# Patient Record
Sex: MALE | Race: WHITE | NOT HISPANIC OR LATINO | URBAN - METROPOLITAN AREA
[De-identification: names, ages, dates, MRNs, and addresses within clinical notes are randomized per-mention and may not be internally consistent; named-entity substitution may affect disease eponyms.]

---

## 2017-01-09 ENCOUNTER — OUTPATIENT (OUTPATIENT)
Dept: OUTPATIENT SERVICES | Facility: HOSPITAL | Age: 82
LOS: 1 days | End: 2017-01-09
Payer: MEDICARE

## 2017-01-09 VITALS
SYSTOLIC BLOOD PRESSURE: 130 MMHG | DIASTOLIC BLOOD PRESSURE: 82 MMHG | WEIGHT: 253.53 LBS | HEIGHT: 72 IN | RESPIRATION RATE: 16 BRPM | TEMPERATURE: 97 F | HEART RATE: 58 BPM

## 2017-01-09 DIAGNOSIS — Z01.818 ENCOUNTER FOR OTHER PREPROCEDURAL EXAMINATION: ICD-10-CM

## 2017-01-09 DIAGNOSIS — Z98.89 OTHER SPECIFIED POSTPROCEDURAL STATES: Chronic | ICD-10-CM

## 2017-01-09 DIAGNOSIS — Z95.1 PRESENCE OF AORTOCORONARY BYPASS GRAFT: Chronic | ICD-10-CM

## 2017-01-09 DIAGNOSIS — Z98.890 OTHER SPECIFIED POSTPROCEDURAL STATES: Chronic | ICD-10-CM

## 2017-01-09 DIAGNOSIS — K40.90 UNILATERAL INGUINAL HERNIA, WITHOUT OBSTRUCTION OR GANGRENE, NOT SPECIFIED AS RECURRENT: ICD-10-CM

## 2017-01-09 DIAGNOSIS — Z95.810 PRESENCE OF AUTOMATIC (IMPLANTABLE) CARDIAC DEFIBRILLATOR: Chronic | ICD-10-CM

## 2017-01-09 LAB
ALBUMIN SERPL ELPH-MCNC: 4.2 G/DL — SIGNIFICANT CHANGE UP (ref 3.3–5.2)
ALP SERPL-CCNC: 58 U/L — SIGNIFICANT CHANGE UP (ref 40–120)
ALT FLD-CCNC: 25 U/L — SIGNIFICANT CHANGE UP
ANION GAP SERPL CALC-SCNC: 11 MMOL/L — SIGNIFICANT CHANGE UP (ref 5–17)
APTT BLD: 38.7 SEC — HIGH (ref 27.5–37.4)
AST SERPL-CCNC: 21 U/L — SIGNIFICANT CHANGE UP
BILIRUB SERPL-MCNC: 0.8 MG/DL — SIGNIFICANT CHANGE UP (ref 0.4–2)
BLD GP AB SCN SERPL QL: SIGNIFICANT CHANGE UP
BUN SERPL-MCNC: 20 MG/DL — SIGNIFICANT CHANGE UP (ref 8–20)
CALCIUM SERPL-MCNC: 9.8 MG/DL — SIGNIFICANT CHANGE UP (ref 8.6–10.2)
CHLORIDE SERPL-SCNC: 102 MMOL/L — SIGNIFICANT CHANGE UP (ref 98–107)
CO2 SERPL-SCNC: 26 MMOL/L — SIGNIFICANT CHANGE UP (ref 22–29)
CREAT SERPL-MCNC: 0.8 MG/DL — SIGNIFICANT CHANGE UP (ref 0.5–1.3)
GLUCOSE SERPL-MCNC: 98 MG/DL — SIGNIFICANT CHANGE UP (ref 70–115)
HCT VFR BLD CALC: 49.4 % — SIGNIFICANT CHANGE UP (ref 42–52)
HGB BLD-MCNC: 16.6 G/DL — SIGNIFICANT CHANGE UP (ref 14–18)
INR BLD: 1.85 RATIO — HIGH (ref 0.88–1.16)
MCHC RBC-ENTMCNC: 33.5 PG — HIGH (ref 27–31)
MCHC RBC-ENTMCNC: 33.6 G/DL — SIGNIFICANT CHANGE UP (ref 32–36)
MCV RBC AUTO: 99.8 FL — HIGH (ref 80–94)
PLATELET # BLD AUTO: 178 K/UL — SIGNIFICANT CHANGE UP (ref 150–400)
POTASSIUM SERPL-MCNC: 4.9 MMOL/L — SIGNIFICANT CHANGE UP (ref 3.5–5.3)
POTASSIUM SERPL-SCNC: 4.9 MMOL/L — SIGNIFICANT CHANGE UP (ref 3.5–5.3)
PROT SERPL-MCNC: 7.7 G/DL — SIGNIFICANT CHANGE UP (ref 6.6–8.7)
PROTHROM AB SERPL-ACNC: 20.5 SEC — HIGH (ref 10–13.1)
RBC # BLD: 4.95 M/UL — SIGNIFICANT CHANGE UP (ref 4.6–6.2)
RBC # FLD: 14.2 % — SIGNIFICANT CHANGE UP (ref 11–15.6)
SODIUM SERPL-SCNC: 139 MMOL/L — SIGNIFICANT CHANGE UP (ref 135–145)
TYPE + AB SCN PNL BLD: SIGNIFICANT CHANGE UP
WBC # BLD: 7.58 K/UL — SIGNIFICANT CHANGE UP (ref 4.8–10.8)
WBC # FLD AUTO: 7.58 K/UL — SIGNIFICANT CHANGE UP (ref 4.8–10.8)

## 2017-01-09 PROCEDURE — 86900 BLOOD TYPING SEROLOGIC ABO: CPT

## 2017-01-09 PROCEDURE — G0463: CPT

## 2017-01-09 PROCEDURE — 85730 THROMBOPLASTIN TIME PARTIAL: CPT

## 2017-01-09 PROCEDURE — 85610 PROTHROMBIN TIME: CPT

## 2017-01-09 PROCEDURE — 86901 BLOOD TYPING SEROLOGIC RH(D): CPT

## 2017-01-09 PROCEDURE — 80053 COMPREHEN METABOLIC PANEL: CPT

## 2017-01-09 PROCEDURE — 85027 COMPLETE CBC AUTOMATED: CPT

## 2017-01-09 PROCEDURE — 86850 RBC ANTIBODY SCREEN: CPT

## 2017-01-09 NOTE — ASU PATIENT PROFILE, ADULT - PMH
Arthritis    BPH (benign prostatic hypertrophy)    CAD (coronary artery disease)    Hyperlipidemia    Hypertension    Hypothyroid    MI (myocardial infarction)    PAF (paroxysmal atrial fibrillation)

## 2017-01-09 NOTE — H&P PST ADULT - PSH
AICD (automatic cardioverter/defibrillator) present  with pacemaker 2015  H/O coronary angiogram  stented x4 2014  S/P CABG (coronary artery bypass graft)    S/P hernia repair    S/P tonsillectomy

## 2017-01-09 NOTE — H&P PST ADULT - NEGATIVE BREAST SYMPTOMS
no nipple discharge L/no breast tenderness R/no breast tenderness L/no breast lump L/no nipple discharge R/no breast lump R

## 2017-01-09 NOTE — H&P PST ADULT - NEGATIVE PSYCHIATRIC SYMPTOMS
no suicidal ideation/no agitation/no anxiety/no paranoia/no visual hallucinations/no hyperactivity/no memory loss/no auditory hallucinations/no depression/no mood swings/no insomnia

## 2017-01-09 NOTE — H&P PST ADULT - GASTROINTESTINAL DETAILS
no distention/bowel sounds normal/soft/no rigidity/no bruit/no rebound tenderness/no organomegaly/no guarding/no masses palpable

## 2017-01-09 NOTE — H&P PST ADULT - NEGATIVE GENERAL GENITOURINARY SYMPTOMS
no hematuria/no gas in urine/no flank pain L/no renal colic/no nocturia/no incontinence/no flank pain R/no dysuria/normal urinary frequency/normal libido/no bladder infections/no urinary hesitancy/no urine discoloration

## 2017-01-09 NOTE — H&P PST ADULT - NEGATIVE MALE-SPECIFIC SYMPTOMS
no scrotal mass L/no undescended testicle L/no ejaculatory dysfunction/no scrotal mass R/no undescended testicle R/no genital sores/no urethral discharge/no erectile dysfunction/no impotence

## 2017-01-09 NOTE — H&P PST ADULT - NSANTHOSAYNRD_GEN_A_CORE
No. NICK screening performed.  STOP BANG Legend: 0-2 = LOW Risk; 3-4 = INTERMEDIATE Risk; 5-8 = HIGH Risk

## 2017-01-09 NOTE — H&P PST ADULT - NEGATIVE OPHTHALMOLOGIC SYMPTOMS
no loss of vision R/no scleral injection R/no blurred vision L/no lacrimation R/no irritation R/no loss of vision L/no diplopia/no pain R/no photophobia/no pain L/no lacrimation L/no discharge L/no blurred vision R/no scleral injection L/no discharge R/no irritation L

## 2017-01-09 NOTE — H&P PST ADULT - NEGATIVE GASTROINTESTINAL SYMPTOMS
no nausea/no melena/no constipation/no flatulence/no diarrhea/no steatorrhea/no vomiting/no hematochezia/no jaundice/no abdominal pain/no hiccoughs/no change in bowel habits

## 2017-01-09 NOTE — H&P PST ADULT - NEGATIVE ENMT SYMPTOMS
no vertigo/no nose bleeds/no gum bleeding/[place] bilateral/no dry mouth/no sinus symptoms/no throat pain/no nasal congestion/no tinnitus/no nasal discharge/no post-nasal discharge/no abnormal taste sensation/no nasal obstruction/no recurrent cold sores/no ear pain/no dysphagia

## 2017-01-09 NOTE — H&P PST ADULT - NEGATIVE GENERAL SYMPTOMS
no weight gain/no polydipsia/no malaise/no polyphagia/no weight loss/no chills/no polyuria/no anorexia/no sweating

## 2017-01-09 NOTE — H&P PST ADULT - NEGATIVE MUSCULOSKELETAL SYMPTOMS
no myalgia/no neck pain/no arm pain L/no muscle weakness/no muscle cramps/no joint swelling/no arthralgia

## 2017-01-09 NOTE — H&P PST ADULT - NEGATIVE CARDIOVASCULAR SYMPTOMS
no dyspnea on exertion/no chest pain/no claudication/no palpitations/no peripheral edema/no orthopnea/no paroxysmal nocturnal dyspnea

## 2017-01-09 NOTE — H&P PST ADULT - MUSCULOSKELETAL
details… detailed exam ROM intact/no calf tenderness/normal strength/no joint erythema/no joint swelling/no joint warmth

## 2017-01-09 NOTE — H&P PST ADULT - NEUROLOGICAL DETAILS
cranial nerves intact/deep reflexes intact/responds to pain/no spontaneous movement/normal strength/superficial reflexes intact/alert and oriented x 3/sensation intact/responds to verbal commands

## 2017-01-09 NOTE — H&P PST ADULT - NEGATIVE NEUROLOGICAL SYMPTOMS
no difficulty walking/no weakness/no transient paralysis/no syncope/no confusion/no hemiparesis/no vertigo/no focal seizures/no headache/no paresthesias/no loss of consciousness/no facial palsy/no loss of sensation/no generalized seizures/no tremors

## 2017-01-09 NOTE — H&P PST ADULT - FAMILY HISTORY
Father  Still living? No  Family history of Hodgkin's lymphoma, Age at diagnosis: Age Unknown     Mother  Still living? No  Family history of colon cancer in mother, Age at diagnosis: Age Unknown  Family history of breast cancer in mother, Age at diagnosis: Age Unknown

## 2017-01-09 NOTE — H&P PST ADULT - HISTORY OF PRESENT ILLNESS
80 y/o male with c/o left inguinal discomfort which showed on examination left inguinal hernia patient now presents for laparoscopic possible open left inguinal hernia repair with MESH

## 2017-01-10 DIAGNOSIS — Z01.818 ENCOUNTER FOR OTHER PREPROCEDURAL EXAMINATION: ICD-10-CM

## 2017-01-10 DIAGNOSIS — I42.9 CARDIOMYOPATHY, UNSPECIFIED: ICD-10-CM

## 2017-01-12 ENCOUNTER — OUTPATIENT (OUTPATIENT)
Dept: OUTPATIENT SERVICES | Facility: HOSPITAL | Age: 82
LOS: 1 days | End: 2017-01-12
Payer: MEDICARE

## 2017-01-12 VITALS
RESPIRATION RATE: 16 BRPM | DIASTOLIC BLOOD PRESSURE: 64 MMHG | WEIGHT: 253.53 LBS | HEART RATE: 68 BPM | TEMPERATURE: 97 F | OXYGEN SATURATION: 97 % | SYSTOLIC BLOOD PRESSURE: 126 MMHG | HEIGHT: 72 IN

## 2017-01-12 VITALS
OXYGEN SATURATION: 95 % | RESPIRATION RATE: 15 BRPM | HEART RATE: 50 BPM | DIASTOLIC BLOOD PRESSURE: 49 MMHG | SYSTOLIC BLOOD PRESSURE: 101 MMHG

## 2017-01-12 DIAGNOSIS — Z95.1 PRESENCE OF AORTOCORONARY BYPASS GRAFT: Chronic | ICD-10-CM

## 2017-01-12 DIAGNOSIS — Z98.89 OTHER SPECIFIED POSTPROCEDURAL STATES: Chronic | ICD-10-CM

## 2017-01-12 DIAGNOSIS — K40.90 UNILATERAL INGUINAL HERNIA, WITHOUT OBSTRUCTION OR GANGRENE, NOT SPECIFIED AS RECURRENT: ICD-10-CM

## 2017-01-12 DIAGNOSIS — Z95.810 PRESENCE OF AUTOMATIC (IMPLANTABLE) CARDIAC DEFIBRILLATOR: Chronic | ICD-10-CM

## 2017-01-12 DIAGNOSIS — Z98.890 OTHER SPECIFIED POSTPROCEDURAL STATES: Chronic | ICD-10-CM

## 2017-01-12 LAB
APTT BLD: 33.5 SEC — SIGNIFICANT CHANGE UP (ref 27.5–37.4)
INR BLD: 1.2 RATIO — HIGH (ref 0.88–1.16)
PROTHROM AB SERPL-ACNC: 13.2 SEC — HIGH (ref 10–13.1)

## 2017-01-12 PROCEDURE — 36415 COLL VENOUS BLD VENIPUNCTURE: CPT

## 2017-01-12 PROCEDURE — 85730 THROMBOPLASTIN TIME PARTIAL: CPT

## 2017-01-12 PROCEDURE — 49651 LAP ING HERNIA REPAIR RECUR: CPT

## 2017-01-12 PROCEDURE — 49651 LAP ING HERNIA REPAIR RECUR: CPT | Mod: LT

## 2017-01-12 PROCEDURE — 49651 LAP ING HERNIA REPAIR RECUR: CPT | Mod: 80

## 2017-01-12 PROCEDURE — 88302 TISSUE EXAM BY PATHOLOGIST: CPT | Mod: 26

## 2017-01-12 PROCEDURE — 85610 PROTHROMBIN TIME: CPT

## 2017-01-12 PROCEDURE — C1781: CPT

## 2017-01-12 PROCEDURE — 88302 TISSUE EXAM BY PATHOLOGIST: CPT

## 2017-01-12 RX ORDER — OXYCODONE HYDROCHLORIDE 5 MG/1
1 TABLET ORAL
Qty: 20 | Refills: 0 | OUTPATIENT
Start: 2017-01-12

## 2017-01-12 RX ORDER — CEFAZOLIN SODIUM 1 G
2000 VIAL (EA) INJECTION ONCE
Qty: 0 | Refills: 0 | Status: COMPLETED | OUTPATIENT
Start: 2017-01-12 | End: 2017-01-12

## 2017-01-12 RX ORDER — FENTANYL CITRATE 50 UG/ML
50 INJECTION INTRAVENOUS
Qty: 0 | Refills: 0 | Status: DISCONTINUED | OUTPATIENT
Start: 2017-01-12 | End: 2017-01-12

## 2017-01-12 RX ORDER — ONDANSETRON 8 MG/1
4 TABLET, FILM COATED ORAL ONCE
Qty: 0 | Refills: 0 | Status: DISCONTINUED | OUTPATIENT
Start: 2017-01-12 | End: 2017-01-12

## 2017-01-12 RX ORDER — SODIUM CHLORIDE 9 MG/ML
1000 INJECTION, SOLUTION INTRAVENOUS
Qty: 0 | Refills: 0 | Status: DISCONTINUED | OUTPATIENT
Start: 2017-01-12 | End: 2017-01-12

## 2017-01-12 RX ADMIN — FENTANYL CITRATE 50 MICROGRAM(S): 50 INJECTION INTRAVENOUS at 19:51

## 2017-01-12 RX ADMIN — FENTANYL CITRATE 50 MICROGRAM(S): 50 INJECTION INTRAVENOUS at 19:50

## 2017-01-12 RX ADMIN — Medication 100 MILLIGRAM(S): at 16:15

## 2017-01-12 NOTE — ASU DISCHARGE PLAN (ADULT/PEDIATRIC). - MEDICATION SUMMARY - MEDICATIONS TO TAKE
I will START or STAY ON the medications listed below when I get home from the hospital:    finasteride 5 mg oral tablet  -- 1 tab(s) by mouth once a day  -- Indication: For as per PMD    acetaminophen-oxyCODONE 325 mg-5 mg oral tablet  -- 1 tab(s) by mouth every 6 hours, As Needed -for severe pain MDD:6 tabs  -- Caution federal law prohibits the transfer of this drug to any person other  than the person for whom it was prescribed.  May cause drowsiness.  Alcohol may intensify this effect.  Use care when operating dangerous machinery.  This prescription cannot be refilled.  This product contains acetaminophen.  Do not use  with any other product containing acetaminophen to prevent possible liver damage.  Using more of this medication than prescribed may cause serious breathing problems.    -- Indication: For Unilateral inguinal hernia without obstruction or gangrene    losartan 50 mg oral tablet  -- 1 tab(s) by mouth once a day  -- Indication: For as per PMD    digoxin 250 mcg (0.25 mg) oral tablet  -- 1 tab(s) by mouth once a day  -- Indication: For as per PMD    Coumadin 6 mg oral tablet  -- 1 tab(s) by mouth once a day  -- Indication: For as per PMD    lovastatin 20 mg oral tablet  -- 1 tab(s) by mouth once a day  -- Indication: For as per PMD    clopidogrel 75 mg oral tablet  -- 1 tab(s) by mouth once a day  -- Indication: For as per PMD    carvedilol 6.25 mg oral tablet  -- 1 tab(s) by mouth 2 times a day  -- Indication: For as per PMD    ferrous sulfate 325 mg oral delayed release tablet  -- 1 tab(s) by mouth once a day  -- Indication: For as per PMD    selenium 200 mcg oral tablet  --  by mouth   -- Indication: For as per PMD    midodrine 5 mg oral tablet  -- 1 tab(s) by mouth 2 times a day  -- Indication: For as per PMD    LEVOTHYROX 112 mcg (0.112 mg) oral tablet  -- 1 tab(s) by mouth once a day  -- Indication: For as per PMD    Multiple Vitamins oral tablet  -- 1 tab(s) by mouth once a day  -- Indication: For as per PMD    Vitamin D3 1000 intl units oral tablet  -- 1 tab(s) by mouth once a day  -- Indication: For as per PMD    vitamin E 400 intl units oral capsule  -- 1 cap(s) by mouth once a day  -- Indication: For as per PMD I will START or STAY ON the medications listed below when I get home from the hospital:    finasteride 5 mg oral tablet  -- 1 tab(s) by mouth once a day  -- Indication: For as per PMD    acetaminophen-oxyCODONE 325 mg-5 mg oral tablet  -- 1 tab(s) by mouth every 6 hours, As Neede -for severe pain MDD:6 tabs  -- Caution federal law prohibits the transfer of this drug to any person other  than the person for whom it was prescribed.  May cause drowsiness.  Alcohol may intensify this effect.  Use care when operating dangerous machinery.  This prescription cannot be refilled.  This product contains acetaminophen.  Do not use  with any other product containing acetaminophen to prevent possible liver damage.  Using more of this medication than prescribed may cause serious breathing problems.    -- Indication: For Unilateral inguinal hernia without obstruction or gangrene    losartan 50 mg oral tablet  -- 1 tab(s) by mouth once a day  -- Indication: For as per PMD    digoxin 250 mcg (0.25 mg) oral tablet  -- 1 tab(s) by mouth once a day  -- Indication: For as per PMD    Coumadin 6 mg oral tablet  -- 1 tab(s) by mouth once a day  -- Indication: For as per PMD    lovastatin 20 mg oral tablet  -- 1 tab(s) by mouth once a day  -- Indication: For as per PMD    clopidogrel 75 mg oral tablet  -- 1 tab(s) by mouth once a day  -- Indication: For as per PMD    carvedilol 6.25 mg oral tablet  -- 1 tab(s) by mouth 2 times a day  -- Indication: For as per PMD    ferrous sulfate 325 mg oral delayed release tablet  -- 1 tab(s) by mouth once a day  -- Indication: For as per PMD    selenium 200 mcg oral tablet  --  by mouth   -- Indication: For as per PMD    midodrine 5 mg oral tablet  -- 1 tab(s) by mouth 2 times a day  -- Indication: For as per PMD    LEVOTHYROX 112 mcg (0.112 mg) oral tablet  -- 1 tab(s) by mouth once a day  -- Indication: For as per PMD    Multiple Vitamins oral tablet  -- 1 tab(s) by mouth once a day  -- Indication: For as per PMD    Vitamin D3 1000 intl units oral tablet  -- 1 tab(s) by mouth once a day  -- Indication: For as per PMD    vitamin E 400 intl units oral capsule  -- 1 cap(s) by mouth once a day  -- Indication: For as per PMD

## 2017-01-12 NOTE — BRIEF OPERATIVE NOTE - PROCEDURE
Mesh removal  01/12/2017    Active  LLICATA  Laparoscopic inguinal herniorrhaphy  01/12/2017    Active  LLICATA

## 2017-01-12 NOTE — ASU DISCHARGE PLAN (ADULT/PEDIATRIC). - SPECIAL INSTRUCTIONS
Restart your coumadin and plavix tonight.  You WILL be bruised.  Apply ice pack to area to help with pain and swelling every hour for 15-20 min as needed.

## 2017-01-12 NOTE — ASU DISCHARGE PLAN (ADULT/PEDIATRIC). - INSTRUCTIONS
Do not get constipated: use over the counter stool softeners like dulcolax or sennakot to help with bowel movements until regular again

## 2017-01-17 LAB — SURGICAL PATHOLOGY FINAL REPORT - CH: SIGNIFICANT CHANGE UP

## 2017-01-18 ENCOUNTER — APPOINTMENT (OUTPATIENT)
Dept: SURGERY | Facility: CLINIC | Age: 82
End: 2017-01-18

## 2017-01-18 VITALS
HEIGHT: 76 IN | RESPIRATION RATE: 16 BRPM | OXYGEN SATURATION: 95 % | DIASTOLIC BLOOD PRESSURE: 57 MMHG | BODY MASS INDEX: 31.29 KG/M2 | TEMPERATURE: 97.7 F | WEIGHT: 257 LBS | HEART RATE: 73 BPM | SYSTOLIC BLOOD PRESSURE: 100 MMHG

## 2017-02-07 ENCOUNTER — OUTPATIENT (OUTPATIENT)
Dept: OUTPATIENT SERVICES | Facility: HOSPITAL | Age: 82
LOS: 1 days | End: 2017-02-07
Payer: MEDICARE

## 2017-02-07 ENCOUNTER — APPOINTMENT (OUTPATIENT)
Dept: UROLOGY | Facility: CLINIC | Age: 82
End: 2017-02-07

## 2017-02-07 VITALS
HEART RATE: 55 BPM | TEMPERATURE: 98 F | HEIGHT: 76 IN | WEIGHT: 250 LBS | RESPIRATION RATE: 18 BRPM | DIASTOLIC BLOOD PRESSURE: 66 MMHG | SYSTOLIC BLOOD PRESSURE: 120 MMHG

## 2017-02-07 VITALS
OXYGEN SATURATION: 96 % | SYSTOLIC BLOOD PRESSURE: 120 MMHG | TEMPERATURE: 98 F | HEART RATE: 55 BPM | RESPIRATION RATE: 18 BRPM | DIASTOLIC BLOOD PRESSURE: 66 MMHG

## 2017-02-07 DIAGNOSIS — Z95.1 PRESENCE OF AORTOCORONARY BYPASS GRAFT: Chronic | ICD-10-CM

## 2017-02-07 DIAGNOSIS — N28.0 ISCHEMIA AND INFARCTION OF KIDNEY: Chronic | ICD-10-CM

## 2017-02-07 DIAGNOSIS — I42.9 CARDIOMYOPATHY, UNSPECIFIED: ICD-10-CM

## 2017-02-07 DIAGNOSIS — Z98.89 OTHER SPECIFIED POSTPROCEDURAL STATES: Chronic | ICD-10-CM

## 2017-02-07 DIAGNOSIS — Z01.810 ENCOUNTER FOR PREPROCEDURAL CARDIOVASCULAR EXAMINATION: ICD-10-CM

## 2017-02-07 DIAGNOSIS — Z98.49 CATARACT EXTRACTION STATUS, UNSPECIFIED EYE: Chronic | ICD-10-CM

## 2017-02-07 DIAGNOSIS — Z98.890 OTHER SPECIFIED POSTPROCEDURAL STATES: Chronic | ICD-10-CM

## 2017-02-07 DIAGNOSIS — Z95.5 PRESENCE OF CORONARY ANGIOPLASTY IMPLANT AND GRAFT: Chronic | ICD-10-CM

## 2017-02-07 DIAGNOSIS — Z95.810 PRESENCE OF AUTOMATIC (IMPLANTABLE) CARDIAC DEFIBRILLATOR: Chronic | ICD-10-CM

## 2017-02-07 LAB
ANION GAP SERPL CALC-SCNC: 9 MMOL/L — SIGNIFICANT CHANGE UP (ref 5–17)
APTT BLD: 40.5 SEC — HIGH (ref 27.5–37.4)
BUN SERPL-MCNC: 18 MG/DL — SIGNIFICANT CHANGE UP (ref 8–20)
CALCIUM SERPL-MCNC: 9.4 MG/DL — SIGNIFICANT CHANGE UP (ref 8.6–10.2)
CHLORIDE SERPL-SCNC: 99 MMOL/L — SIGNIFICANT CHANGE UP (ref 98–107)
CO2 SERPL-SCNC: 29 MMOL/L — SIGNIFICANT CHANGE UP (ref 22–29)
CREAT SERPL-MCNC: 0.87 MG/DL — SIGNIFICANT CHANGE UP (ref 0.5–1.3)
GLUCOSE SERPL-MCNC: 115 MG/DL — SIGNIFICANT CHANGE UP (ref 70–115)
HCT VFR BLD CALC: 48 % — SIGNIFICANT CHANGE UP (ref 42–52)
HGB BLD-MCNC: 16 G/DL — SIGNIFICANT CHANGE UP (ref 14–18)
INR BLD: 1.88 RATIO — HIGH (ref 0.88–1.16)
MCHC RBC-ENTMCNC: 32.9 PG — HIGH (ref 27–31)
MCHC RBC-ENTMCNC: 33.3 G/DL — SIGNIFICANT CHANGE UP (ref 32–36)
MCV RBC AUTO: 98.6 FL — HIGH (ref 80–94)
PLATELET # BLD AUTO: 179 K/UL — SIGNIFICANT CHANGE UP (ref 150–400)
POTASSIUM SERPL-MCNC: 4.5 MMOL/L — SIGNIFICANT CHANGE UP (ref 3.5–5.3)
POTASSIUM SERPL-SCNC: 4.5 MMOL/L — SIGNIFICANT CHANGE UP (ref 3.5–5.3)
PROTHROM AB SERPL-ACNC: 20.8 SEC — HIGH (ref 10–13.1)
RBC # BLD: 4.87 M/UL — SIGNIFICANT CHANGE UP (ref 4.6–6.2)
RBC # FLD: 13.9 % — SIGNIFICANT CHANGE UP (ref 11–15.6)
SODIUM SERPL-SCNC: 137 MMOL/L — SIGNIFICANT CHANGE UP (ref 135–145)
WBC # BLD: 6.61 K/UL — SIGNIFICANT CHANGE UP (ref 4.8–10.8)
WBC # FLD AUTO: 6.61 K/UL — SIGNIFICANT CHANGE UP (ref 4.8–10.8)

## 2017-02-07 PROCEDURE — 93010 ELECTROCARDIOGRAM REPORT: CPT

## 2017-02-07 PROCEDURE — 76000 FLUOROSCOPY <1 HR PHYS/QHP: CPT | Mod: 26

## 2017-02-07 NOTE — H&P CARDIOLOGY - NEGATIVE CARDIOVASCULAR SYMPTOMS
no orthopnea/no chest pain/no claudication/no dyspnea on exertion/no paroxysmal nocturnal dyspnea/no palpitations

## 2017-02-07 NOTE — H&P CARDIOLOGY - PMH
Benign prostatic hyperplasia with urinary obstruction    Chronic atrial fibrillation    Coronary artery disease involving native coronary artery of native heart, angina presence unspecifie    Elevated prostate specific antigen (PSA)    Essential hypertension    HFrEF (heart failure with reduced ejection fraction)    History of ischemic cardiomyopathy    Hypothyroid    MI (myocardial infarction)    Mixed hyperlipidemia    Nonischemic cardiomyopathy    Osteoarthritis

## 2017-02-07 NOTE — H&P CARDIOLOGY - FAMILY HISTORY
Father  Still living? No  Family history of Hodgkin's lymphoma, Age at diagnosis: Age Unknown     Mother  Still living? No  Family history of colon cancer in mother, Age at diagnosis: Age Unknown  Family history of breast cancer in mother, Age at diagnosis: Age Unknown     Child  Still living? Unknown  Family history of malignant neoplasm of ovary, Age at diagnosis: Age Unknown

## 2017-02-07 NOTE — H&P CARDIOLOGY - RS GEN PE MLT RESP DETAILS PC
good air movement/breath sounds equal/respirations non-labored/airway patent/clear to auscultation bilaterally

## 2017-02-07 NOTE — H&P CARDIOLOGY - REVIEW OF SYSTEMS
General: + fatigue, no fevers/chills, no weight change  HEENT: No epistaxis, no tinnitus  GI: No nausea/vomiting, no black/bloody stools, no constipation/diarrhea  : No hematuria, no frequency  M/S: No myalgias, + arthralgias secondary to arthritis, no swollen joints, no back pain  Heme: Bruises easily secondary to warfarin and Plavix, no anemia, no coagulation disorders  Endo: No hot/cold intolerance, no polyuria, no polydypsia

## 2017-02-07 NOTE — H&P CARDIOLOGY - HISTORY OF PRESENT ILLNESS
80y/o male cigar smoker with history of CAD, S/P CABG (LIMA to the LAD, SVG to the Ramus, SVG to the RPDA), S/P PCI (Resolute ANGELINA's in the LM, D1, and pLCX), combined ischemic and nonischemic cardiomyopathy, single chamber [employer] ICD, HFrEF, MI, HTN, HLD, chronic AF (CHADS2-Vasc of 5 on warfarin), and BPH who c/o significant fatigue and BLE edema.  He is to have an upgrade to a biventricular ICD.    Upper Extremity Venogram (2/7/2017): Patent left subclavian and innominate system    Echo: 8/4/2016       LVSF: Severely decreased, severely hypokinetic inferior wall, basal and mid inferolateral wall, and apical lateral segment, mildly hypokinetic septum, basal and mid anterolateral wall, basal anterior segment, and apical anterior segment, moderately hypokinetic mid anterior segment.       EF: 25%       RVSF: Mildly enlarged       LA: Severely dilated       RA: severely dilated       Mitral Valve: Moderate MR       Aortic Valve: Mild AR       Tricuspid Valve: Moderate TR       Pulmonic Valve: Mild to moderate NC       Pericardium: No evidence of pericardial effusion    LHC: 6/22/2015       LM: 10% ISR       LAD: 10% proximal ISR, 95% mid stenosis, 10% D1 ISR       LCX: 10% proximal ISR       RCA: 100% proximal occlusion       LIMA to the LAD: Mild luminal irregularities with no flow limiting disturbances.       SVG to the Ramus: Mild luminal irregularities with no flow limiting disturbances.       SVG to the RPDA: Mild luminal irregularities with no flow limiting disturbances.    Nuclear Stress Test: 3/17/2016       Protocol: Paulino       Exercised for: 4 minutes, 35 seconds       Symptoms: SOB       Stress EKG: ND       DTS: -3.5       Nuclear Imaging: Small to moderate are of mild to moderate basal inferior, mid inferior, and basal inferolateral wall infarct, small apical infarct, no ischemia.    Cardiology: [facility]  PMD: Elian

## 2017-02-07 NOTE — H&P CARDIOLOGY - NEGATIVE NEUROLOGICAL SYMPTOMS
no loss of sensation/no loss of consciousness/no hemiparesis/no tremors/no syncope/no headache/no transient paralysis/no weakness/no difficulty walking/no generalized seizures/no vertigo/no focal seizures/no paresthesias

## 2017-02-07 NOTE — H&P CARDIOLOGY - PSH
AICD (automatic cardioverter/defibrillator) present  [employer] single chamber, 2015  History of cataract removal with insertion of prosthetic lens    History of coronary artery stent placement  Resolute ANGELINA's in the LM, D1, and pLCX  History of inguinal hernia repair    History of needle biopsy    S/P tonsillectomy    Status post three vessel coronary artery bypass  LIMA to the LAD, SVG to the Ramus, SVG to the RPDA  Torsion of renal pedicle

## 2017-02-07 NOTE — H&P CARDIOLOGY - COMMENTS
80y/o male cigar smoker with history of CAD, S/P CABG (LIMA to the LAD, SVG to the Ramus, SVG to the RPDA), S/P PCI (Resolute ANGELINA's in the LM, D1, and pLCX), combined ischemic and nonischemic cardiomyopathy, single chamber [employer] ICD, HFrEF, MI, HTN, HLD, chronic AF (CHADS2-Vasc of 5 on warfarin), and BPH who c/o significant fatigue and BLE edema.  He is to have an upgrade to a biventricular ICD.  1. BiV ICD upgrade  2. Will continue all medications.  3. Will repeat INR day of procedure.

## 2017-02-08 DIAGNOSIS — Z01.810 ENCOUNTER FOR PREPROCEDURAL CARDIOVASCULAR EXAMINATION: ICD-10-CM

## 2017-02-08 DIAGNOSIS — I25.10 ATHEROSCLEROTIC HEART DISEASE OF NATIVE CORONARY ARTERY WITHOUT ANGINA PECTORIS: ICD-10-CM

## 2017-02-10 ENCOUNTER — INPATIENT (INPATIENT)
Facility: HOSPITAL | Age: 82
LOS: 0 days | Discharge: HOME | DRG: 227 | End: 2017-02-11
Attending: STUDENT IN AN ORGANIZED HEALTH CARE EDUCATION/TRAINING PROGRAM | Admitting: STUDENT IN AN ORGANIZED HEALTH CARE EDUCATION/TRAINING PROGRAM
Payer: MEDICARE

## 2017-02-10 ENCOUNTER — TRANSCRIPTION ENCOUNTER (OUTPATIENT)
Age: 82
End: 2017-02-10

## 2017-02-10 VITALS
OXYGEN SATURATION: 98 % | TEMPERATURE: 98 F | HEART RATE: 56 BPM | SYSTOLIC BLOOD PRESSURE: 136 MMHG | RESPIRATION RATE: 18 BRPM | DIASTOLIC BLOOD PRESSURE: 71 MMHG

## 2017-02-10 DIAGNOSIS — Z98.890 OTHER SPECIFIED POSTPROCEDURAL STATES: Chronic | ICD-10-CM

## 2017-02-10 DIAGNOSIS — N28.0 ISCHEMIA AND INFARCTION OF KIDNEY: Chronic | ICD-10-CM

## 2017-02-10 DIAGNOSIS — Z01.810 ENCOUNTER FOR PREPROCEDURAL CARDIOVASCULAR EXAMINATION: ICD-10-CM

## 2017-02-10 DIAGNOSIS — I42.9 CARDIOMYOPATHY, UNSPECIFIED: ICD-10-CM

## 2017-02-10 DIAGNOSIS — Z95.5 PRESENCE OF CORONARY ANGIOPLASTY IMPLANT AND GRAFT: Chronic | ICD-10-CM

## 2017-02-10 DIAGNOSIS — Z95.810 PRESENCE OF AUTOMATIC (IMPLANTABLE) CARDIAC DEFIBRILLATOR: Chronic | ICD-10-CM

## 2017-02-10 DIAGNOSIS — Z95.1 PRESENCE OF AORTOCORONARY BYPASS GRAFT: Chronic | ICD-10-CM

## 2017-02-10 DIAGNOSIS — Z98.89 OTHER SPECIFIED POSTPROCEDURAL STATES: Chronic | ICD-10-CM

## 2017-02-10 DIAGNOSIS — Z98.49 CATARACT EXTRACTION STATUS, UNSPECIFIED EYE: Chronic | ICD-10-CM

## 2017-02-10 LAB
INR BLD: 2.16 RATIO — HIGH (ref 0.88–1.16)
PROTHROM AB SERPL-ACNC: 24 SEC — HIGH (ref 10–13.1)

## 2017-02-10 PROCEDURE — 33249 INSJ/RPLCMT DEFIB W/LEAD(S): CPT

## 2017-02-10 PROCEDURE — 93010 ELECTROCARDIOGRAM REPORT: CPT

## 2017-02-10 PROCEDURE — 33225 L VENTRIC PACING LEAD ADD-ON: CPT

## 2017-02-10 RX ORDER — LOSARTAN POTASSIUM 100 MG/1
50 TABLET, FILM COATED ORAL DAILY
Qty: 0 | Refills: 0 | Status: DISCONTINUED | OUTPATIENT
Start: 2017-02-10 | End: 2017-02-11

## 2017-02-10 RX ORDER — FINASTERIDE 5 MG/1
5 TABLET, FILM COATED ORAL DAILY
Qty: 0 | Refills: 0 | Status: DISCONTINUED | OUTPATIENT
Start: 2017-02-10 | End: 2017-02-11

## 2017-02-10 RX ORDER — DIGOXIN 250 MCG
0.25 TABLET ORAL DAILY
Qty: 0 | Refills: 0 | Status: DISCONTINUED | OUTPATIENT
Start: 2017-02-10 | End: 2017-02-11

## 2017-02-10 RX ORDER — MIDODRINE HYDROCHLORIDE 2.5 MG/1
5 TABLET ORAL
Qty: 0 | Refills: 0 | Status: DISCONTINUED | OUTPATIENT
Start: 2017-02-10 | End: 2017-02-11

## 2017-02-10 RX ORDER — CEPHALEXIN 500 MG
500 CAPSULE ORAL EVERY 12 HOURS
Qty: 0 | Refills: 0 | Status: DISCONTINUED | OUTPATIENT
Start: 2017-02-10 | End: 2017-02-11

## 2017-02-10 RX ORDER — MIDODRINE HYDROCHLORIDE 2.5 MG/1
1 TABLET ORAL
Qty: 0 | Refills: 0 | COMMUNITY

## 2017-02-10 RX ORDER — WARFARIN SODIUM 2.5 MG/1
6 TABLET ORAL ONCE
Qty: 0 | Refills: 0 | Status: COMPLETED | OUTPATIENT
Start: 2017-02-10 | End: 2017-02-10

## 2017-02-10 RX ORDER — FERROUS SULFATE 325(65) MG
325 TABLET ORAL DAILY
Qty: 0 | Refills: 0 | Status: DISCONTINUED | OUTPATIENT
Start: 2017-02-10 | End: 2017-02-11

## 2017-02-10 RX ORDER — CEFAZOLIN SODIUM 1 G
2000 VIAL (EA) INJECTION EVERY 8 HOURS
Qty: 0 | Refills: 0 | Status: DISCONTINUED | OUTPATIENT
Start: 2017-02-10 | End: 2017-02-11

## 2017-02-10 RX ORDER — ATORVASTATIN CALCIUM 80 MG/1
10 TABLET, FILM COATED ORAL AT BEDTIME
Qty: 0 | Refills: 0 | Status: DISCONTINUED | OUTPATIENT
Start: 2017-02-10 | End: 2017-02-11

## 2017-02-10 RX ORDER — WARFARIN SODIUM 2.5 MG/1
1 TABLET ORAL
Qty: 0 | Refills: 0 | COMMUNITY

## 2017-02-10 RX ORDER — ACETAMINOPHEN 500 MG
650 TABLET ORAL EVERY 6 HOURS
Qty: 0 | Refills: 0 | Status: DISCONTINUED | OUTPATIENT
Start: 2017-02-10 | End: 2017-02-11

## 2017-02-10 RX ORDER — CARVEDILOL PHOSPHATE 80 MG/1
6.25 CAPSULE, EXTENDED RELEASE ORAL EVERY 12 HOURS
Qty: 0 | Refills: 0 | Status: DISCONTINUED | OUTPATIENT
Start: 2017-02-10 | End: 2017-02-11

## 2017-02-10 RX ORDER — CLOPIDOGREL BISULFATE 75 MG/1
75 TABLET, FILM COATED ORAL DAILY
Qty: 0 | Refills: 0 | Status: DISCONTINUED | OUTPATIENT
Start: 2017-02-10 | End: 2017-02-11

## 2017-02-10 RX ORDER — LEVOTHYROXINE SODIUM 125 MCG
112 TABLET ORAL DAILY
Qty: 0 | Refills: 0 | Status: DISCONTINUED | OUTPATIENT
Start: 2017-02-10 | End: 2017-02-11

## 2017-02-10 RX ORDER — VITAMIN E 100 UNIT
400 CAPSULE ORAL DAILY
Qty: 0 | Refills: 0 | Status: DISCONTINUED | OUTPATIENT
Start: 2017-02-10 | End: 2017-02-11

## 2017-02-10 RX ORDER — ACETAMINOPHEN 500 MG
1000 TABLET ORAL ONCE
Qty: 0 | Refills: 0 | Status: DISCONTINUED | OUTPATIENT
Start: 2017-02-10 | End: 2017-02-11

## 2017-02-10 RX ORDER — CARVEDILOL PHOSPHATE 80 MG/1
1 CAPSULE, EXTENDED RELEASE ORAL
Qty: 0 | Refills: 0 | COMMUNITY

## 2017-02-10 RX ORDER — LOSARTAN POTASSIUM 100 MG/1
1 TABLET, FILM COATED ORAL
Qty: 0 | Refills: 0 | COMMUNITY

## 2017-02-10 RX ADMIN — ATORVASTATIN CALCIUM 10 MILLIGRAM(S): 80 TABLET, FILM COATED ORAL at 21:57

## 2017-02-10 RX ADMIN — MIDODRINE HYDROCHLORIDE 5 MILLIGRAM(S): 2.5 TABLET ORAL at 19:04

## 2017-02-10 RX ADMIN — CARVEDILOL PHOSPHATE 6.25 MILLIGRAM(S): 80 CAPSULE, EXTENDED RELEASE ORAL at 19:04

## 2017-02-10 RX ADMIN — Medication 100 MILLIGRAM(S): at 21:57

## 2017-02-10 RX ADMIN — WARFARIN SODIUM 6 MILLIGRAM(S): 2.5 TABLET ORAL at 21:57

## 2017-02-10 RX ADMIN — Medication 500 MILLIGRAM(S): at 19:04

## 2017-02-10 NOTE — DISCHARGE NOTE ADULT - MEDICATION SUMMARY - MEDICATIONS TO TAKE
I will START or STAY ON the medications listed below when I get home from the hospital:    losartan 50 mg oral tablet  -- 1 tab(s) by mouth once a day  -- Indication: For Hypertension    digoxin 250 mcg (0.25 mg) oral tablet  -- 1 tab(s) by mouth once a day  -- Indication: For AF    Coumadin 6 mg oral tablet  -- 1 tab(s) by mouth once a day  -- Indication: For AF    lovastatin 20 mg oral tablet  -- 1 tab(s) by mouth once a day  -- Indication: For Hyperlipidemia    clopidogrel 75 mg oral tablet  -- 1 tab(s) by mouth once a day  -- Indication: For Coronary artery disease    carvedilol 6.25 mg oral tablet  -- 1 tab(s) by mouth 2 times a day  -- Indication: For Hypertension    cephalexin 500 mg oral capsule  -- 1 cap(s) by mouth every 12 hours  -- Indication: For Prophylaxis    ferrous sulfate 325 mg oral delayed release tablet  -- 1 tab(s) by mouth once a day  -- Indication: For Supplement    selenium 200 mcg oral tablet  --  by mouth   -- Indication: For Supplement    midodrine 5 mg oral tablet  -- 1 tab(s) by mouth 2 times a day  -- Indication: For Hypotension    LEVOTHYROX 112 mcg (0.112 mg) oral tablet  -- 1 tab(s) by mouth once a day  -- Indication: For Hypothyroid    Multiple Vitamins oral tablet  -- 1 tab(s) by mouth once a day  -- Indication: For Supplement    Vitamin D3 1000 intl units oral tablet  -- 1 tab(s) by mouth once a day  -- Indication: For Supplement    vitamin E 400 intl units oral capsule  -- 1 cap(s) by mouth once a day  -- Indication: For Supplement

## 2017-02-10 NOTE — DISCHARGE NOTE ADULT - INSTRUCTIONS
Choose lean meats and poultry without skin and prepare them without added saturated and trans fat.  Eat fish at least twice a week. Recent research shows that eating oily fish containing omega-3 fatty acids (for example, salmon, trout and herring) may help lower your risk of death from coronary artery disease.  Select fat-free, 1 percent fat and low-fat dairy products.  Cut back on foods containing partially hydrogenated vegetable oils to reduce trans fat in your diet.   To lower cholesterol, reduce saturated fat to no more than 5 to 6 percent of total calories. For someone eating 2,000 calories a day, that’s about 13 grams of saturated fat.  Cut back on beverages and foods with added sugars.  Choose and prepare foods with little or no salt. To lower blood pressure, aim to eat no more than 2,400 milligrams of sodium per day. Reducing daily intake to 1,500 mg is desirable because it can lower blood pressure even further.  If you drink alcohol, drink in moderation. That means one drink per day if you’re a woman and two drinks  per day if you’re a man.  Follow the American Heart Association recommendations when you eat out, and keep an eye on your portion sizes. monitor L Chest wall site for signs of infection, or drainage

## 2017-02-10 NOTE — DISCHARGE NOTE ADULT - PROVIDER TOKENS
FREE:[LAST:[Mely],FIRST:[Parker],PHONE:[phone]],FAX:[phone]],ADDRESS:[[facility]  [address] [place] [address]]]

## 2017-02-10 NOTE — DISCHARGE NOTE ADULT - NS AS ACTIVITY OBS
Stairs allowed/No Heavy lifting/straining/Showering allowed/Walking-Indoors allowed/Walking-Outdoors allowed

## 2017-02-10 NOTE — DISCHARGE NOTE ADULT - HOSPITAL COURSE
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 On routine follow up, he was noted to have a progressively increasing RV pacing burden, most  recently reaching 92% RV pacing. He presented electively 2/10/17 for upgrade to a [employer] BiV ICD, which was performed without acute complication. 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 On routine follow up, he was noted to have a progressively increasing RV pacing burden, most  recently reaching 92% RV pacing. He presented electively 2/10/17 for upgrade to a [employer] BiV ICD, which was performed without acute complication.  His overnight stay was uneventful.  CXR shows good RV and LV lead placement with no pneumothorax.

## 2017-02-10 NOTE — DISCHARGE NOTE ADULT - CARE PROVIDER_API CALL
Parker Boone  [facility]  [address] [place] [address]  Phone: [phone]  Fax: [phone]

## 2017-02-10 NOTE — DISCHARGE NOTE ADULT - PLAN OF CARE
optimize cardiac health; minimize heart failure symptoms Pacemaker Implant Post Operative Instructions  - Bruising around the implant site or over the chest, side or arm near the incision is normal, and will take a few weeks to resolve.  -Do not lift the affected arm higher than 90 degrees (shoulder height) in any direction for 4 weeks.   - Do not push, pull or lift anything heavier than 10 lbs (about a gallon of milk) with the affected arm for 4 weeks.     - Do not touch the incision until it is completely healed.   - There are Steristrips (white strips of tape) on your incision, which will start to peal off on their own over the next 2-3 weeks. Do not pick at or peal off the Steristrips.   - Do not apply soaps, creams, lotions, ointments or powders to the incision until it is completely healed.  You should call the doctor if:   - you notice redness, drainage, swelling, increased tenderness, hot sensation around the  incision, bleeding or incision edges pulling apart.  - your temperature is greater than 100 degress F for more than 24 hours.  - you notice swelling or bulging at the incision or around the device that was not there when you left the hospital or is increasing in size.  -you experience increased difficulty breathing.  - you notice new/worsening swelling in your legs and ankles.  - you faint or have dizzy spells.  -you have any questions or concerns regarding your device or the procedure.

## 2017-02-10 NOTE — DISCHARGE NOTE ADULT - PATIENT PORTAL LINK FT
“You can access the [facility] Patient Portal, offered by [facility], by registering with the following website: [email or web address]”

## 2017-02-10 NOTE — DISCHARGE NOTE ADULT - NS MD DC FALL RISK RISK
For information on Fall & Injury Prevention, visit [email or web address]

## 2017-02-10 NOTE — DISCHARGE NOTE ADULT - CARE PLAN
Principal Discharge DX:	Chronic systolic heart failure  Goal:	optimize cardiac health; minimize heart failure symptoms  Instructions for follow-up, activity and diet:	Pacemaker Implant Post Operative Instructions  - Bruising around the implant site or over the chest, side or arm near the incision is normal, and will take a few weeks to resolve.  -Do not lift the affected arm higher than 90 degrees (shoulder height) in any direction for 4 weeks.   - Do not push, pull or lift anything heavier than 10 lbs (about a gallon of milk) with the affected arm for 4 weeks.     - Do not touch the incision until it is completely healed.   - There are Steristrips (white strips of tape) on your incision, which will start to peal off on their own over the next 2-3 weeks. Do not pick at or peal off the Steristrips.   - Do not apply soaps, creams, lotions, ointments or powders to the incision until it is completely healed.  You should call the doctor if:   - you notice redness, drainage, swelling, increased tenderness, hot sensation around the  incision, bleeding or incision edges pulling apart.  - your temperature is greater than 100 degress F for more than 24 hours.  - you notice swelling or bulging at the incision or around the device that was not there when you left the hospital or is increasing in size.  -you experience increased difficulty breathing.  - you notice new/worsening swelling in your legs and ankles.  - you faint or have dizzy spells.  -you have any questions or concerns regarding your device or the procedure.

## 2017-02-10 NOTE — PROGRESS NOTE ADULT - ASSESSMENT
81y Male   Procedure: S/P biventricular ICD upgrade  Pre-op diagnosis: HFrEF  Post-op diagnosis: HFrEF    1. Admit to [facility] over night with probable discharge in AM.    2. Keep left arm below shoulder with no heavy lifting for 6 weeks.  Keep site dry for 1 week.    3. Post op antibiotics with Ancef 2 grams every 8 hours for 2 more doses then Keflex 500mg TID for 4 days.    4. Continue other medications    5. Remove dressing after 48 hours    6. Follow up at [facility] in 2 weeks for wound check and ICD/pacemaker test.    7. EKG and 2 view CXR in AM

## 2017-02-10 NOTE — PROGRESS NOTE ADULT - SUBJECTIVE AND OBJECTIVE BOX
Subjective:  81y Male S/P Upgrade to a [employer] biventricular ICD.    HPI:82y/o male cigar smoker with history of CAD, S/P CABG (LIMA to the LAD, SVG to the Ramus, SVG to the RPDA), S/P PCI (Resolute ANGELINA's in the LM, D1, and pLCX), combined ischemic and nonischemic cardiomyopathy, single chamber [employer] ICD, HFrEF, MI, HTN, HLD, chronic AF (CHADS2-Vasc of 5 on warfarin), and BPH who c/o significant fatigue and BLE edema.  He is to have an upgrade to a biventricular ICD.    PAST MEDICAL & SURGICAL HISTORY:  Osteoarthritis  HFrEF (heart failure with reduced ejection fraction)  Nonischemic cardiomyopathy  History of ischemic cardiomyopathy  Elevated prostate specific antigen (PSA)  Benign prostatic hyperplasia with urinary obstruction  Chronic atrial fibrillation  Essential hypertension  Mixed hyperlipidemia  Coronary artery disease involving native coronary artery of native heart, angina presence unspecifie  Arthritis  BPH (benign prostatic hypertrophy)  Hypertension  Hyperlipidemia  MI (myocardial infarction)  Hypothyroid  CAD (coronary artery disease)  PAF (paroxysmal atrial fibrillation)  Torsion of renal pedicle  History of cataract removal with insertion of prosthetic lens  History of needle biopsy  History of inguinal hernia repair  History of coronary artery stent placement: Resolute ANGELINA&#x27;s in the LM, D1, and pLCX  Status post three vessel coronary artery bypass: LIMA to the LAD, SVG to the Ramus, SVG to the RPDA  AICD (automatic cardioverter/defibrillator) present: [employer]iele single chamber, 2015  H/O coronary angiogram: stented x4 2014  S/P tonsillectomy  S/P hernia repair  S/P CABG (coronary artery bypass graft)    ceFAZolin   IVPB 2000milliGRAM(s) IV Intermittent every 8 hours    No Known Allergies    General: No fatigue, no fevers/chills  Respiratory: No dyspnea, no cough, no wheeze  CV: No chest pain, no palpitations  Abd: No nausea  Neuro: No headache, no dizziness    Objective:  T(C): 36.9, Max: 36.9 (02-10 @ 11:27)  HR: 56 (56 - 56)  BP: 136/71 (136/71 - 136/71)  RR: 18 (18 - 18)  SpO2: 98% (98% - 98%)    CM: Ventricular paced  Gen: Awake, alert, note acute distress  Neuro: A&OX3  Chest: CTA, S1, S2, no murmur, RRR, left chest dressing clean, dry, intact, no edema  Abd: Soft  Extremity: No edema  EKG: Ventricular paced with underlying AF  CXR: Good RV and LV lead placement, no pneumothorax  Labs:          PT/INR - ( 10 Feb 2017 12:19 )   PT: 24.0 sec;   INR: 2.16 ratio

## 2017-02-11 VITALS — TEMPERATURE: 98 F | SYSTOLIC BLOOD PRESSURE: 124 MMHG | HEART RATE: 60 BPM | DIASTOLIC BLOOD PRESSURE: 58 MMHG

## 2017-02-11 LAB
INR BLD: 2.42 RATIO — HIGH (ref 0.88–1.16)
PROTHROM AB SERPL-ACNC: 26.9 SEC — HIGH (ref 10–13.1)

## 2017-02-11 PROCEDURE — 71010: CPT | Mod: 26,59

## 2017-02-11 PROCEDURE — 71020: CPT | Mod: 26

## 2017-02-11 PROCEDURE — 93010 ELECTROCARDIOGRAM REPORT: CPT

## 2017-02-11 RX ORDER — CEPHALEXIN 500 MG
1 CAPSULE ORAL
Qty: 8 | Refills: 0 | OUTPATIENT
Start: 2017-02-11 | End: 2017-02-15

## 2017-02-11 RX ADMIN — Medication 0.25 MILLIGRAM(S): at 06:08

## 2017-02-11 RX ADMIN — CARVEDILOL PHOSPHATE 6.25 MILLIGRAM(S): 80 CAPSULE, EXTENDED RELEASE ORAL at 06:07

## 2017-02-11 RX ADMIN — LOSARTAN POTASSIUM 50 MILLIGRAM(S): 100 TABLET, FILM COATED ORAL at 06:07

## 2017-02-11 RX ADMIN — Medication 100 MILLIGRAM(S): at 06:07

## 2017-02-11 RX ADMIN — MIDODRINE HYDROCHLORIDE 5 MILLIGRAM(S): 2.5 TABLET ORAL at 06:07

## 2017-02-11 RX ADMIN — Medication 112 MICROGRAM(S): at 06:08

## 2017-02-11 RX ADMIN — Medication 500 MILLIGRAM(S): at 06:07

## 2017-02-11 NOTE — PROGRESS NOTE ADULT - ASSESSMENT
81y Male   Procedure: Upgrade to a biventricular ICD  Discharge Diagnosis: HFrEF    1. Discharge today    2. Keep left arm below shoulder with no heavy lifting for 6 weeks.  Keep site dry for 1 week.    3. Post op antibiotics with Keflex 500mg every 8 hours for 4 more days.    4. Continue other medications    5. Remove dressing after 48 hours    6. Follow up at [facility] in 2 weeks for wound check and ICD/pacemaker test.

## 2017-02-11 NOTE — PROGRESS NOTE ADULT - SUBJECTIVE AND OBJECTIVE BOX
Subjective:  81y Male S/P  upgrade to BiV ICD     Patient is a 81y old  Male who presents with a chief complaint of elective upgrade to BiV ICD (10 Feb 2017 16:59)      PAST MEDICAL & SURGICAL HISTORY:  Osteoarthritis  HFrEF (heart failure with reduced ejection fraction)  Nonischemic cardiomyopathy  History of ischemic cardiomyopathy  Elevated prostate specific antigen (PSA)  Benign prostatic hyperplasia with urinary obstruction  Chronic atrial fibrillation  Essential hypertension  Mixed hyperlipidemia  Coronary artery disease involving native coronary artery of native heart, angina presence unspecifie  Arthritis  BPH (benign prostatic hypertrophy)  Hypertension  Hyperlipidemia  MI (myocardial infarction)  Hypothyroid  CAD (coronary artery disease)  PAF (paroxysmal atrial fibrillation)  Torsion of renal pedicle  History of cataract removal with insertion of prosthetic lens  History of needle biopsy  History of inguinal hernia repair  History of coronary artery stent placement: Resolute ANGELINA&#x27;s in the LM, D1, and pLCX  Status post three vessel coronary artery bypass: LIMA to the LAD, SVG to the Ramus, SVG to the RPDA  AICD (automatic cardioverter/defibrillator) present: [employer] single chamber, 2015  H/O coronary angiogram: stented x4 2014  S/P tonsillectomy  S/P hernia repair  S/P CABG (coronary artery bypass graft)    ceFAZolin   IVPB 2000milliGRAM(s) IV Intermittent every 8 hours  finasteride 5milliGRAM(s) Oral daily  losartan 50milliGRAM(s) Oral daily  digoxin     Tablet 0.25milliGRAM(s) Oral daily  atorvastatin 10milliGRAM(s) Oral at bedtime  clopidogrel Tablet 75milliGRAM(s) Oral daily  carvedilol 6.25milliGRAM(s) Oral every 12 hours  vitamin E 400International Unit(s) Oral daily  multivitamin 1Tablet(s) Oral daily  levothyroxine 112MICROGram(s) Oral daily  midodrine 5milliGRAM(s) Oral two times a day  ferrous    sulfate 325milliGRAM(s) Oral daily  cephalexin 500milliGRAM(s) Oral every 12 hours  acetaminophen   Tablet. 650milliGRAM(s) Oral every 6 hours PRN  acetaminophen  IVPB. 1000milliGRAM(s) IV Intermittent once PRN  oxyCODONE  5 mG/acetaminophen 325 mG 1Tablet(s) Oral every 6 hours PRN  oxyCODONE  5 mG/acetaminophen 325 mG 2Tablet(s) Oral every 6 hours PRN    No Known Allergies    General: No fatigue, no fevers/chills  Respiratory: No dyspnea, no cough, no wheeze  CV: No chest pain, no palpitations  Abd: No nausea  Neuro: No headache, no dizziness    Objective:  T(C): 36.5, Max: 37.1 (02-11 @ 06:05)  HR: 60 (60 - 72)  BP: 124/58 (112/55 - 140/61)  RR: 12 (12 - 18)  SpO2: 93% (93% - 96%)    CM: Ventricular paced  Gen: Awake, alert, note acute distress  Neuro: A&OX3  Chest: CTA, S1, S2, no murmur, RRR, left chest dressing clean, dry, intact, no edema  Abd: Soft  Extremity: No edema  EKG: Ventricular paced with PVCs  CXR: Good RV and LV lead palcement with no pneumothorax            PT/INR - ( 11 Feb 2017 06:03 )   PT: 26.9 sec;   INR: 2.42 ratio

## 2017-02-12 PROCEDURE — 33241 REMOVE PULSE GENERATOR: CPT

## 2017-02-12 PROCEDURE — C1887: CPT

## 2017-02-12 PROCEDURE — C1882: CPT

## 2017-02-12 PROCEDURE — C1769: CPT

## 2017-02-12 PROCEDURE — 85027 COMPLETE CBC AUTOMATED: CPT

## 2017-02-12 PROCEDURE — 33225 L VENTRIC PACING LEAD ADD-ON: CPT

## 2017-02-12 PROCEDURE — C1894: CPT

## 2017-02-12 PROCEDURE — 93005 ELECTROCARDIOGRAM TRACING: CPT

## 2017-02-12 PROCEDURE — 80048 BASIC METABOLIC PNL TOTAL CA: CPT

## 2017-02-12 PROCEDURE — C1900: CPT

## 2017-02-12 PROCEDURE — 85730 THROMBOPLASTIN TIME PARTIAL: CPT

## 2017-02-12 PROCEDURE — 85610 PROTHROMBIN TIME: CPT

## 2017-02-12 PROCEDURE — 33249 INSJ/RPLCMT DEFIB W/LEAD(S): CPT

## 2017-02-12 PROCEDURE — 71045 X-RAY EXAM CHEST 1 VIEW: CPT

## 2017-02-12 PROCEDURE — G0463: CPT

## 2017-02-12 PROCEDURE — C1730: CPT

## 2017-02-12 PROCEDURE — 36415 COLL VENOUS BLD VENIPUNCTURE: CPT

## 2017-02-12 PROCEDURE — 71046 X-RAY EXAM CHEST 2 VIEWS: CPT

## 2017-02-12 PROCEDURE — C1883: CPT

## 2017-02-23 ENCOUNTER — APPOINTMENT (OUTPATIENT)
Dept: ELECTROPHYSIOLOGY | Facility: CLINIC | Age: 82
End: 2017-02-23

## 2017-02-23 VITALS
HEIGHT: 76 IN | SYSTOLIC BLOOD PRESSURE: 85 MMHG | DIASTOLIC BLOOD PRESSURE: 55 MMHG | OXYGEN SATURATION: 95 % | BODY MASS INDEX: 30.44 KG/M2 | WEIGHT: 250 LBS | HEART RATE: 60 BPM

## 2017-02-27 ENCOUNTER — APPOINTMENT (OUTPATIENT)
Dept: RADIOLOGY | Facility: CLINIC | Age: 82
End: 2017-02-27

## 2017-02-27 ENCOUNTER — OUTPATIENT (OUTPATIENT)
Dept: OUTPATIENT SERVICES | Facility: HOSPITAL | Age: 82
LOS: 1 days | End: 2017-02-27
Payer: MEDICARE

## 2017-02-27 DIAGNOSIS — Z00.8 ENCOUNTER FOR OTHER GENERAL EXAMINATION: ICD-10-CM

## 2017-02-27 DIAGNOSIS — Z98.890 OTHER SPECIFIED POSTPROCEDURAL STATES: Chronic | ICD-10-CM

## 2017-02-27 DIAGNOSIS — Z95.1 PRESENCE OF AORTOCORONARY BYPASS GRAFT: Chronic | ICD-10-CM

## 2017-02-27 DIAGNOSIS — Z98.49 CATARACT EXTRACTION STATUS, UNSPECIFIED EYE: Chronic | ICD-10-CM

## 2017-02-27 DIAGNOSIS — Z98.89 OTHER SPECIFIED POSTPROCEDURAL STATES: Chronic | ICD-10-CM

## 2017-02-27 DIAGNOSIS — Z95.5 PRESENCE OF CORONARY ANGIOPLASTY IMPLANT AND GRAFT: Chronic | ICD-10-CM

## 2017-02-27 DIAGNOSIS — N28.0 ISCHEMIA AND INFARCTION OF KIDNEY: Chronic | ICD-10-CM

## 2017-02-27 DIAGNOSIS — Z95.810 PRESENCE OF AUTOMATIC (IMPLANTABLE) CARDIAC DEFIBRILLATOR: Chronic | ICD-10-CM

## 2017-02-27 PROCEDURE — 71046 X-RAY EXAM CHEST 2 VIEWS: CPT

## 2017-03-01 ENCOUNTER — APPOINTMENT (OUTPATIENT)
Dept: ELECTROPHYSIOLOGY | Facility: CLINIC | Age: 82
End: 2017-03-01

## 2017-06-21 ENCOUNTER — APPOINTMENT (OUTPATIENT)
Dept: ELECTROPHYSIOLOGY | Facility: CLINIC | Age: 82
End: 2017-06-21

## 2017-06-21 ENCOUNTER — OUTPATIENT (OUTPATIENT)
Dept: OUTPATIENT SERVICES | Facility: HOSPITAL | Age: 82
LOS: 1 days | End: 2017-06-21
Payer: MEDICARE

## 2017-06-21 DIAGNOSIS — Z98.890 OTHER SPECIFIED POSTPROCEDURAL STATES: Chronic | ICD-10-CM

## 2017-06-21 DIAGNOSIS — Z95.5 PRESENCE OF CORONARY ANGIOPLASTY IMPLANT AND GRAFT: Chronic | ICD-10-CM

## 2017-06-21 DIAGNOSIS — N28.0 ISCHEMIA AND INFARCTION OF KIDNEY: Chronic | ICD-10-CM

## 2017-06-21 DIAGNOSIS — Z95.810 PRESENCE OF AUTOMATIC (IMPLANTABLE) CARDIAC DEFIBRILLATOR: ICD-10-CM

## 2017-06-21 DIAGNOSIS — Z98.49 CATARACT EXTRACTION STATUS, UNSPECIFIED EYE: Chronic | ICD-10-CM

## 2017-06-21 DIAGNOSIS — Z95.1 PRESENCE OF AORTOCORONARY BYPASS GRAFT: Chronic | ICD-10-CM

## 2017-06-21 DIAGNOSIS — Z95.810 PRESENCE OF AUTOMATIC (IMPLANTABLE) CARDIAC DEFIBRILLATOR: Chronic | ICD-10-CM

## 2017-06-21 DIAGNOSIS — Z98.89 OTHER SPECIFIED POSTPROCEDURAL STATES: Chronic | ICD-10-CM

## 2017-06-21 PROCEDURE — 71046 X-RAY EXAM CHEST 2 VIEWS: CPT

## 2017-06-21 PROCEDURE — 71020: CPT | Mod: 26

## 2017-07-06 ENCOUNTER — APPOINTMENT (OUTPATIENT)
Dept: ELECTROPHYSIOLOGY | Facility: CLINIC | Age: 82
End: 2017-07-06

## 2017-07-06 VITALS
SYSTOLIC BLOOD PRESSURE: 104 MMHG | HEIGHT: 76 IN | BODY MASS INDEX: 29.96 KG/M2 | WEIGHT: 246 LBS | DIASTOLIC BLOOD PRESSURE: 70 MMHG | HEART RATE: 57 BPM | OXYGEN SATURATION: 94 %

## 2017-07-13 ENCOUNTER — APPOINTMENT (OUTPATIENT)
Dept: ELECTROPHYSIOLOGY | Facility: CLINIC | Age: 82
End: 2017-07-13

## 2017-10-12 ENCOUNTER — APPOINTMENT (OUTPATIENT)
Dept: ELECTROPHYSIOLOGY | Facility: CLINIC | Age: 82
End: 2017-10-12
Payer: MEDICARE

## 2017-10-12 ENCOUNTER — NON-APPOINTMENT (OUTPATIENT)
Age: 82
End: 2017-10-12

## 2017-10-12 VITALS
DIASTOLIC BLOOD PRESSURE: 72 MMHG | BODY MASS INDEX: 31.29 KG/M2 | WEIGHT: 257 LBS | OXYGEN SATURATION: 95 % | HEIGHT: 76 IN | HEART RATE: 75 BPM | SYSTOLIC BLOOD PRESSURE: 111 MMHG

## 2017-10-12 PROCEDURE — 99215 OFFICE O/P EST HI 40 MIN: CPT

## 2017-10-12 PROCEDURE — 93000 ELECTROCARDIOGRAM COMPLETE: CPT | Mod: 59

## 2017-10-12 PROCEDURE — 93284 PRGRMG EVAL IMPLANTABLE DFB: CPT

## 2018-01-09 ENCOUNTER — APPOINTMENT (OUTPATIENT)
Dept: CARDIOLOGY | Facility: CLINIC | Age: 83
End: 2018-01-09
Payer: MEDICARE

## 2018-01-09 PROCEDURE — 93284 PRGRMG EVAL IMPLANTABLE DFB: CPT

## 2018-01-12 ENCOUNTER — APPOINTMENT (OUTPATIENT)
Dept: CARDIOLOGY | Facility: CLINIC | Age: 83
End: 2018-01-12
Payer: MEDICARE

## 2018-01-12 PROCEDURE — 85610 PROTHROMBIN TIME: CPT | Mod: QW

## 2018-01-12 PROCEDURE — 93000 ELECTROCARDIOGRAM COMPLETE: CPT

## 2018-01-12 PROCEDURE — 99214 OFFICE O/P EST MOD 30 MIN: CPT

## 2018-01-17 ENCOUNTER — APPOINTMENT (OUTPATIENT)
Dept: CT IMAGING | Facility: CLINIC | Age: 83
End: 2018-01-17
Payer: MEDICARE

## 2018-01-17 ENCOUNTER — OUTPATIENT (OUTPATIENT)
Dept: OUTPATIENT SERVICES | Facility: HOSPITAL | Age: 83
LOS: 1 days | End: 2018-01-17
Payer: MEDICARE

## 2018-01-17 DIAGNOSIS — Z98.890 OTHER SPECIFIED POSTPROCEDURAL STATES: Chronic | ICD-10-CM

## 2018-01-17 DIAGNOSIS — Z98.49 CATARACT EXTRACTION STATUS, UNSPECIFIED EYE: Chronic | ICD-10-CM

## 2018-01-17 DIAGNOSIS — Z00.8 ENCOUNTER FOR OTHER GENERAL EXAMINATION: ICD-10-CM

## 2018-01-17 DIAGNOSIS — Z95.5 PRESENCE OF CORONARY ANGIOPLASTY IMPLANT AND GRAFT: Chronic | ICD-10-CM

## 2018-01-17 DIAGNOSIS — N28.0 ISCHEMIA AND INFARCTION OF KIDNEY: Chronic | ICD-10-CM

## 2018-01-17 DIAGNOSIS — Z95.1 PRESENCE OF AORTOCORONARY BYPASS GRAFT: Chronic | ICD-10-CM

## 2018-01-17 DIAGNOSIS — Z98.89 OTHER SPECIFIED POSTPROCEDURAL STATES: Chronic | ICD-10-CM

## 2018-01-17 DIAGNOSIS — Z95.810 PRESENCE OF AUTOMATIC (IMPLANTABLE) CARDIAC DEFIBRILLATOR: Chronic | ICD-10-CM

## 2018-01-17 PROCEDURE — 71275 CT ANGIOGRAPHY CHEST: CPT

## 2018-01-17 PROCEDURE — 82565 ASSAY OF CREATININE: CPT

## 2018-01-17 PROCEDURE — 71275 CT ANGIOGRAPHY CHEST: CPT | Mod: 26

## 2018-01-31 ENCOUNTER — RESULT REVIEW (OUTPATIENT)
Age: 83
End: 2018-01-31

## 2018-02-01 ENCOUNTER — RESULT REVIEW (OUTPATIENT)
Age: 83
End: 2018-02-01

## 2018-02-02 LAB — INR PPP: 2.4

## 2018-02-07 ENCOUNTER — RECORD ABSTRACTING (OUTPATIENT)
Age: 83
End: 2018-02-07

## 2018-02-07 DIAGNOSIS — Z86.79 PERSONAL HISTORY OF OTHER DISEASES OF THE CIRCULATORY SYSTEM: ICD-10-CM

## 2018-02-12 ENCOUNTER — RX RENEWAL (OUTPATIENT)
Age: 83
End: 2018-02-12

## 2018-02-16 ENCOUNTER — RESULT REVIEW (OUTPATIENT)
Age: 83
End: 2018-02-16

## 2018-02-16 LAB — INR PPP: 2.5

## 2018-03-05 LAB — INR PPP: 2.9

## 2018-03-06 ENCOUNTER — MEDICATION RENEWAL (OUTPATIENT)
Age: 83
End: 2018-03-06

## 2018-03-06 ENCOUNTER — RX RENEWAL (OUTPATIENT)
Age: 83
End: 2018-03-06

## 2018-03-14 RX ORDER — LOVASTATIN 20 MG/1
20 TABLET ORAL
Qty: 90 | Refills: 0 | Status: DISCONTINUED | COMMUNITY
Start: 2016-05-24 | End: 2018-03-14

## 2018-03-14 RX ORDER — MULTIVIT-MIN/FOLIC/VIT K/LYCOP 400-300MCG
50 MCG TABLET ORAL
Refills: 0 | Status: ACTIVE | COMMUNITY

## 2018-03-14 RX ORDER — FERROUS SULFATE 325(65) MG
325 TABLET ORAL
Refills: 0 | Status: ACTIVE | COMMUNITY

## 2018-03-14 RX ORDER — MAGNESIUM OXIDE 241.3 MG/1000MG
400 TABLET ORAL DAILY
Refills: 0 | Status: ACTIVE | COMMUNITY

## 2018-03-14 RX ORDER — WARFARIN 1 MG/1
1 TABLET ORAL
Qty: 90 | Refills: 0 | Status: DISCONTINUED | COMMUNITY
Start: 2016-10-13 | End: 2018-03-14

## 2018-03-15 ENCOUNTER — APPOINTMENT (OUTPATIENT)
Dept: CARDIOLOGY | Facility: CLINIC | Age: 83
End: 2018-03-15
Payer: MEDICARE

## 2018-03-15 ENCOUNTER — RESULT REVIEW (OUTPATIENT)
Age: 83
End: 2018-03-15

## 2018-03-15 VITALS
HEIGHT: 76 IN | BODY MASS INDEX: 30.78 KG/M2 | DIASTOLIC BLOOD PRESSURE: 64 MMHG | HEART RATE: 68 BPM | RESPIRATION RATE: 14 BRPM | SYSTOLIC BLOOD PRESSURE: 110 MMHG | WEIGHT: 252.8 LBS

## 2018-03-15 PROCEDURE — 99214 OFFICE O/P EST MOD 30 MIN: CPT

## 2018-03-15 PROCEDURE — 93000 ELECTROCARDIOGRAM COMPLETE: CPT

## 2018-03-15 RX ORDER — CYANOCOBALAMIN (VITAMIN B-12) 500 MCG
400 LOZENGE ORAL DAILY
Qty: 90 | Refills: 0 | Status: DISCONTINUED | COMMUNITY
Start: 2018-03-15 | End: 2018-03-15

## 2018-03-15 RX ORDER — OXYCODONE AND ACETAMINOPHEN 5; 325 MG/1; MG/1
5-325 TABLET ORAL
Qty: 20 | Refills: 0 | Status: DISCONTINUED | COMMUNITY
Start: 2017-01-12 | End: 2018-03-15

## 2018-03-30 LAB — INR PPP: 2.6

## 2018-04-03 ENCOUNTER — RESULT REVIEW (OUTPATIENT)
Age: 83
End: 2018-04-03

## 2018-04-10 ENCOUNTER — APPOINTMENT (OUTPATIENT)
Dept: CARDIOLOGY | Facility: CLINIC | Age: 83
End: 2018-04-10
Payer: MEDICARE

## 2018-04-10 PROCEDURE — 93284 PRGRMG EVAL IMPLANTABLE DFB: CPT

## 2018-04-12 ENCOUNTER — APPOINTMENT (OUTPATIENT)
Dept: ELECTROPHYSIOLOGY | Facility: CLINIC | Age: 83
End: 2018-04-12

## 2018-04-16 LAB — INR PPP: 2.5

## 2018-04-26 ENCOUNTER — APPOINTMENT (OUTPATIENT)
Dept: CARDIOLOGY | Facility: CLINIC | Age: 83
End: 2018-04-26
Payer: MEDICARE

## 2018-04-26 PROCEDURE — A9500: CPT

## 2018-04-26 PROCEDURE — 78452 HT MUSCLE IMAGE SPECT MULT: CPT

## 2018-04-26 PROCEDURE — 93015 CV STRESS TEST SUPVJ I&R: CPT

## 2018-04-26 RX ORDER — KIT FOR THE PREPARATION OF TECHNETIUM TC99M SESTAMIBI 1 MG/5ML
INJECTION, POWDER, LYOPHILIZED, FOR SOLUTION PARENTERAL
Refills: 0 | Status: COMPLETED | OUTPATIENT
Start: 2018-04-26

## 2018-04-26 RX ADMIN — KIT FOR THE PREPARATION OF TECHNETIUM TC99M SESTAMIBI 0: 1 INJECTION, POWDER, LYOPHILIZED, FOR SOLUTION PARENTERAL at 00:00

## 2018-04-26 RX ADMIN — REGADENOSON 5 MG/5ML: 0.08 INJECTION, SOLUTION INTRAVENOUS at 00:00

## 2018-04-27 LAB — INR PPP: 2.9

## 2018-04-27 RX ORDER — REGADENOSON 0.08 MG/ML
0.4 INJECTION, SOLUTION INTRAVENOUS
Qty: 4 | Refills: 0 | Status: COMPLETED | OUTPATIENT
Start: 2018-04-26

## 2018-05-03 ENCOUNTER — APPOINTMENT (OUTPATIENT)
Dept: CARDIOLOGY | Facility: CLINIC | Age: 83
End: 2018-05-03
Payer: MEDICARE

## 2018-05-03 PROCEDURE — ZZZZZ: CPT

## 2018-05-11 LAB — INR PPP: 2.8

## 2018-05-25 LAB — INR PPP: 2.4

## 2018-06-11 ENCOUNTER — MEDICATION RENEWAL (OUTPATIENT)
Age: 83
End: 2018-06-11

## 2018-06-13 LAB — INR PPP: 3.6

## 2018-06-15 ENCOUNTER — RESULT REVIEW (OUTPATIENT)
Age: 83
End: 2018-06-15

## 2018-06-15 LAB — INR PPP: 1.8

## 2018-06-25 ENCOUNTER — RX RENEWAL (OUTPATIENT)
Age: 83
End: 2018-06-25

## 2018-06-25 LAB — INR PPP: 2.8

## 2018-07-09 ENCOUNTER — APPOINTMENT (OUTPATIENT)
Dept: CARDIOLOGY | Facility: CLINIC | Age: 83
End: 2018-07-09
Payer: MEDICARE

## 2018-07-09 VITALS
WEIGHT: 250 LBS | RESPIRATION RATE: 16 BRPM | SYSTOLIC BLOOD PRESSURE: 110 MMHG | HEIGHT: 76 IN | DIASTOLIC BLOOD PRESSURE: 65 MMHG | BODY MASS INDEX: 30.44 KG/M2 | HEART RATE: 72 BPM

## 2018-07-09 PROCEDURE — 93000 ELECTROCARDIOGRAM COMPLETE: CPT

## 2018-07-09 PROCEDURE — 99214 OFFICE O/P EST MOD 30 MIN: CPT

## 2018-07-10 ENCOUNTER — APPOINTMENT (OUTPATIENT)
Dept: CARDIOLOGY | Facility: CLINIC | Age: 83
End: 2018-07-10
Payer: MEDICARE

## 2018-07-10 PROCEDURE — 93284 PRGRMG EVAL IMPLANTABLE DFB: CPT

## 2018-07-20 LAB
INR PPP: 3
INR PPP: 3.1
INR PPP: 3.1

## 2018-07-25 ENCOUNTER — MEDICATION RENEWAL (OUTPATIENT)
Age: 83
End: 2018-07-25

## 2018-08-01 LAB — INR PPP: 2.9

## 2018-08-07 ENCOUNTER — MEDICATION RENEWAL (OUTPATIENT)
Age: 83
End: 2018-08-07

## 2018-08-14 LAB — INR PPP: 3.9

## 2018-08-20 ENCOUNTER — MEDICATION RENEWAL (OUTPATIENT)
Age: 83
End: 2018-08-20

## 2018-08-20 LAB — INR PPP: 2.7

## 2018-08-22 ENCOUNTER — MEDICATION RENEWAL (OUTPATIENT)
Age: 83
End: 2018-08-22

## 2018-08-23 ENCOUNTER — RX RENEWAL (OUTPATIENT)
Age: 83
End: 2018-08-23

## 2018-08-30 LAB — INR PPP: 2.6

## 2018-09-10 ENCOUNTER — RX RENEWAL (OUTPATIENT)
Age: 83
End: 2018-09-10

## 2018-09-13 LAB — INR PPP: 2.9

## 2018-09-28 ENCOUNTER — MEDICATION RENEWAL (OUTPATIENT)
Age: 83
End: 2018-09-28

## 2018-09-28 LAB — INR PPP: 3.4

## 2018-10-10 ENCOUNTER — APPOINTMENT (OUTPATIENT)
Dept: CARDIOLOGY | Facility: CLINIC | Age: 83
End: 2018-10-10
Payer: MEDICARE

## 2018-10-10 PROCEDURE — 93284 PRGRMG EVAL IMPLANTABLE DFB: CPT

## 2018-10-11 PROBLEM — I42.8 OTHER CARDIOMYOPATHIES: Chronic | Status: ACTIVE | Noted: 2017-02-07

## 2018-10-11 PROBLEM — I50.20 UNSPECIFIED SYSTOLIC (CONGESTIVE) HEART FAILURE: Chronic | Status: ACTIVE | Noted: 2017-02-07

## 2018-10-11 PROBLEM — M19.90 UNSPECIFIED OSTEOARTHRITIS, UNSPECIFIED SITE: Chronic | Status: ACTIVE | Noted: 2017-02-07

## 2018-10-11 PROBLEM — I25.10 ATHEROSCLEROTIC HEART DISEASE OF NATIVE CORONARY ARTERY WITHOUT ANGINA PECTORIS: Chronic | Status: ACTIVE | Noted: 2017-02-07

## 2018-10-11 PROBLEM — I48.2 CHRONIC ATRIAL FIBRILLATION: Chronic | Status: ACTIVE | Noted: 2017-02-07

## 2018-10-11 PROBLEM — Z86.79 PERSONAL HISTORY OF OTHER DISEASES OF THE CIRCULATORY SYSTEM: Chronic | Status: ACTIVE | Noted: 2017-02-07

## 2018-10-11 PROBLEM — E78.2 MIXED HYPERLIPIDEMIA: Chronic | Status: ACTIVE | Noted: 2017-02-07

## 2018-10-11 PROBLEM — I10 ESSENTIAL (PRIMARY) HYPERTENSION: Chronic | Status: ACTIVE | Noted: 2017-02-07

## 2018-10-11 PROBLEM — N40.1 BENIGN PROSTATIC HYPERPLASIA WITH LOWER URINARY TRACT SYMPTOMS: Chronic | Status: ACTIVE | Noted: 2017-02-07

## 2018-10-11 PROBLEM — R97.20 ELEVATED PROSTATE SPECIFIC ANTIGEN [PSA]: Chronic | Status: ACTIVE | Noted: 2017-02-07

## 2018-10-11 LAB — INR PPP: 2.4

## 2018-10-25 LAB — INR PPP: 3.7

## 2018-10-30 ENCOUNTER — RX RENEWAL (OUTPATIENT)
Age: 83
End: 2018-10-30

## 2018-11-01 LAB — INR PPP: 2.5

## 2018-11-12 ENCOUNTER — APPOINTMENT (OUTPATIENT)
Dept: CARDIOLOGY | Facility: CLINIC | Age: 83
End: 2018-11-12
Payer: MEDICARE

## 2018-11-12 VITALS
DIASTOLIC BLOOD PRESSURE: 60 MMHG | HEIGHT: 76 IN | WEIGHT: 245 LBS | HEART RATE: 73 BPM | BODY MASS INDEX: 29.83 KG/M2 | RESPIRATION RATE: 18 BRPM | SYSTOLIC BLOOD PRESSURE: 120 MMHG

## 2018-11-12 PROCEDURE — 93000 ELECTROCARDIOGRAM COMPLETE: CPT

## 2018-11-12 PROCEDURE — 99214 OFFICE O/P EST MOD 30 MIN: CPT

## 2018-11-12 RX ORDER — AMOXICILLIN 500 MG/1
500 TABLET, FILM COATED ORAL
Qty: 4 | Refills: 0 | Status: DISCONTINUED | COMMUNITY
Start: 2017-02-17 | End: 2018-11-12

## 2018-11-12 NOTE — REASON FOR VISIT
[FreeTextEntry1] : Patient returns here for followup with regard to management of problems which include:\par \par 1. Mixed ischemic and nonischemic dilated cardiomyopathy\par \par 2. Chronic atrial fibrillation\par \par 3. 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 Valvular heart disease\par \par 5. Dila\par \par Had a pharmacologic stress test in april  that we are here to review.\par

## 2018-11-12 NOTE — HISTORY OF PRESENT ILLNESS
[FreeTextEntry1] : States that he has been feeling relatively well. Has some baseline fatigability and shortness of breath. Exercise less consistent. \par \par No symptoms related to his history of hypotension. \par No signs or symptoms of angina or heart failure at this time. \par No longer having diaphragmatic pacing from the LV lead.\par

## 2018-11-12 NOTE — REVIEW OF SYSTEMS
[Feeling Fatigued] : not feeling fatigued [Shortness Of Breath] : shortness of breath [Negative] : Heme/Lymph

## 2018-11-12 NOTE — ASSESSMENT
[FreeTextEntry1] : ECG: Likely underlying atrial fibrillation with frequent ventricular pacing at times in a bigeminal pattern.\par Laboratory data 10/29/18:\par Cholesterol 139\par HDL 38\par LDL 72 (down from 94 with increased atorvastatin from 20-40 mg a day).\par \par \par Laboratory data March 23, 2018\par Cholesterol 160\par HDL 38\par LDL 94\par Liver function tests normal\par \par Defibrillator report  10/10/18\par 6.7  years remaining\par 82% ventricularly paced.\par Sensitivity and impedances all good.\par LV lead has been turned off because of refractory diaphragmatic pacing\par \par Nuclear stress test April 26, 2018\par Moderate-sized moderate to severe fixed defect of the inferior wall inferoseptum and for apical areas consistent with infarction.\par Left ventricle dilated with severe global left ventricular dysfunction.\par No significant ischemia noted.\par Relatively unchanged in comparison to her prior study March 17, 2016.\par \par CT of the chest January 17, 2018.\par Sinus Valsalva 4 cm\par Ascending thoracic aorta 4 cm\par Descending thoracic aorta 3 cm\par \par Impression \par \par 1. In view of the above it seems that the descending thoracic aorta is not as large as the echo had suggested.\par \par 2. The patient has what appears to be a mixed ischemic and nonischemic cardiomyopathy with a prior inferior infarct and no evidence of ischemia.\par \par 3. He is not having any active heart failure or anginal symptoms at this time.\par \par 4. He is getting by with just right ventricular pacing because of the dysfunctional LV lead.\par \par 5. The LDL is better high and with increase his atorvastatin from 20 mg to 40 mg p.o. daily and requests future blood work again in 3 months. \par \par Patient knows to contact me with regard to any increasing shortness of breath, palpitations, chest pain or edema.

## 2018-11-12 NOTE — PHYSICAL EXAM
[General Appearance - Well Developed] : well developed [Normal Appearance] : normal appearance [Well Groomed] : well groomed [General Appearance - Well Nourished] : well nourished [No Deformities] : no deformities [General Appearance - In No Acute Distress] : no acute distress [Normal Conjunctiva] : the conjunctiva exhibited no abnormalities [Eyelids - No Xanthelasma] : the eyelids demonstrated no xanthelasmas [Normal Oral Mucosa] : normal oral mucosa [No Oral Pallor] : no oral pallor [No Oral Cyanosis] : no oral cyanosis [Normal Jugular Venous A Waves Present] : normal jugular venous A waves present [Normal Jugular Venous V Waves Present] : normal jugular venous V waves present [No Jugular Venous Fish A Waves] : no jugular venous fish A waves [Respiration, Rhythm And Depth] : normal respiratory rhythm and effort [Exaggerated Use Of Accessory Muscles For Inspiration] : no accessory muscle use [Auscultation Breath Sounds / Voice Sounds] : lungs were clear to auscultation bilaterally [FreeTextEntry1] : Irregular S1-S2 with a grade 1/6 systolic ejection murmur [Abdomen Soft] : soft [Abdomen Tenderness] : non-tender [Abdomen Mass (___ Cm)] : no abdominal mass palpated [Abnormal Walk] : normal gait [Gait - Sufficient For Exercise Testing] : the gait was sufficient for exercise testing [Nail Clubbing] : no clubbing of the fingernails [Cyanosis, Localized] : no localized cyanosis [Petechial Hemorrhages (___cm)] : no petechial hemorrhages [Skin Color & Pigmentation] : normal skin color and pigmentation [] : no rash [No Venous Stasis] : no venous stasis [Skin Lesions] : no skin lesions [No Skin Ulcers] : no skin ulcer [No Xanthoma] : no  xanthoma was observed [Oriented To Time, Place, And Person] : oriented to person, place, and time [Affect] : the affect was normal [Mood] : the mood was normal [No Anxiety] : not feeling anxious

## 2018-11-16 LAB — INR PPP: 3.1

## 2018-11-29 LAB — INR PPP: 4.1

## 2018-12-07 LAB — INR PPP: 2

## 2018-12-20 LAB — INR PPP: 3.5

## 2018-12-27 LAB — INR PPP: 2.9

## 2019-01-10 ENCOUNTER — APPOINTMENT (OUTPATIENT)
Dept: CARDIOLOGY | Facility: CLINIC | Age: 84
End: 2019-01-10
Payer: MEDICARE

## 2019-01-10 LAB — INR PPP: 2.5

## 2019-01-10 PROCEDURE — 93284 PRGRMG EVAL IMPLANTABLE DFB: CPT

## 2019-01-22 ENCOUNTER — RX RENEWAL (OUTPATIENT)
Age: 84
End: 2019-01-22

## 2019-01-30 LAB — INR PPP: 3.4

## 2019-01-31 ENCOUNTER — RESULT REVIEW (OUTPATIENT)
Age: 84
End: 2019-01-31

## 2019-02-06 ENCOUNTER — RX RENEWAL (OUTPATIENT)
Age: 84
End: 2019-02-06

## 2019-02-06 LAB — INR PPP: 2.6

## 2019-02-07 ENCOUNTER — RX RENEWAL (OUTPATIENT)
Age: 84
End: 2019-02-07

## 2019-02-07 NOTE — ASU DISCHARGE PLAN (ADULT/PEDIATRIC). - NOTIFY
Attempted Initial Spiritual Assessment in [id number]. Unable to assess patients needs. Pt was asleep. No family present at this time. Consulted with nurse Chaplains will follow as able and/or as needed. Clarita Kirk,  Intern, MDiv 
[phone] ([id number]) Unable to Urinate/Numbness, color, or temperature change to extremity/Excessive Diarrhea/Bleeding that does not stop/Persistent Nausea and Vomiting/Pain not relieved by Medications/Fever greater than 101

## 2019-02-14 LAB — INR PPP: 2.9

## 2019-02-19 ENCOUNTER — RX RENEWAL (OUTPATIENT)
Age: 84
End: 2019-02-19

## 2019-02-28 ENCOUNTER — RESULT REVIEW (OUTPATIENT)
Age: 84
End: 2019-02-28

## 2019-02-28 LAB — INR PPP: 3

## 2019-03-07 ENCOUNTER — APPOINTMENT (OUTPATIENT)
Dept: CARDIOLOGY | Facility: CLINIC | Age: 84
End: 2019-03-07
Payer: MEDICARE

## 2019-03-07 PROCEDURE — 93880 EXTRACRANIAL BILAT STUDY: CPT

## 2019-03-07 PROCEDURE — 93306 TTE W/DOPPLER COMPLETE: CPT

## 2019-03-14 ENCOUNTER — RESULT REVIEW (OUTPATIENT)
Age: 84
End: 2019-03-14

## 2019-03-18 ENCOUNTER — RX RENEWAL (OUTPATIENT)
Age: 84
End: 2019-03-18

## 2019-03-18 LAB
INR PPP: 1.2
PROTHROMBIN TIME COMMENT: NORMAL

## 2019-03-22 LAB — INR PPP: 2

## 2019-03-27 ENCOUNTER — RX RENEWAL (OUTPATIENT)
Age: 84
End: 2019-03-27

## 2019-03-29 ENCOUNTER — APPOINTMENT (OUTPATIENT)
Dept: CARDIOLOGY | Facility: CLINIC | Age: 84
End: 2019-03-29

## 2019-04-05 LAB — INR PPP: 2.4

## 2019-04-10 ENCOUNTER — NON-APPOINTMENT (OUTPATIENT)
Age: 84
End: 2019-04-10

## 2019-04-10 ENCOUNTER — APPOINTMENT (OUTPATIENT)
Dept: CARDIOLOGY | Facility: CLINIC | Age: 84
End: 2019-04-10
Payer: MEDICARE

## 2019-04-10 ENCOUNTER — RECORD ABSTRACTING (OUTPATIENT)
Age: 84
End: 2019-04-10

## 2019-04-10 VITALS
HEART RATE: 63 BPM | RESPIRATION RATE: 16 BRPM | HEIGHT: 76 IN | BODY MASS INDEX: 29.1 KG/M2 | DIASTOLIC BLOOD PRESSURE: 62 MMHG | SYSTOLIC BLOOD PRESSURE: 105 MMHG | WEIGHT: 239 LBS

## 2019-04-10 PROCEDURE — 99214 OFFICE O/P EST MOD 30 MIN: CPT

## 2019-04-10 PROCEDURE — 93000 ELECTROCARDIOGRAM COMPLETE: CPT

## 2019-04-11 ENCOUNTER — APPOINTMENT (OUTPATIENT)
Dept: CARDIOLOGY | Facility: CLINIC | Age: 84
End: 2019-04-11
Payer: MEDICARE

## 2019-04-11 PROCEDURE — 93289 INTERROG DEVICE EVAL HEART: CPT

## 2019-04-19 LAB — PT BLD: 2.2

## 2019-05-03 LAB — INR PPP: 2.7

## 2019-05-14 ENCOUNTER — RX RENEWAL (OUTPATIENT)
Age: 84
End: 2019-05-14

## 2019-05-16 ENCOUNTER — RESULT REVIEW (OUTPATIENT)
Age: 84
End: 2019-05-16

## 2019-05-16 LAB — INR PPP: 2.8

## 2019-05-30 ENCOUNTER — APPOINTMENT (OUTPATIENT)
Dept: CARDIOLOGY | Facility: CLINIC | Age: 84
End: 2019-05-30
Payer: MEDICARE

## 2019-05-30 ENCOUNTER — NON-APPOINTMENT (OUTPATIENT)
Age: 84
End: 2019-05-30

## 2019-05-30 VITALS
WEIGHT: 238 LBS | HEART RATE: 64 BPM | OXYGEN SATURATION: 97 % | HEIGHT: 76 IN | DIASTOLIC BLOOD PRESSURE: 75 MMHG | SYSTOLIC BLOOD PRESSURE: 105 MMHG | RESPIRATION RATE: 16 BRPM | BODY MASS INDEX: 28.98 KG/M2

## 2019-05-30 DIAGNOSIS — Z00.00 ENCOUNTER FOR GENERAL ADULT MEDICAL EXAMINATION W/OUT ABNORMAL FINDINGS: ICD-10-CM

## 2019-05-30 PROCEDURE — 93000 ELECTROCARDIOGRAM COMPLETE: CPT

## 2019-05-30 PROCEDURE — 99214 OFFICE O/P EST MOD 30 MIN: CPT

## 2019-05-30 NOTE — ASSESSMENT
[FreeTextEntry1] : ECG: Likely underlying atrial fibrillation with frequent ventricular pacing at 64 BPM.\par \par Laboratory data 2/19/19:\par Cholesterol 148\par HDL 38\par LDL 80\par A1c 6.3\par \par Echocardiogram 3/7/19:\par LV size and large with abnormal septal motion and severe hypokinesis involving the inferoposterior wall consistent with infarction. Global moderate reduction in left ventricular systolic function. Left ventricular ejection fraction 30-35%.\par Right ventricle marginal with mild RV dysfunction\par Severe biatrial enlargement.\par Moderate mitral insufficiency.\par Mild dilatation of the ascending aorta.\par Comparison with a prior echocardiogram shows a slight reduction in left ventricular size and a slight improvement in left ventricular systolic function.\par  \par Defibrillator report   4/11/9\par 6.2  years remaining\par 86% ventricularly paced.\par Sensitivity and impedances all good.\par LV lead has been turned off because of refractory diaphragmatic pacing\par 3 NSVT longest 24 beats No Rx required\par \par Nuclear stress test April 26, 2018\par Moderate-sized moderate to severe fixed defect of the inferior wall inferoseptum and for apical areas consistent with infarction.\par Left ventricle dilated with severe global left ventricular dysfunction.\par No significant ischemia noted.\par Relatively unchanged in comparison to her prior study March 17, 2016.\par \par CT of the chest January 17, 2018.\par Sinus Valsalva 4 cm\par Ascending thoracic aorta 4 cm\par Descending thoracic aorta 3 cm\par \par Impression \par \par 1. In view of the above it seems that the descending thoracic aorta is not as large as the echo had suggested.\par \par 2. 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 Mild improvement in the size and systolic function noted on the echo\par \par 3. He is not having any active heart failure or anginal symptoms at this time.\par \par 4. He is getting by with just right ventricular pacing because of the dysfunctional LV lead.\par \par 5. The LDL is better with increase his atorvastatin from 20 mg to 40 mg p.o. daily \par \par Patient knows to contact me with regard to any increasing shortness of breath, palpitations, chest pain or edema.

## 2019-05-30 NOTE — REASON FOR VISIT
[FreeTextEntry1] : Patient returns here for followup with regard to management of problems which include:\par \par 1. Mixed ischemic and nonischemic dilated cardiomyopathy\par \par 2. Chronic atrial fibrillation\par \par 3. 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 Valvular heart disease\par \par 5. Unspecified hypotension\par \par

## 2019-06-03 ENCOUNTER — RESULT REVIEW (OUTPATIENT)
Age: 84
End: 2019-06-03

## 2019-06-03 LAB — INR PPP: 2.7

## 2019-06-13 ENCOUNTER — RESULT REVIEW (OUTPATIENT)
Age: 84
End: 2019-06-13

## 2019-06-13 LAB — INR PPP: 2.5

## 2019-06-27 ENCOUNTER — RESULT REVIEW (OUTPATIENT)
Age: 84
End: 2019-06-27

## 2019-06-27 LAB — INR PPP: 2.2

## 2019-07-10 ENCOUNTER — APPOINTMENT (OUTPATIENT)
Dept: CARDIOLOGY | Facility: CLINIC | Age: 84
End: 2019-07-10
Payer: MEDICARE

## 2019-07-10 PROCEDURE — 93289 INTERROG DEVICE EVAL HEART: CPT

## 2019-07-15 LAB — INR PPP: 2.5

## 2019-07-25 ENCOUNTER — RX RENEWAL (OUTPATIENT)
Age: 84
End: 2019-07-25

## 2019-07-25 LAB — INR PPP: 2.6

## 2019-08-09 ENCOUNTER — RESULT REVIEW (OUTPATIENT)
Age: 84
End: 2019-08-09

## 2019-08-09 LAB — INR PPP: 2.6

## 2019-08-22 ENCOUNTER — RESULT REVIEW (OUTPATIENT)
Age: 84
End: 2019-08-22

## 2019-08-26 LAB — INR PPP: 3

## 2019-09-05 LAB — INR PPP: 2.7

## 2019-09-19 ENCOUNTER — RESULT REVIEW (OUTPATIENT)
Age: 84
End: 2019-09-19

## 2019-09-20 LAB — INR PPP: 2.5

## 2019-10-03 ENCOUNTER — RESULT REVIEW (OUTPATIENT)
Age: 84
End: 2019-10-03

## 2019-10-03 LAB — INR PPP: 2.8

## 2019-10-04 ENCOUNTER — RX CHANGE (OUTPATIENT)
Age: 84
End: 2019-10-04

## 2019-10-04 ENCOUNTER — MEDICATION RENEWAL (OUTPATIENT)
Age: 84
End: 2019-10-04

## 2019-10-15 ENCOUNTER — APPOINTMENT (OUTPATIENT)
Dept: CARDIOLOGY | Facility: CLINIC | Age: 84
End: 2019-10-15
Payer: MEDICARE

## 2019-10-15 PROCEDURE — 93289 INTERROG DEVICE EVAL HEART: CPT

## 2019-10-17 ENCOUNTER — RECORD ABSTRACTING (OUTPATIENT)
Age: 84
End: 2019-10-17

## 2019-10-17 LAB — INR PPP: 1.9

## 2019-10-18 ENCOUNTER — RESULT REVIEW (OUTPATIENT)
Age: 84
End: 2019-10-18

## 2019-10-23 ENCOUNTER — RX RENEWAL (OUTPATIENT)
Age: 84
End: 2019-10-23

## 2019-10-31 ENCOUNTER — RESULT REVIEW (OUTPATIENT)
Age: 84
End: 2019-10-31

## 2019-11-01 ENCOUNTER — RECORD ABSTRACTING (OUTPATIENT)
Age: 84
End: 2019-11-01

## 2019-11-01 LAB — INR PPP: 2.9

## 2019-11-11 ENCOUNTER — NON-APPOINTMENT (OUTPATIENT)
Age: 84
End: 2019-11-11

## 2019-11-11 ENCOUNTER — APPOINTMENT (OUTPATIENT)
Dept: CARDIOLOGY | Facility: CLINIC | Age: 84
End: 2019-11-11
Payer: MEDICARE

## 2019-11-11 VITALS
BODY MASS INDEX: 28.25 KG/M2 | HEIGHT: 76 IN | HEART RATE: 72 BPM | DIASTOLIC BLOOD PRESSURE: 60 MMHG | OXYGEN SATURATION: 96 % | SYSTOLIC BLOOD PRESSURE: 98 MMHG | WEIGHT: 232 LBS | RESPIRATION RATE: 16 BRPM

## 2019-11-11 PROCEDURE — 93000 ELECTROCARDIOGRAM COMPLETE: CPT

## 2019-11-11 PROCEDURE — 99214 OFFICE O/P EST MOD 30 MIN: CPT

## 2019-11-11 NOTE — ASSESSMENT
[FreeTextEntry1] : ECG: Likely underlying atrial fibrillation with frequent ventricular pacing at 63 BPM.\par \par Laboratory data 11/01/19\par Chol. 134\par LDL    73\par HDL    37\par Tri.   165\par A1 6.3\par LFTS WNL\par \par Echocardiogram 3/7/19:\par LV size enlarged with abnormal septal motion and severe hypokinesis involving the inferoposterior wall consistent with infarction. Global moderate reduction in left ventricular systolic function. Left ventricular ejection fraction 30-35%.\par Right ventricle marginal with mild RV dysfunction\par Severe biatrial enlargement.\par Moderate mitral insufficiency.\par Mild dilatation of the ascending aorta.\par Comparison with a prior echocardiogram shows a slight reduction in left ventricular size and a slight improvement in left ventricular systolic function.\par  \par \par Nuclear stress test April 26, 2018\par Moderate-sized moderate to severe fixed defect of the inferior wall inferoseptum and for apical areas consistent with infarction.\par Left ventricle dilated with severe global left ventricular dysfunction.\par No significant ischemia noted.\par Relatively unchanged in comparison to her prior study March 17, 2016.\par \par CT of the chest January 17, 2018.\par Sinus Valsalva 4 cm\par Ascending thoracic aorta 4 cm\par Descending thoracic aorta 3 cm\par \par Impression \par \par 1. The descending thoracic aorta on CT is not as large as the echo had suggested.\par \par 2. 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 Mild improvement in the size and systolic function noted on the echo\par \par 3. He is not having any active heart failure or anginal symptoms at this time.\par \par 4. PPM interrogation shows continued V pacing and 1 episodes of NSVT of which he was asymptomatic.\par \par 5. Improvement noted in lipid panel with increasing Atorvastatin to 40 mg from 20 mg. \par \par 6. Blood pressure today 98/60 and is asymptomatic. ECG is at his baseline and unchanged.\par \par 7. No anginal symptoms.\par \par \par Plan\par 1. Follow up with Daniel/device tech as scheduled.\par 2. Continue all medications as prescribed\par 3. repeat blood work in 6 months\par 4. Call with  blood pressure average after changing Losartan to Valsartan\par \par Patient knows to contact me with regard to any increasing shortness of breath, palpitations, chest pain or edema.\par \par Clinical follow up in 6 months\par Likely Nuclear stress at that time

## 2019-11-11 NOTE — REVIEW OF SYSTEMS
[Shortness Of Breath] : shortness of breath [Negative] : Heme/Lymph [Feeling Fatigued] : not feeling fatigued [Recent Weight Loss (___ Lbs)] : recent [unfilled] ~Ulb weight loss

## 2019-11-11 NOTE — PHYSICAL EXAM
[General Appearance - Well Developed] : well developed [Normal Appearance] : normal appearance [Well Groomed] : well groomed [General Appearance - Well Nourished] : well nourished [No Deformities] : no deformities [General Appearance - In No Acute Distress] : no acute distress [Normal Conjunctiva] : the conjunctiva exhibited no abnormalities [Eyelids - No Xanthelasma] : the eyelids demonstrated no xanthelasmas [Normal Oral Mucosa] : normal oral mucosa [No Oral Pallor] : no oral pallor [No Oral Cyanosis] : no oral cyanosis [Normal Jugular Venous V Waves Present] : normal jugular venous V waves present [Normal Jugular Venous A Waves Present] : normal jugular venous A waves present [No Jugular Venous Fish A Waves] : no jugular venous fish A waves [Respiration, Rhythm And Depth] : normal respiratory rhythm and effort [Exaggerated Use Of Accessory Muscles For Inspiration] : no accessory muscle use [Auscultation Breath Sounds / Voice Sounds] : lungs were clear to auscultation bilaterally [Abdomen Soft] : soft [Abdomen Tenderness] : non-tender [Abdomen Mass (___ Cm)] : no abdominal mass palpated [Gait - Sufficient For Exercise Testing] : the gait was sufficient for exercise testing [Abnormal Walk] : normal gait [Petechial Hemorrhages (___cm)] : no petechial hemorrhages [Nail Clubbing] : no clubbing of the fingernails [Cyanosis, Localized] : no localized cyanosis [Skin Color & Pigmentation] : normal skin color and pigmentation [] : no rash [Skin Lesions] : no skin lesions [No Venous Stasis] : no venous stasis [No Skin Ulcers] : no skin ulcer [No Xanthoma] : no  xanthoma was observed [Oriented To Time, Place, And Person] : oriented to person, place, and time [Mood] : the mood was normal [Affect] : the affect was normal [No Anxiety] : not feeling anxious [FreeTextEntry1] : Irregular S1-S2 with a grade 1/6 systolic ejection murmur

## 2019-11-11 NOTE — HISTORY OF PRESENT ILLNESS
[FreeTextEntry1] : States that he has been feeling relatively well. Has some baseline fatigability which has not worsened.\par \par No symptoms related to his history of hypotension.\par \par Exercise consist of walking 2-3 days a week, admits to not fully exerting himself.\par \par Losartan 50 mg changed to Valsartan 80 mg in October due to recall. His blood pressure log is not available for review, otherwise tolerating medication.\par \par No signs or symptoms of angina or heart failure at this time. \par \par ICD  interrogation 10/15/19\par   85%\par Approx. 5.8 mg of battery life.\par 1 NSVT recorded on 7/24/19 for 10 beats, he was asymptomatic. \par \par \par \par

## 2019-11-14 ENCOUNTER — RESULT REVIEW (OUTPATIENT)
Age: 84
End: 2019-11-14

## 2019-11-14 LAB — INR PPP: 2.7

## 2019-12-02 ENCOUNTER — RESULT REVIEW (OUTPATIENT)
Age: 84
End: 2019-12-02

## 2019-12-02 LAB — INR PPP: 3.1

## 2019-12-11 ENCOUNTER — MEDICATION RENEWAL (OUTPATIENT)
Age: 84
End: 2019-12-11

## 2019-12-11 ENCOUNTER — RX RENEWAL (OUTPATIENT)
Age: 84
End: 2019-12-11

## 2019-12-17 LAB — INR PPP: 3.1

## 2019-12-26 LAB — INR PPP: 2.8

## 2020-01-02 ENCOUNTER — RX RENEWAL (OUTPATIENT)
Age: 85
End: 2020-01-02

## 2020-01-02 ENCOUNTER — MEDICATION RENEWAL (OUTPATIENT)
Age: 85
End: 2020-01-02

## 2020-01-09 ENCOUNTER — RESULT REVIEW (OUTPATIENT)
Age: 85
End: 2020-01-09

## 2020-01-09 LAB — INR PPP: 1.9

## 2020-01-15 ENCOUNTER — APPOINTMENT (OUTPATIENT)
Dept: CARDIOLOGY | Facility: CLINIC | Age: 85
End: 2020-01-15
Payer: MEDICARE

## 2020-01-15 PROCEDURE — 93289 INTERROG DEVICE EVAL HEART: CPT

## 2020-01-24 LAB — INR PPP: 2.5

## 2020-02-07 LAB — INR PPP: 2.9

## 2020-02-20 LAB — INR PPP: 2.2

## 2020-03-09 LAB — INR PPP: 2.9

## 2020-03-23 LAB — INR PPP: 2

## 2020-04-02 LAB — INR PPP: 2.7

## 2020-04-17 LAB — INR PPP: 3.2

## 2020-04-30 LAB — INR PPP: 2.9

## 2020-05-14 LAB — INR PPP: 3.2

## 2020-05-18 ENCOUNTER — RX RENEWAL (OUTPATIENT)
Age: 85
End: 2020-05-18

## 2020-05-29 LAB — INR PPP: 3.1

## 2020-06-09 ENCOUNTER — APPOINTMENT (OUTPATIENT)
Dept: CARDIOLOGY | Facility: CLINIC | Age: 85
End: 2020-06-09
Payer: MEDICARE

## 2020-06-09 VITALS
SYSTOLIC BLOOD PRESSURE: 115 MMHG | WEIGHT: 230 LBS | HEIGHT: 76 IN | OXYGEN SATURATION: 98 % | HEART RATE: 60 BPM | BODY MASS INDEX: 28.01 KG/M2 | RESPIRATION RATE: 16 BRPM | DIASTOLIC BLOOD PRESSURE: 65 MMHG

## 2020-06-09 PROCEDURE — 93289 INTERROG DEVICE EVAL HEART: CPT

## 2020-06-09 PROCEDURE — 99214 OFFICE O/P EST MOD 30 MIN: CPT

## 2020-06-09 PROCEDURE — 93000 ELECTROCARDIOGRAM COMPLETE: CPT | Mod: 59

## 2020-06-09 NOTE — ASSESSMENT
[FreeTextEntry1] : ECG: Likely underlying atrial fibrillation with frequent ventricular pacing at 63 BPM.\par \par Lab Data 20\par Chol: 144\par HDL: 36\par LDL: 83\par Tr\par Creat: 0.82\par \par Laboratory data 19\par Chol. 134\par LDL    73\par HDL    37\par Tri.   165\par A1 6.3\par LFTS WNL\par \par Echocardiogram 3/7/19:\par LV size enlarged with abnormal septal motion and severe hypokinesis involving the inferoposterior wall consistent with infarction. Global moderate reduction in left ventricular systolic function. Left ventricular ejection fraction 30-35%.\par Right ventricle marginal with mild RV dysfunction\par Severe biatrial enlargement.\par Moderate mitral insufficiency.\par Mild dilatation of the ascending aorta.\par Comparison with a prior echocardiogram shows a slight reduction in left ventricular size and a slight improvement in left ventricular systolic function.\par  \par \par Nuclear stress test 2018\par Moderate-sized moderate to severe fixed defect of the inferior wall inferoseptum and for apical areas consistent with infarction.\par Left ventricle dilated with severe global left ventricular dysfunction.\par No significant ischemia noted.\par Relatively unchanged in comparison to her prior study 2016.\par \par CT of the chest 2018.\par Sinus Valsalva 4 cm\par Ascending thoracic aorta 4 cm\par Descending thoracic aorta 3 cm\par \par Impression \par \par 1. The descending thoracic aorta on CT is not as large as the echo had suggested.\par \par 2. 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 Mild improvement in the size and systolic function noted on the echo , will schedule repeat\par \par 3. He is not having any active heart failure or anginal symptoms at this time.\par \par 4. PPM interrogation shows continued V pacing and 1 episodes of NSVT of which he was asymptomatic.\par \par 5. Improvement noted in lipid panel with increasing Atorvastatin to 40 mg from 20 mg. \par \par 6. Blood pressure has been stable  ECG is at his baseline and unchanged.\par \par 7. No anginal symptoms.\par \par \par Plan\par 1. Follow up with Daniel/device tech as scheduled.\par 2. Continue all medications as prescribed\par 3. repeat blood work in 6 months\par 4. Echo TBA in 3 months with ICD check\par \par Patient knows to contact me with regard to any increasing shortness of breath, palpitations, chest pain or edema.\par \par Clinical follow up in 6 months\par Likely Nuclear stress at that time

## 2020-06-09 NOTE — HISTORY OF PRESENT ILLNESS
[FreeTextEntry1] : States that he has been feeling relatively well. Has some baseline fatigability which has not worsened.\par \par No symptoms related to his history of hypotension.\par \par Exercise consist of walking 2-3 days a week, admits to not fully exerting himself.\par \par No signs or symptoms of angina or heart failure at this time. \par \par ICD  interrogated today \par 1 run of NSVT 11 secs triggered no activity\par \par \par \par

## 2020-06-09 NOTE — REVIEW OF SYSTEMS
[Recent Weight Loss (___ Lbs)] : recent [unfilled] ~Ulb weight loss [Shortness Of Breath] : shortness of breath [Negative] : Heme/Lymph [Feeling Fatigued] : not feeling fatigued

## 2020-06-11 LAB — INR PPP: 2.9

## 2020-06-22 ENCOUNTER — RX RENEWAL (OUTPATIENT)
Age: 85
End: 2020-06-22

## 2020-06-25 LAB — INR PPP: 3.1

## 2020-07-10 LAB — INR PPP: 2.4

## 2020-07-23 LAB — INR PPP: 2.2

## 2020-08-06 LAB — INR PPP: 2.5

## 2020-08-25 LAB — INR PPP: 1.8

## 2020-09-03 LAB — INR PPP: 3

## 2020-09-08 ENCOUNTER — APPOINTMENT (OUTPATIENT)
Dept: CARDIOLOGY | Facility: CLINIC | Age: 85
End: 2020-09-08
Payer: MEDICARE

## 2020-09-08 PROCEDURE — 93289 INTERROG DEVICE EVAL HEART: CPT

## 2020-09-08 PROCEDURE — 93306 TTE W/DOPPLER COMPLETE: CPT

## 2020-09-10 ENCOUNTER — APPOINTMENT (OUTPATIENT)
Dept: CARDIOLOGY | Facility: CLINIC | Age: 85
End: 2020-09-10

## 2020-09-18 LAB — INR PPP: 3.4

## 2020-10-01 LAB — INR PPP: 2.4

## 2020-10-05 ENCOUNTER — APPOINTMENT (OUTPATIENT)
Dept: CARDIOLOGY | Facility: CLINIC | Age: 85
End: 2020-10-05
Payer: MEDICARE

## 2020-10-05 ENCOUNTER — NON-APPOINTMENT (OUTPATIENT)
Age: 85
End: 2020-10-05

## 2020-10-05 VITALS
DIASTOLIC BLOOD PRESSURE: 65 MMHG | TEMPERATURE: 98.2 F | HEIGHT: 76 IN | SYSTOLIC BLOOD PRESSURE: 110 MMHG | RESPIRATION RATE: 16 BRPM | HEART RATE: 55 BPM | WEIGHT: 230 LBS | BODY MASS INDEX: 28.01 KG/M2 | OXYGEN SATURATION: 97 %

## 2020-10-05 PROCEDURE — 93000 ELECTROCARDIOGRAM COMPLETE: CPT

## 2020-10-05 PROCEDURE — 99215 OFFICE O/P EST HI 40 MIN: CPT

## 2020-10-05 NOTE — REVIEW OF SYSTEMS
[Shortness Of Breath] : shortness of breath [Negative] : Heme/Lymph [Feeling Fatigued] : not feeling fatigued [Recent Weight Loss (___ Lbs)] : no recent weight loss

## 2020-10-05 NOTE — HISTORY OF PRESENT ILLNESS
[FreeTextEntry1] : Major complaint at this time is that of  back pain due to a herniated discs  and being treated through Dr. Dozier .  He is  scheduled for a series of steroidal injections starting 10/12/20, also mentions possible nerve ablation.\par \par No symptoms related to his history of hypotension.\par \par No signs or symptoms of angina or heart failure at this time. \par \par  ICD interrogation 9/8/20\par Battery life 5.3 years remaining\par  @ 90%\par Threshold = 0.5v @ 0.50 ms\par No ventricular arrhythmias\par \par Echo results will be discussed.\par Generally speaking he feels good. \par

## 2020-10-05 NOTE — ASSESSMENT
[FreeTextEntry1] : ECG: Underlying AFIB and V paced\par \par \par Lab Data 20\par Chol: 144\par HDL:   36\par LDL:   83\par Tr\par Creat: 0.82\par \par Echo 20\par LVEF 30-35%\par Mod. -Sev. decreased left ventricular systolic function\par Mildly enlarged  right ventricle.\par Severe biatrial enlargement\par Mild tricuspid regurgitation \par Mild mitral regurgitation \par Mild aortic regurgitation \par Mild pulmonic regurgitation \par Mild dilation of the aortic root and ascending aorta.\par \par \par ICD interrogation 20\par Battery life 5.3 years remaining\par  @ 90%\par Threshold = 0.5v @ 0.50 ms\par No ventricular arrhythmias\par \par Echocardiogram 3/7/19:\par LV size enlarged with abnormal septal motion and severe hypokinesis involving the inferoposterior wall consistent with infarction. Global moderate reduction in left ventricular systolic function. Left ventricular ejection fraction 30-35%.\par Right ventricle marginal with mild RV dysfunction\par Severe biatrial enlargement.\par Moderate mitral insufficiency.\par Mild dilatation of the ascending aorta.\par Comparison with a prior echocardiogram shows a slight reduction in left ventricular size and a slight improvement in left ventricular systolic function.\par  \par \par Nuclear stress test 2018\par Moderate-sized moderate to severe fixed defect of the inferior wall inferoseptum and for apical areas consistent with infarction.\par Left ventricle dilated with severe global left ventricular dysfunction.\par No significant ischemia noted.\par Relatively unchanged in comparison to her prior study 2016.\par \par CT of the chest 2018.\par Sinus Valsalva 4 cm\par Ascending thoracic aorta 4 cm\par Descending thoracic aorta 3 cm\par \par Impression \par \par 1. The descending thoracic aorta on 2018  cardiac CT is not as large as prior echo had suggested.\par \par 2. 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  There is been no interval change in the size of the left ventricle or of systolic function.\par \par 3. He is not having any active heart failure or anginal symptoms at this time.\par \par 4. PPM interrogation with no arrhythmias and good threshold. \par \par 5.  Echo with slight increase  in gradient of the aortic valve;\par     LVEF unchanged and stable at this time.       \par    Mitral valve insufficiency appears to be less. \par    Aortic root measuring 3.90 cm and ascending aorta measuring 3.90 cm .\par \par 6 . Blood pressures controlled. ECG is at his baseline and unchanged.\par \par 7. No anginal symptoms.\par \par 8.  Preoperative evaluation for a series of 3 separate injections with 2-week intervals\par \par Plan\par 1. Follow up with Daniel/device tech as scheduled.\par \par 2.  Because of the dilated cardiomyopathy and presence of underlying atrial fibrillation and valvular heart disease, would prefer to keep the patient anticoagulated as much as is possible, while he has a series of three separate spinal procedures..  Therefore, will transition to a NOACs perioperatively so that we can get effective anticoagulation and not be subtherapeutic as much.\par \par Instructions are as followed:\par  - Hold Clopidrogel  7 days preop for each injection\par -  Coumadin until 10/7/20 and stop ( needs to be held 5 days prior)\par - Receive injection on 10/12/20\par - Once cleared  to restart AC therapy by Dr. Dozier he will  start  Xarelto 15 mg QD.\par \par -Will speak to Dr. Dozier' office to get a scheduled time line of all injections to provide Mr. Cordero with dates to hold Xarelto. Will also need to discuss details for nerve ablation and the recommendations with re: to the ICD management\par \par -Pt given samples of Xarelto during this office visit.  If insurance company covers may consider  continuing indefinitely. \par \par 3. repeat blood work in 6 months\par \par 4. Pt cleared to proceed with spinal injections with Dr. Dozier. Magnet inhibition for ICD allowed. Will have device check technician reach out to provide appropriate plan of care. \par \par \par Patient knows to contact me with regard to any increasing shortness of breath, palpitations, chest pain or edema.\par \par Follow up already  scheduled for December.

## 2020-10-15 LAB — INR PPP: 1.4

## 2020-10-30 ENCOUNTER — RX RENEWAL (OUTPATIENT)
Age: 85
End: 2020-10-30

## 2020-10-30 RX ORDER — LEVOTHYROXINE SODIUM 0.11 MG/1
112 TABLET ORAL DAILY
Qty: 90 | Refills: 0 | Status: ACTIVE | COMMUNITY
Start: 2020-10-30 | End: 1900-01-01

## 2020-12-08 ENCOUNTER — APPOINTMENT (OUTPATIENT)
Dept: CARDIOLOGY | Facility: CLINIC | Age: 85
End: 2020-12-08
Payer: MEDICARE

## 2020-12-08 VITALS
HEART RATE: 75 BPM | WEIGHT: 226 LBS | RESPIRATION RATE: 16 BRPM | SYSTOLIC BLOOD PRESSURE: 110 MMHG | OXYGEN SATURATION: 97 % | HEIGHT: 76 IN | TEMPERATURE: 97.4 F | DIASTOLIC BLOOD PRESSURE: 70 MMHG | BODY MASS INDEX: 27.52 KG/M2

## 2020-12-08 PROCEDURE — 99215 OFFICE O/P EST HI 40 MIN: CPT

## 2020-12-08 PROCEDURE — 93289 INTERROG DEVICE EVAL HEART: CPT

## 2020-12-08 PROCEDURE — 93000 ELECTROCARDIOGRAM COMPLETE: CPT | Mod: 59

## 2020-12-08 NOTE — REASON FOR VISIT
[FreeTextEntry1] : Patient returns here for medical clearance prior to  nerve root ablation and  followup with regard to management of problems which include:\par \par 1. Mixed ischemic and nonischemic dilated cardiomyopathy\par \par 2. Chronic atrial fibrillation\par \par 3. 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 Valvular heart disease\par \par 5. Unspecified hypotension\par \par 6.  Chronic anticoagulation therapy

## 2020-12-08 NOTE — REVIEW OF SYSTEMS
[Feeling Fatigued] : not feeling fatigued [Recent Weight Loss (___ Lbs)] : recent [unfilled] ~Ulb weight loss [Shortness Of Breath] : shortness of breath [Negative] : Heme/Lymph

## 2020-12-08 NOTE — HISTORY OF PRESENT ILLNESS
[FreeTextEntry1] : Major complaint at this time is that of  back pain due to a herniated discs  and being treated through Dr. Dozier .  He had steroidal injections starting 10/12/20, which only temporize matters and in view of persistent pain now will have nerve ablation.\par \par No symptoms related to his history of hypotension.\par \par No signs or symptoms of angina or heart failure at this time. \par \par  ICD interrogation 9/8/20\par Battery life 5.3 years remaining\par  @ 86 %\par Threshold = 0.5v @ 0.50 ms\par No ventricular arrhythmias\par \par \par

## 2020-12-08 NOTE — ASSESSMENT
[FreeTextEntry1] : ECG: Underlying AFIB and V paced , PVC's \par \par \par Lab Data\par         20\par Chol: 144           139\par HDL:   36            36\par LDL:   83            79\par Tr           137\par Creat: 0.82\par \par Echo 20\par LVEF 30-35%\par Mod. -Sev. decreased left ventricular systolic function\par Mildly enlarged  right ventricle.\par Severe biatrial enlargement\par Mild tricuspid regurgitation \par Mild mitral regurgitation \par Mild aortic regurgitation \par Mild pulmonic regurgitation \par Mild dilation of the aortic root and ascending aorta.\par \par \par ICD interrogation today\par Battery life 5.3 years remaining\par  @ 86%\par Threshold = 0.5v @ 0.50 ms\par No ventricular arrhythmias\par \par Echocardiogram 3/7/19:\par LV size enlarged with abnormal septal motion and severe hypokinesis involving the inferoposterior wall consistent with infarction. Global moderate reduction in left ventricular systolic function. Left ventricular ejection fraction 30-35%.\par Right ventricle marginal with mild RV dysfunction\par Severe biatrial enlargement.\par Moderate mitral insufficiency.\par Mild dilatation of the ascending aorta.\par Comparison with a prior echocardiogram shows a slight reduction in left ventricular size and a slight improvement in left ventricular systolic function.\par  \par \par Nuclear stress test 2018\par Moderate-sized moderate to severe fixed defect of the inferior wall inferoseptum and for apical areas consistent with infarction.\par Left ventricle dilated with severe global left ventricular dysfunction.\par No significant ischemia noted.\par Relatively unchanged in comparison to her prior study 2016.\par \par CT of the chest 2018.\par Sinus Valsalva 4 cm\par Ascending thoracic aorta 4 cm\par Descending thoracic aorta 3 cm\par \par Impression \par \par 1. The descending thoracic aorta on 2018  cardiac CT is not as large as prior echo had suggested.\par \par 2. 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  There is been no interval change in the size of the left ventricle or of systolic function.\par \par 3. He is not having any active heart failure or anginal symptoms at this time.\par \par 4. PPM interrogation with no arrhythmias and good threshold. \par \par 5.  Echo with slight increase  in gradient of the aortic valve;\par     LVEF unchanged and stable at this time.       \par    Mitral valve insufficiency appears to be less. \par    Aortic root measuring 3.90 cm and ascending aorta measuring 3.90 cm .\par \par 6 . Blood pressures controlled. ECG is at his baseline and unchanged.\par \par 7. No anginal symptoms.\par \par 8.  Preoperative evaluation for a nerve root ablation procedure\par \par Plan\par 1.  Dr garza office is rranging for [facility] rep to come in to his office to provide inhibition of the defibrillator     during the procedure.\par \par 2.  We will continue the patient on Xarelto until 3 days before the procedure at which point he will stop it.\par \par  - Hold Clopidrogel  7 days preop\par   - Once cleared  to restart AC therapy by Dr. Garza he will  start  Xarelto 15 mg QD.\par \par -Pt interested in continuing to use Xarelto.  If the insurance company covers may consider  continuing indefinitely. \par \par 3. repeat blood work in 6 months\par \par 4.  Magnet inhibition for ICD allowed. Will have device check technician reach out to provide appropriate plan of care. \par \par \par

## 2020-12-21 ENCOUNTER — RX RENEWAL (OUTPATIENT)
Age: 85
End: 2020-12-21

## 2021-02-08 ENCOUNTER — RX RENEWAL (OUTPATIENT)
Age: 86
End: 2021-02-08

## 2021-02-12 ENCOUNTER — EMERGENCY (EMERGENCY)
Facility: HOSPITAL | Age: 86
LOS: 1 days | Discharge: HOME | End: 2021-02-12
Attending: EMERGENCY MEDICINE
Payer: MEDICARE

## 2021-02-12 VITALS
DIASTOLIC BLOOD PRESSURE: 73 MMHG | HEIGHT: 72 IN | SYSTOLIC BLOOD PRESSURE: 91 MMHG | OXYGEN SATURATION: 97 % | HEART RATE: 61 BPM | TEMPERATURE: 98 F | WEIGHT: 102.07 LBS | RESPIRATION RATE: 17 BRPM

## 2021-02-12 VITALS
RESPIRATION RATE: 18 BRPM | OXYGEN SATURATION: 95 % | DIASTOLIC BLOOD PRESSURE: 70 MMHG | SYSTOLIC BLOOD PRESSURE: 138 MMHG | HEART RATE: 71 BPM

## 2021-02-12 DIAGNOSIS — Z98.49 CATARACT EXTRACTION STATUS, UNSPECIFIED EYE: Chronic | ICD-10-CM

## 2021-02-12 DIAGNOSIS — Z98.89 OTHER SPECIFIED POSTPROCEDURAL STATES: Chronic | ICD-10-CM

## 2021-02-12 DIAGNOSIS — Z95.1 PRESENCE OF AORTOCORONARY BYPASS GRAFT: Chronic | ICD-10-CM

## 2021-02-12 DIAGNOSIS — Z95.810 PRESENCE OF AUTOMATIC (IMPLANTABLE) CARDIAC DEFIBRILLATOR: Chronic | ICD-10-CM

## 2021-02-12 DIAGNOSIS — Z98.890 OTHER SPECIFIED POSTPROCEDURAL STATES: Chronic | ICD-10-CM

## 2021-02-12 DIAGNOSIS — Z95.5 PRESENCE OF CORONARY ANGIOPLASTY IMPLANT AND GRAFT: Chronic | ICD-10-CM

## 2021-02-12 DIAGNOSIS — N28.0 ISCHEMIA AND INFARCTION OF KIDNEY: Chronic | ICD-10-CM

## 2021-02-12 LAB
ALBUMIN SERPL ELPH-MCNC: 3.8 G/DL — SIGNIFICANT CHANGE UP (ref 3.3–5.2)
ALP SERPL-CCNC: 68 U/L — SIGNIFICANT CHANGE UP (ref 40–120)
ALT FLD-CCNC: 20 U/L — SIGNIFICANT CHANGE UP
ANION GAP SERPL CALC-SCNC: 12 MMOL/L — SIGNIFICANT CHANGE UP (ref 5–17)
APTT BLD: 35.6 SEC — HIGH (ref 27.5–35.5)
AST SERPL-CCNC: 21 U/L — SIGNIFICANT CHANGE UP
BASOPHILS # BLD AUTO: 0.11 K/UL — SIGNIFICANT CHANGE UP (ref 0–0.2)
BASOPHILS NFR BLD AUTO: 1.1 % — SIGNIFICANT CHANGE UP (ref 0–2)
BILIRUB SERPL-MCNC: 0.4 MG/DL — SIGNIFICANT CHANGE UP (ref 0.4–2)
BLD GP AB SCN SERPL QL: SIGNIFICANT CHANGE UP
BUN SERPL-MCNC: 18 MG/DL — SIGNIFICANT CHANGE UP (ref 8–20)
CALCIUM SERPL-MCNC: 9.2 MG/DL — SIGNIFICANT CHANGE UP (ref 8.6–10.2)
CHLORIDE SERPL-SCNC: 102 MMOL/L — SIGNIFICANT CHANGE UP (ref 98–107)
CO2 SERPL-SCNC: 25 MMOL/L — SIGNIFICANT CHANGE UP (ref 22–29)
CREAT SERPL-MCNC: 1.07 MG/DL — SIGNIFICANT CHANGE UP (ref 0.5–1.3)
DIGOXIN SERPL-MCNC: 1.6 NG/ML — SIGNIFICANT CHANGE UP (ref 0.8–2)
EOSINOPHIL # BLD AUTO: 0.99 K/UL — HIGH (ref 0–0.5)
EOSINOPHIL NFR BLD AUTO: 10.3 % — HIGH (ref 0–6)
GLUCOSE SERPL-MCNC: 95 MG/DL — SIGNIFICANT CHANGE UP (ref 70–99)
HCT VFR BLD CALC: 47.7 % — SIGNIFICANT CHANGE UP (ref 39–50)
HGB BLD-MCNC: 15.7 G/DL — SIGNIFICANT CHANGE UP (ref 13–17)
IMM GRANULOCYTES NFR BLD AUTO: 0.4 % — SIGNIFICANT CHANGE UP (ref 0–1.5)
INR BLD: 1.35 RATIO — HIGH (ref 0.88–1.16)
LYMPHOCYTES # BLD AUTO: 2.42 K/UL — SIGNIFICANT CHANGE UP (ref 1–3.3)
LYMPHOCYTES # BLD AUTO: 25.2 % — SIGNIFICANT CHANGE UP (ref 13–44)
MAGNESIUM SERPL-MCNC: 2.1 MG/DL — SIGNIFICANT CHANGE UP (ref 1.6–2.6)
MCHC RBC-ENTMCNC: 31.8 PG — SIGNIFICANT CHANGE UP (ref 27–34)
MCHC RBC-ENTMCNC: 32.9 GM/DL — SIGNIFICANT CHANGE UP (ref 32–36)
MCV RBC AUTO: 96.6 FL — SIGNIFICANT CHANGE UP (ref 80–100)
MONOCYTES # BLD AUTO: 0.83 K/UL — SIGNIFICANT CHANGE UP (ref 0–0.9)
MONOCYTES NFR BLD AUTO: 8.6 % — SIGNIFICANT CHANGE UP (ref 2–14)
NEUTROPHILS # BLD AUTO: 5.22 K/UL — SIGNIFICANT CHANGE UP (ref 1.8–7.4)
NEUTROPHILS NFR BLD AUTO: 54.4 % — SIGNIFICANT CHANGE UP (ref 43–77)
NT-PROBNP SERPL-SCNC: 1075 PG/ML — HIGH (ref 0–300)
PLATELET # BLD AUTO: 226 K/UL — SIGNIFICANT CHANGE UP (ref 150–400)
POTASSIUM SERPL-MCNC: 4.6 MMOL/L — SIGNIFICANT CHANGE UP (ref 3.5–5.3)
POTASSIUM SERPL-SCNC: 4.6 MMOL/L — SIGNIFICANT CHANGE UP (ref 3.5–5.3)
PROT SERPL-MCNC: 7.1 G/DL — SIGNIFICANT CHANGE UP (ref 6.6–8.7)
PROTHROM AB SERPL-ACNC: 15.4 SEC — HIGH (ref 10.6–13.6)
RBC # BLD: 4.94 M/UL — SIGNIFICANT CHANGE UP (ref 4.2–5.8)
RBC # FLD: 13.2 % — SIGNIFICANT CHANGE UP (ref 10.3–14.5)
SODIUM SERPL-SCNC: 139 MMOL/L — SIGNIFICANT CHANGE UP (ref 135–145)
T3 SERPL-MCNC: 92 NG/DL — SIGNIFICANT CHANGE UP (ref 80–200)
T4 AB SER-ACNC: 7.2 UG/DL — SIGNIFICANT CHANGE UP (ref 4.5–12)
TROPONIN T SERPL-MCNC: <0.01 NG/ML — SIGNIFICANT CHANGE UP (ref 0–0.06)
TSH SERPL-MCNC: 2.76 UIU/ML — SIGNIFICANT CHANGE UP (ref 0.27–4.2)
WBC # BLD: 9.61 K/UL — SIGNIFICANT CHANGE UP (ref 3.8–10.5)
WBC # FLD AUTO: 9.61 K/UL — SIGNIFICANT CHANGE UP (ref 3.8–10.5)

## 2021-02-12 PROCEDURE — 86901 BLOOD TYPING SEROLOGIC RH(D): CPT

## 2021-02-12 PROCEDURE — 83735 ASSAY OF MAGNESIUM: CPT

## 2021-02-12 PROCEDURE — 71045 X-RAY EXAM CHEST 1 VIEW: CPT | Mod: 26

## 2021-02-12 PROCEDURE — 80053 COMPREHEN METABOLIC PANEL: CPT

## 2021-02-12 PROCEDURE — 36415 COLL VENOUS BLD VENIPUNCTURE: CPT

## 2021-02-12 PROCEDURE — 93010 ELECTROCARDIOGRAM REPORT: CPT

## 2021-02-12 PROCEDURE — 71045 X-RAY EXAM CHEST 1 VIEW: CPT

## 2021-02-12 PROCEDURE — 99284 EMERGENCY DEPT VISIT MOD MDM: CPT | Mod: 25

## 2021-02-12 PROCEDURE — 85730 THROMBOPLASTIN TIME PARTIAL: CPT

## 2021-02-12 PROCEDURE — U0005: CPT

## 2021-02-12 PROCEDURE — 93005 ELECTROCARDIOGRAM TRACING: CPT

## 2021-02-12 PROCEDURE — 85610 PROTHROMBIN TIME: CPT

## 2021-02-12 PROCEDURE — U0003: CPT

## 2021-02-12 PROCEDURE — 83880 ASSAY OF NATRIURETIC PEPTIDE: CPT

## 2021-02-12 PROCEDURE — 85025 COMPLETE CBC W/AUTO DIFF WBC: CPT

## 2021-02-12 PROCEDURE — 84436 ASSAY OF TOTAL THYROXINE: CPT

## 2021-02-12 PROCEDURE — 86900 BLOOD TYPING SEROLOGIC ABO: CPT

## 2021-02-12 PROCEDURE — 84443 ASSAY THYROID STIM HORMONE: CPT

## 2021-02-12 PROCEDURE — 84480 ASSAY TRIIODOTHYRONINE (T3): CPT

## 2021-02-12 PROCEDURE — 84484 ASSAY OF TROPONIN QUANT: CPT

## 2021-02-12 PROCEDURE — 99285 EMERGENCY DEPT VISIT HI MDM: CPT | Mod: GC

## 2021-02-12 PROCEDURE — 86850 RBC ANTIBODY SCREEN: CPT

## 2021-02-12 PROCEDURE — 80162 ASSAY OF DIGOXIN TOTAL: CPT

## 2021-02-12 NOTE — ED ADULT TRIAGE NOTE - CHIEF COMPLAINT QUOTE
sent by [facility] for hypotension; pt c/o intermittent lightheadness/dizziness/malaise/fatigue/body aches x 1 week. hx CABG w/ mult stents

## 2021-02-12 NOTE — ED PROVIDER NOTE - PROGRESS NOTE DETAILS
[facility] device interrogated -- no recent events. Pt remains in no distress.  Diagnostic results reviewed and discussed with pt.  Stable for discharge with outpatient f/u.

## 2021-02-12 NOTE — ED PROVIDER NOTE - PHYSICAL EXAMINATION
Constitutional: Awake, alert, in no acute distress  Eyes: no scleral icterus, no conjunctival pallor  HENT: normocephalic, atraumatic, moist oral mucosa  Neck: supple  CV: RRR, no murmur  Pulm: non-labored respirations, CTAB  Abdomen: soft, non-tender, non-distended, no CVAT  Extremities: no edema, no deformity  Skin: no rash, no jaundice  Neuro: AAOx3, no facial droop, speech clear, moving all extremities equally, no focal deficits

## 2021-02-12 NOTE — ED PROVIDER NOTE - ATTENDING CONTRIBUTION TO CARE
Efrain: I performed a face to face evaluation of this patient and performed a full history and physical examination on the patient.  I agree with the resident's history, physical examination, and plan of the patient unless otherwise noted. My brief assessment is as follows: pmh as documented, feeling generalized fatigue and weakness over past few days. no focal complaints. eating/drinking normally, no pain, no sob, no f/c. hx hypotension on midodrine. no headache. symptoms more orthostatic. labs, xr, ppm interrogation, reassess.

## 2021-02-12 NOTE — ED PROVIDER NOTE - OBJECTIVE STATEMENT
85y M w/ hx CAD s/p stent x4 and CABGx3, CHF, s/p AICD/PPM, Afib - on digoxin and Xarelto, HTN, orthostatic hypotension - on midodrine, hypothyroidism, presenting for weakness.  Pt reports feeling generally weak and fatigued for the past 2 days, and has been sleeping in bed most of the day.  Denies fever, chills, cough, n/v/d, chest pain, SOB, increased leg swelling, abdominal pain, dysuria, black/bloody stools or other complaints.  No recent changes in medications or sick contacts.  Pt notes chronic positional dizziness that is unchanged from baseline.  Pt was evaluated at urgent care today, where he had EKG done and rapid COVID test that was negative.  Pt was then referred to the ED for further evaluation.  Cardiologist is Fortunato Stone.

## 2021-02-12 NOTE — ED PROVIDER NOTE - PMH
Benign prostatic hyperplasia with urinary obstruction    Chronic atrial fibrillation    Coronary artery disease involving native coronary artery of native heart, angina presence unspecifie    Elevated prostate specific antigen (PSA)    Essential hypertension    HFrEF (heart failure with reduced ejection fraction)    History of ischemic cardiomyopathy    Hypothyroid    MI (myocardial infarction)    Mixed hyperlipidemia    Nonischemic cardiomyopathy    Orthostatic hypotension    Osteoarthritis

## 2021-02-12 NOTE — ED PROVIDER NOTE - PATIENT PORTAL LINK FT
You can access the FollowMyHealth Patient Portal offered by [facility] by registering at the following website: [email or web address]. By joining [facility]’s FollowMyHealth portal, you will also be able to view your health information using other applications (apps) compatible with our system.

## 2021-02-12 NOTE — ED ADULT NURSE NOTE - OBJECTIVE STATEMENT
Pt. sent in from urgent care for evaluation of hypotension, weakness and fatigue.  Pt reports feeling generally weak and fatigued for the past 2 days and states that he went to bed last night at 10pm and did not awaken until 4pm.  Denies fever, chills, cough, n/v/d, chest pain, SOB, sick contacts, increased leg swelling, abdominal pain, Pt notes chronic positional dizziness that is unchanged from baseline.  Pt was evaluated at urgent care today, where he had EKG done and rapid COVID test that was negative.  Pt. with history of hypotension on midodrine, pacemaker with AICD; pt. vpaced on CM.  Pt. A&Ox4, normotensive on evaluation of pt.

## 2021-02-12 NOTE — ED PROVIDER NOTE - CARE PROVIDER_API CALL
Fortunato Stone)  Cardiovascular Disease  [address] [place] [address]  Phone: [phone]  Fax: [phone]  Follow Up Time:

## 2021-02-12 NOTE — ED PROVIDER NOTE - NSFOLLOWUPINSTRUCTIONS_ED_ALL_ED_FT
PLEASE FOLLOW UP WITH YOUR PRIMARY CARE DOCTOR AND CARDIOLOGIST.  RETURN TO THE EMERGENCY ROOM FOR ANY NEW ISSUES, INCLUDING FEVER, CHEST PAIN, SHORTNESS OF BREATH, FAINTING, ETC.    -------------------------      Weakness    WHAT YOU NEED TO KNOW:    Weakness is a loss of muscle strength. It may be caused by brain, nerve, or muscle problems. Physical and mental conditions such as heart problems, pregnancy, dehydration, or depression may also cause weakness. Reactions to certain drugs can cause weakness. Parts of your body may become weak if you need to wear a cast or splint or have been on bed rest for a long time.    DISCHARGE INSTRUCTIONS:    Call 911 for any of the following:   •You have any of the following signs of a stroke: ?Numbness or drooping on one side of your face       ?Weakness in an arm or leg      ?Confusion or difficulty speaking      ?Dizziness, a severe headache, or vision loss      •You lose feeling in your weakened body area.      •You have electric shock-like feelings down your arms and legs when you flex or move your neck.      •You have sudden or increased trouble speaking, swallowing, or breathing.      Return to the emergency department if:   •You have severe pain in your back, arms, or legs that worsens.      •You have sudden or worsened muscle weakness or loss of movement.      •You are not able to control when you urinate or have a bowel movement.      Contact your healthcare provider if:   •You feel depressed or anxious.       •You have questions or concerns about your condition or care.       Manage weakness:   •Use assistive devices as directed. These help protect you from injury. Examples include a walker or cane. Have someone install handrails in your home. These will help you get out of a bathtub or stand up from a toilet. Use a shower chair so you can sit while you shower. Sit down on the toilet or another chair to dry off and put on your clothes. Get help going up and down stairs if your legs are weak.       •Go to physical or occupational therapy if directed. A physical therapist can teach you exercises to help strengthen weak muscles. An occupational therapist can show you ways to do your daily activities more easily. For example, light forks and spoons can be easier to use if you have hand weakness. You may also learn ways to organize your household items so you are not moving heavy items.      •Balance rest with exercise. Exercise can help increase your muscle strength and energy. Do not exercise for long periods at a time. Take breaks often to rest. Too much exercise can cause muscle strain or make you more tired. Ask your healthcare provider how much exercise is right for you.      •Eat a variety of healthy foods. Too much or too little food may cause weakness or tiredness. Ask your healthcare provider what a healthy amount of food is for you. Healthy foods include fruits, vegetables, whole-grain breads, low-fat dairy products, lean meats and fish, nuts, and cooked beans.      •Do not smoke. Nicotine and other chemicals in cigarettes and cigars can make your symptoms worse, and can cause lung damage. Ask your healthcare provider for information if you currently smoke and need help to quit. E-cigarettes or smokeless tobacco still contain nicotine. Talk to your healthcare provider before you use these products.       •Do not use caffeine, alcohol, or illegal drugs. These may cause muscle twitching, which could lead to worsened weakness.       Follow up with your healthcare provider as directed: Write down your questions so you remember to ask them during your visits.

## 2021-02-12 NOTE — ED PROVIDER NOTE - CLINICAL SUMMARY MEDICAL DECISION MAKING FREE TEXT BOX
85y M w/ hx CAD, CHF, Afib, HTN, orthostatic hypotension, hypothyroid, presenting to the ED for generalized weakness.  Pt appears well and in no distress.  Initial BP at triage was 91/73 but improved to 111/59 at time of exam.  Will check EKG, CXR, labs, UA.  Will interrogate [facility] AICD/PPM, reassess.

## 2021-02-13 LAB — SARS-COV-2 RNA SPEC QL NAA+PROBE: SIGNIFICANT CHANGE UP

## 2021-02-15 PROBLEM — I95.1 ORTHOSTATIC HYPOTENSION: Chronic | Status: ACTIVE | Noted: 2021-02-12

## 2021-02-16 ENCOUNTER — APPOINTMENT (OUTPATIENT)
Dept: CARDIOLOGY | Facility: CLINIC | Age: 86
End: 2021-02-16
Payer: MEDICARE

## 2021-02-16 VITALS
DIASTOLIC BLOOD PRESSURE: 60 MMHG | TEMPERATURE: 97.2 F | BODY MASS INDEX: 27.52 KG/M2 | SYSTOLIC BLOOD PRESSURE: 90 MMHG | RESPIRATION RATE: 16 BRPM | WEIGHT: 226 LBS | HEART RATE: 60 BPM | HEIGHT: 76 IN

## 2021-02-16 PROCEDURE — 93000 ELECTROCARDIOGRAM COMPLETE: CPT

## 2021-02-16 PROCEDURE — 99215 OFFICE O/P EST HI 40 MIN: CPT

## 2021-02-16 NOTE — PHYSICAL EXAM
[FreeTextEntry1] :                   Fatigued appearing and nourished with no obvious deformities or distress.\par \par Eyes: \par No conjunctival injection and no xanthelasmas.\par HEENT: \par Normocephalic.Normal oral mucosa. No pallor or cyanosis\par Neck: \par No jugular venous distension. with normal A and V wave forms. No palpable adenopathy.\par Cardiovascular: \par Normal rate and rhythm with normal S1, S2 and a grade 1/6 systolic murmur. Distal arterial pulses are normal. No significant peripheral edema.\par Pulmonary: \par Lungs are clear to auscultation and percussion. Normal respiratory pattern without any accessory muscle use\par Abdomen: \par Soft, non-tender ; no palpable organomegaly or masses.\par Extremities:\par No digital clubbing, cyanosis or ischemic changes.\par Skin: \par No skin lesions, rashes, ulcers or xanthomas.\par Psychiatric: \par Alert and oriented to person, place and time. Appropriate mood and affect.

## 2021-02-16 NOTE — ASSESSMENT
[FreeTextEntry1] : ECG: Underlying AFIB and V paced , PVC's \par \par \par Lab Data\par         20\par Chol: 144           139\par HDL:   36            36\par LDL:   83            79\par Tr           137\par Creat: 0.82\par \par Echo 20\par LVEF 30-35%\par Mod. -Sev. decreased left ventricular systolic function\par Mildly enlarged  right ventricle.\par Severe biatrial enlargement\par Mild tricuspid regurgitation \par Mild mitral regurgitation \par Mild aortic regurgitation \par Mild pulmonic regurgitation \par Mild dilation of the aortic root and ascending aorta.\par \par \par ICD interrogation \par Battery life 5.3 years remaining\par  @ 86%\par Threshold = 0.5v @ 0.50 ms\par No ventricular arrhythmias\par \par Echocardiogram 3/7/19:\par LV size enlarged with abnormal septal motion and severe hypokinesis involving the inferoposterior wall consistent with infarction. Global moderate reduction in left ventricular systolic function. Left ventricular ejection fraction 30-35%.\par Right ventricle marginal with mild RV dysfunction\par Severe biatrial enlargement.\par Moderate mitral insufficiency.\par Mild dilatation of the ascending aorta.\par Comparison with a prior echocardiogram shows a slight reduction in left ventricular size and a slight improvement in left ventricular systolic function.\par  \par \par Nuclear stress test 2018\par Moderate-sized moderate to severe fixed defect of the inferior wall inferoseptum and for apical areas consistent with infarction.\par Left ventricle dilated with severe global left ventricular dysfunction.\par No significant ischemia noted.\par Relatively unchanged in comparison to her prior study 2016.\par \par CT of the chest 2018.\par Sinus Valsalva 4 cm\par Ascending thoracic aorta 4 cm\par Descending thoracic aorta 3 cm\par \par Impression \par \par 1.  Patient with a prior history of hypotension that had been better for a couple of years, now seemingly recurring.\par Lack of regular activity and exercise seems to be the only change in his daily life.  His symptoms do not seem to be due to arrhythmia, angina or heart failure.  \par \par 2. The descending thoracic aorta on 2018  cardiac CT is not as large as prior echo had suggested.\par \par 3. 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  There is been no interval change in the size of the left ventricle or of systolic function.\par \par 4.  ICD interrogation with no arrhythmias and good threshold.,  Sensitivity and battery life\par \par 5.  Echo with slight increase  in gradient of the aortic valve;\par     LVEF unchanged and stable at this time.       \par    Mitral valve insufficiency appears to be less. \par    Aortic root measuring 3.90 cm and ascending aorta measuring 3.90 cm .\par \par \par Plan\par 1.  We will decrease valsartan from 80 to 40 mg daily and have him stagger that dosing and take it midday in      between carvedilol doses\par \par 2.  We will increase midodrine to 10 mg p.o. twice daily\par \par 3.  No indication for any other cardiac testing at this point in time.\par \par 4.  GI evaluation to be considered in view of the anorexia and weight loss.\par \par

## 2021-02-16 NOTE — REASON FOR VISIT
[FreeTextEntry1] : Patient returns here for evaluation after a [facility] emergency room visit 2/12/2021.\par The day before he began feeling weak and tired.  Slept for nearly an entire day.\par Went to a urgent health care center her blood pressure was found to be about 90/50 and sent to the ER for further evaluation.\par Blood pressure of 90/70 was confirmed in the ER.\par Laboratory data was fairly unremarkable:\par Digoxin level 1.6\par Hemoglobin 15.7\par Creatinine 1.07\par BNP 1075\par INR 1.35 \par \par 2 days later the patient stopped taking valsartan and began feeling a bit better.  Still feels he has been very tired.  Review of systems is mostly remarkable for decreased appetite and a prolonged period of weight loss.\par \par He is also here for followup with regard to management of problems which include:\par \par 1. Mixed ischemic and nonischemic dilated cardiomyopathy\par \par 2. Chronic atrial fibrillation\par \par 3. 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 Valvular heart disease\par \par 5. Unspecified hypotension\par \par 6.  Chronic anticoagulation therapy

## 2021-02-16 NOTE — HISTORY OF PRESENT ILLNESS
[FreeTextEntry1] : \par \par No symptoms related to his history of hypotension.\par \par No signs or symptoms of angina or heart failure at this time. \par \par  ICD interrogation 12/8/2020\par Battery life 5.3 years remaining\par  @ 86 %\par Threshold = 0.5v @ 0.50 ms\par No ventricular arrhythmias\par \par \par

## 2021-03-08 ENCOUNTER — NON-APPOINTMENT (OUTPATIENT)
Age: 86
End: 2021-03-08

## 2021-03-09 ENCOUNTER — APPOINTMENT (OUTPATIENT)
Dept: CARDIOLOGY | Facility: CLINIC | Age: 86
End: 2021-03-09
Payer: MEDICARE

## 2021-03-09 PROCEDURE — 93289 INTERROG DEVICE EVAL HEART: CPT

## 2021-03-10 ENCOUNTER — APPOINTMENT (OUTPATIENT)
Dept: CARDIOLOGY | Facility: CLINIC | Age: 86
End: 2021-03-10
Payer: MEDICARE

## 2021-03-10 VITALS
WEIGHT: 224 LBS | DIASTOLIC BLOOD PRESSURE: 65 MMHG | BODY MASS INDEX: 27.28 KG/M2 | TEMPERATURE: 97.2 F | OXYGEN SATURATION: 97 % | RESPIRATION RATE: 16 BRPM | HEART RATE: 71 BPM | SYSTOLIC BLOOD PRESSURE: 118 MMHG | HEIGHT: 76 IN

## 2021-03-10 PROCEDURE — 99214 OFFICE O/P EST MOD 30 MIN: CPT

## 2021-03-10 PROCEDURE — 93000 ELECTROCARDIOGRAM COMPLETE: CPT

## 2021-03-18 ENCOUNTER — NON-APPOINTMENT (OUTPATIENT)
Age: 86
End: 2021-03-18

## 2021-04-09 ENCOUNTER — NON-APPOINTMENT (OUTPATIENT)
Age: 86
End: 2021-04-09

## 2021-04-13 NOTE — ADDENDUM
[FreeTextEntry1] : Patient with some anorexia and weight loss now for colonoscopy and possible upper endoscopy.\par No absolute cardiac contraindication to his proceeding.\par \par Concerns will include his tendency to low blood pressure and the impact that his preprocedural cleanse may have on that.  The need for adequate hydration pre procedurally will need to be emphasized.\par \par Patient may hold aspirin and Plavix for 5 days and Xarelto for 2 days before the procedure.\par Carvedilol to be taken with a sip of water the morning of the procedure.\par \par Feel free to contact me with regard to any questions.

## 2021-04-13 NOTE — REASON FOR VISIT
[FreeTextEntry1] :  85 year old male presenting for cardiac evaluation with regard to management of problems which include:\par \par 1. Mixed ischemic and nonischemic dilated cardiomyopathy\par \par 2. Chronic atrial fibrillation\par \par 3. 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 Valvular heart disease\par \par 5. Unspecified hypotension\par \par 6.  Chronic anticoagulation therapy

## 2021-04-13 NOTE — HISTORY OF PRESENT ILLNESS
[FreeTextEntry1] : During our last visit we discussed  a [facility] ER visit , found to be hypotensive, this had happened on several occasions leading up to this, . \par \par Blood pressure of 90/70 was confirmed in the ER.\par Laboratory data was fairly unremarkable:\par Digoxin level 1.6\par Hemoglobin 15.7\par Creatinine 1.07\par BNP 1075\par INR 1.35 \par \par We decreased his Valsartan from 80 to 40, instructed to stagger with Carvedilol and increased his Midodrine from BID to TID. Since these changes he believes he feels better, has not checked his blood pressure\par \par Symptoms associated with hypotension have not returned. \par \par No signs or symptoms of angina or heart failure at this time. \par \par

## 2021-04-13 NOTE — ASSESSMENT
[FreeTextEntry1] : ECG: Underlying AFIB and V paced , PVC's \par \par \par Lab Data\par         20\par Chol: 144           139\par HDL:   36            36\par LDL:   83            79\par Tr           137\par Creat: 0.82\par \par Echo 20\par LVEF 30-35%\par Mod. -Sev. decreased left ventricular systolic function\par Mildly enlarged  right ventricle.\par Severe biatrial enlargement\par Mild tricuspid regurgitation \par Mild mitral regurgitation \par Mild aortic regurgitation \par Mild pulmonic regurgitation \par Mild dilation of the aortic root and ascending aorta.\par \par \par ICD interrogation \par Battery life 5.3 years remaining\par  @ 86%\par Threshold = 0.5v @ 0.50 ms\par No ventricular arrhythmias\par \par Echocardiogram 3/7/19:\par LV size enlarged with abnormal septal motion and severe hypokinesis involving the inferoposterior wall consistent with infarction. Global moderate reduction in left ventricular systolic function. Left ventricular ejection fraction 30-35%.\par Right ventricle marginal with mild RV dysfunction\par Severe biatrial enlargement.\par Moderate mitral insufficiency.\par Mild dilatation of the ascending aorta.\par Comparison with a prior echocardiogram shows a slight reduction in left ventricular size and a slight improvement in left ventricular systolic function.\par  \par \par Nuclear stress test 2018\par Moderate-sized moderate to severe fixed defect of the inferior wall inferoseptum and for apical areas consistent with infarction.\par Left ventricle dilated with severe global left ventricular dysfunction.\par No significant ischemia noted.\par Relatively unchanged in comparison to her prior study 2016.\par \par CT of the chest 2018.\par Sinus Valsalva 4 cm\par Ascending thoracic aorta 4 cm\par Descending thoracic aorta 3 cm\par \par Impression \par \par 1.  Patient with a prior history of hypotension that had been better for a couple of years, now recurring.\par      Lack of regular activity and exercise seems to be the only change in his daily life.  His symptoms do not seem      to be due to arrhythmia, angina or heart failure.  \par      Clinical improvement with the aforementioned medication changes which included: Reducing valsartan to 40      mg,  increasing midodrine to 10 mg p.o. 3 times daily, staggering medications\par \par 2. The descending thoracic aorta on 2018  cardiac CT is not as large as prior echo had suggested.\par \par 3. 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  There is been no interval change in the size of the left ventricle or of systolic function.\par \par 4.  ICD interrogation with no arrhythmias and good threshold.,  Sensitivity and battery life\par \par 5.  Echo with slight increase  in gradient of the aortic valve;\par     LVEF unchanged and stable at this time.       \par    Mitral valve insufficiency appears to be less. \par    Aortic root measuring 3.90 cm and ascending aorta measuring 3.90 cm .\par \par \par Plan\par 1.  Continuation of all current medications as this\par \par 2.  Contact me with regard to any further episodes of hypotension\par \par 3.  No indication for any other cardiac testing at this point in time.\par \par 4.  GI evaluation to be considered in view of the anorexia and weight loss.\par \par

## 2021-04-13 NOTE — REVIEW OF SYSTEMS
[Shortness Of Breath] : shortness of breath [Negative] : Heme/Lymph [Recent Weight Loss (___ Lbs)] : recent [unfilled] ~Ulb weight loss [Feeling Fatigued] : not feeling fatigued

## 2021-05-07 ENCOUNTER — APPOINTMENT (OUTPATIENT)
Dept: CARDIOLOGY | Facility: CLINIC | Age: 86
End: 2021-05-07
Payer: MEDICARE

## 2021-05-07 VITALS
TEMPERATURE: 97.5 F | HEART RATE: 63 BPM | BODY MASS INDEX: 26.42 KG/M2 | SYSTOLIC BLOOD PRESSURE: 118 MMHG | DIASTOLIC BLOOD PRESSURE: 62 MMHG | OXYGEN SATURATION: 97 % | HEIGHT: 76 IN | RESPIRATION RATE: 16 BRPM | WEIGHT: 217 LBS

## 2021-05-07 PROCEDURE — 99214 OFFICE O/P EST MOD 30 MIN: CPT

## 2021-05-07 PROCEDURE — 93000 ELECTROCARDIOGRAM COMPLETE: CPT

## 2021-05-07 RX ORDER — RIVAROXABAN 15 MG/1
15 TABLET, FILM COATED ORAL
Qty: 90 | Refills: 1 | Status: DISCONTINUED | COMMUNITY
Start: 2020-10-05 | End: 2021-05-07

## 2021-05-07 RX ORDER — SERTRALINE HYDROCHLORIDE 50 MG/1
50 TABLET, FILM COATED ORAL
Refills: 0 | Status: ACTIVE | COMMUNITY

## 2021-05-07 NOTE — HISTORY OF PRESENT ILLNESS
[FreeTextEntry1] : Prior hx:\par In Feb. he was , found to be hypotensive, this had happened on several occasions leading up to this, . \par  \par Blood pressure of 90/70 was confirmed in the ER at [facility]. \par At that point we  decreased his Valsartan from 80 to 40, instructed to stagger with Carvedilol and increased his Midodrine from BID to TID.  As above, valsartan was eventually entirely discontinued.  He remains off of it at this time\par \par No signs or symptoms of angina or heart failure at this time. \par \par

## 2021-05-07 NOTE — REVIEW OF SYSTEMS
[Weight Loss (___ Lbs)] : [unfilled] ~Ulb weight loss [FreeTextEntry2] : Nearly 35 pounds of weight loss over the last 2 years

## 2021-05-07 NOTE — REASON FOR VISIT
[FreeTextEntry1] :  85 year old male presenting for cardiac evaluation with regard to management of problems which include:\par \par 1. Mixed ischemic and nonischemic dilated cardiomyopathy\par \par 2. Chronic atrial fibrillation\par \par 3. 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 Valvular heart disease\par \par 5. Unspecified hypotension\par \par 6.  Chronic anticoagulation therapy\par \par In March received a call from his PCP that his BP was 78/42, we discontinued his Valsartan. Repeat BP shorlty after  averaging 134/68 and has continued off med.  More recently numbers at home ranging 137 /70 and 120/65.\par \par In April he was cleared for a colonoscopy and had 15 polyps removed, biopsy results pending but has a hx of. Continues to have weight loss with a good appetite. \par \par Would like to transition back to Coumadin, Xarelto is to expensive. Does not want to apply for patient assistance program. \par \par Did have one episode of lightheadedness last night, continues to take Midodrine.\par

## 2021-05-07 NOTE — ASSESSMENT
[FreeTextEntry1] : ECG: Underlying AFIB and V paced , PVC's \par \par \par Lab Data\par         20\par Chol: 144           139\par HDL:   36            36\par LDL:   83            79\par Tr           137\par Creat: 0.82\par \par Echo 20\par LVEF 30-35%\par Mod. -Sev. decreased left ventricular systolic function\par Mildly enlarged  right ventricle.\par Severe biatrial enlargement\par Mild tricuspid regurgitation \par Mild mitral regurgitation \par Mild aortic regurgitation \par Mild pulmonic regurgitation \par Mild dilation of the aortic root and ascending aorta.\par \par \par ICD interrogation \par Battery life 5.3 years remaining\par  @ 86%\par Threshold = 0.5v @ 0.50 ms\par No ventricular arrhythmias\par \par Echocardiogram 3/7/19:\par LV size enlarged with abnormal septal motion and severe hypokinesis involving the inferoposterior wall consistent with infarction. Global moderate reduction in left ventricular systolic function. Left ventricular ejection fraction 30-35%.\par Right ventricle marginal with mild RV dysfunction\par Severe biatrial enlargement.\par Moderate mitral insufficiency.\par Mild dilatation of the ascending aorta.\par Comparison with a prior echocardiogram shows a slight reduction in left ventricular size and a slight improvement in left ventricular systolic function.\par  \par \par Nuclear stress test 2018\par Moderate-sized moderate to severe fixed defect of the inferior wall inferoseptum and for apical areas consistent with infarction.\par Left ventricle dilated with severe global left ventricular dysfunction.\par No significant ischemia noted.\par Relatively unchanged in comparison to her prior study 2016.\par \par CT of the chest 2018.\par Sinus Valsalva 4 cm\par Ascending thoracic aorta 4 cm\par Descending thoracic aorta 3 cm\par \par Impression \par \par 1.  Patient with a prior history of hypotension that had been better for a couple of years, now recurring.\par      Lack of regular activity and exercise seems to be the only change in his daily life.  His symptoms do not seem         to be due to arrhythmia, angina or heart failure.  \par      Clinical improvement with the aforementioned medication changes which included: Stopping Valsartan                     and increasing midodrine to 10 mg p.o. 3 times daily, staggering medications\par \par 2. 2018 -  descending thoracic aorta cardiac CT is not as large as prior echo had suggested.\par \par 3. 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  There is been no interval change in the size of the left ventricle or of systolic function.\par \par 4.  ICD interrogation with no arrhythmias and good threshold.,  Sensitivity and battery life\par \par 5.  Echo with slight increase  in gradient of the aortic valve;\par     LVEF unchanged and stable at this time.       \par    Mitral valve insufficiency appears to be less. \par    Aortic root measuring 3.90 cm and ascending aorta measuring 3.90 cm .\par \par 6. GI work up in progress for polyps, results pending. Down 7 lbs since March but eats. Unsure of etiology,     advised to discuss further with GI.\par \par 7. ECG unchanged. Xarelto to expensive. \par \par Plan\par 1.  Valsartan  and Xarelto discontinued indefinitely.  Will transition back to Coumadin 6 mg daily otherwise continue        all other meds. \par    - Recheck INR in 2 weeks. Will have done at local lab.\par \par 2.  Contact me with regard to any further episodes of hypotension\par \par 3.  No indication for any other cardiac testing at this point in time.\par \par 4.  Monitor weight loss.\par \par 5. BW through PCP \par \par Clinical follow up in 3 months \par \par

## 2021-06-08 ENCOUNTER — APPOINTMENT (OUTPATIENT)
Dept: CARDIOLOGY | Facility: CLINIC | Age: 86
End: 2021-06-08
Payer: MEDICARE

## 2021-06-08 PROCEDURE — 93289 INTERROG DEVICE EVAL HEART: CPT

## 2021-06-11 ENCOUNTER — APPOINTMENT (OUTPATIENT)
Dept: CARDIOLOGY | Facility: CLINIC | Age: 86
End: 2021-06-11
Payer: MEDICARE

## 2021-06-11 VITALS — SYSTOLIC BLOOD PRESSURE: 74 MMHG | DIASTOLIC BLOOD PRESSURE: 50 MMHG

## 2021-06-11 VITALS — DIASTOLIC BLOOD PRESSURE: 54 MMHG | SYSTOLIC BLOOD PRESSURE: 90 MMHG

## 2021-06-11 LAB — INR PPP: 4.1

## 2021-06-11 PROCEDURE — 93793 ANTICOAG MGMT PT WARFARIN: CPT

## 2021-06-18 ENCOUNTER — NON-APPOINTMENT (OUTPATIENT)
Age: 86
End: 2021-06-18

## 2021-06-18 ENCOUNTER — APPOINTMENT (OUTPATIENT)
Dept: CARDIOLOGY | Facility: CLINIC | Age: 86
End: 2021-06-18
Payer: MEDICARE

## 2021-06-18 ENCOUNTER — TRANSCRIPTION ENCOUNTER (OUTPATIENT)
Age: 86
End: 2021-06-18

## 2021-06-18 PROCEDURE — 93793 ANTICOAG MGMT PT WARFARIN: CPT

## 2021-06-24 VITALS
OXYGEN SATURATION: 98 % | RESPIRATION RATE: 15 BRPM | HEART RATE: 68 BPM | DIASTOLIC BLOOD PRESSURE: 64 MMHG | SYSTOLIC BLOOD PRESSURE: 115 MMHG

## 2021-06-24 LAB — INR PPP: 2.6

## 2021-07-06 LAB
INR PPP: 3.4
PROTHROMBIN TIME COMMENT: NORMAL

## 2021-07-09 ENCOUNTER — APPOINTMENT (OUTPATIENT)
Dept: CARDIOLOGY | Facility: CLINIC | Age: 86
End: 2021-07-09
Payer: MEDICARE

## 2021-07-09 VITALS
OXYGEN SATURATION: 98 % | SYSTOLIC BLOOD PRESSURE: 104 MMHG | DIASTOLIC BLOOD PRESSURE: 72 MMHG | RESPIRATION RATE: 15 BRPM | HEART RATE: 70 BPM

## 2021-07-09 LAB — INR PPP: 2

## 2021-07-09 PROCEDURE — 93793 ANTICOAG MGMT PT WARFARIN: CPT

## 2021-07-22 ENCOUNTER — APPOINTMENT (OUTPATIENT)
Dept: CARDIOLOGY | Facility: CLINIC | Age: 86
End: 2021-07-22
Payer: MEDICARE

## 2021-07-22 VITALS
SYSTOLIC BLOOD PRESSURE: 105 MMHG | DIASTOLIC BLOOD PRESSURE: 66 MMHG | HEART RATE: 61 BPM | RESPIRATION RATE: 16 BRPM | OXYGEN SATURATION: 95 %

## 2021-07-22 LAB — INR PPP: 1.7 RATIO

## 2021-07-22 PROCEDURE — 93793 ANTICOAG MGMT PT WARFARIN: CPT

## 2021-07-23 ENCOUNTER — APPOINTMENT (OUTPATIENT)
Dept: CARDIOLOGY | Facility: CLINIC | Age: 86
End: 2021-07-23

## 2021-07-29 ENCOUNTER — APPOINTMENT (OUTPATIENT)
Dept: CARDIOLOGY | Facility: CLINIC | Age: 86
End: 2021-07-29
Payer: MEDICARE

## 2021-07-29 LAB
INR PPP: 1.7 RATIO
PROTHROMBIN TIME COMMENT: NORMAL

## 2021-07-29 PROCEDURE — 93793 ANTICOAG MGMT PT WARFARIN: CPT

## 2021-08-06 ENCOUNTER — APPOINTMENT (OUTPATIENT)
Dept: CARDIOLOGY | Facility: CLINIC | Age: 86
End: 2021-08-06
Payer: MEDICARE

## 2021-08-06 VITALS
DIASTOLIC BLOOD PRESSURE: 58 MMHG | RESPIRATION RATE: 15 BRPM | WEIGHT: 215 LBS | HEART RATE: 65 BPM | SYSTOLIC BLOOD PRESSURE: 88 MMHG | BODY MASS INDEX: 26.18 KG/M2 | HEIGHT: 76 IN | OXYGEN SATURATION: 92 %

## 2021-08-06 LAB — INR PPP: 2.2 RATIO

## 2021-08-06 PROCEDURE — 99214 OFFICE O/P EST MOD 30 MIN: CPT

## 2021-08-06 PROCEDURE — 93000 ELECTROCARDIOGRAM COMPLETE: CPT

## 2021-08-06 RX ORDER — TRAMADOL HYDROCHLORIDE 50 MG/1
50 TABLET, COATED ORAL
Refills: 0 | Status: DISCONTINUED | COMMUNITY
End: 2021-08-06

## 2021-08-06 RX ORDER — VALSARTAN 40 MG/1
40 TABLET, COATED ORAL DAILY
Qty: 90 | Refills: 3 | Status: DISCONTINUED | COMMUNITY
Start: 2019-10-04 | End: 2021-08-06

## 2021-08-20 ENCOUNTER — NON-APPOINTMENT (OUTPATIENT)
Age: 86
End: 2021-08-20

## 2021-08-20 ENCOUNTER — APPOINTMENT (OUTPATIENT)
Dept: CARDIOLOGY | Facility: CLINIC | Age: 86
End: 2021-08-20
Payer: MEDICARE

## 2021-08-20 VITALS — DIASTOLIC BLOOD PRESSURE: 60 MMHG | OXYGEN SATURATION: 98 % | SYSTOLIC BLOOD PRESSURE: 110 MMHG | HEART RATE: 60 BPM

## 2021-08-20 LAB — INR PPP: 1.7 RATIO

## 2021-08-20 PROCEDURE — 93793 ANTICOAG MGMT PT WARFARIN: CPT

## 2021-08-27 NOTE — REVIEW OF SYSTEMS
----- Message from Ginny Espinosa sent at 7/10/2018 10:06 AM CDT -----  Contact: Patient  Type: Needs Medical Advice    Who Called:  Zayra, patient  Symptoms (please be specific):  N/A  How long has patient had these symptoms:  N/A  Pharmacy name and phone #:  N/A  Best Call Back Number: [phone]  Additional Information: Calling to talk to nurse regarding surgery scheduled for 7/20/2018. Please call her. Thanks.     [Weight Loss (___ Lbs)] : no recent weight loss [FreeTextEntry2] : Nearly 35 pounds of weight loss over the last 2 years

## 2021-08-27 NOTE — REASON FOR VISIT
[FreeTextEntry1] :  85 year old male presenting for cardiac evaluation with regard to management of problems which include:\par \par 1. Mixed ischemic and nonischemic dilated cardiomyopathy\par \par 2. Chronic atrial fibrillation\par \par 3. 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 Valvular heart disease\par \par 5. Unspecified chronic hypotension\par \par 6.  Chronic anticoagulation therapy\par \par In April he was cleared for a colonoscopy and had 15 polyps removed, biopsy results pending but has a hx of. Continues to have weight loss with a good appetite.  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  Diarrhea resolving.  Has not coming back any weight yet.\par \par \par Would like to transition back to Coumadin, Xarelto is to expensive. Does not want to apply for patient assistance program. \par \par Did have one episode of lightheadedness last night, continues to take Midodrine.\par Home blood pressure average in mid July 109/62

## 2021-08-27 NOTE — ASSESSMENT
[FreeTextEntry1] : ECG: Underlying AFIB and V paced at 65 bpm, \par \par \par Lab Data\par         20\par Chol: 144           139\par HDL:   36            36\par LDL:   83            79\par Tr           137\par Creat: 0.82\par \par Echo 20\par LVEF 30-35%\par Mod. -Sev. decreased left ventricular systolic function\par Mildly enlarged  right ventricle.\par Severe biatrial enlargement\par Mild tricuspid regurgitation \par Mild mitral regurgitation \par Mild aortic regurgitation \par Mild pulmonic regurgitation \par Mild dilation of the aortic root and ascending aorta.\par \par \par ICD interrogation \par Battery life 5.3 years remaining\par  @ 86%\par Threshold = 0.5v @ 0.50 ms\par No ventricular arrhythmias\par \par Echocardiogram 3/7/19:\par LV size enlarged with abnormal septal motion and severe hypokinesis involving the inferoposterior wall consistent with infarction. Global moderate reduction in left ventricular systolic function. Left ventricular ejection fraction 30-35%.\par Right ventricle marginal with mild RV dysfunction\par Severe biatrial enlargement.\par Moderate mitral insufficiency.\par Mild dilatation of the ascending aorta.\par Comparison with a prior echocardiogram shows a slight reduction in left ventricular size and a slight improvement in left ventricular systolic function.\par  \par \par Nuclear stress test 2018\par Moderate-sized moderate to severe fixed defect of the inferior wall inferoseptum and for apical areas consistent with infarction.\par Left ventricle dilated with severe global left ventricular dysfunction.\par No significant ischemia noted.\par Relatively unchanged in comparison to her prior study 2016.\par \par CT of the chest 2018.\par Sinus Valsalva 4 cm\par Ascending thoracic aorta 4 cm\par Descending thoracic aorta 3 cm\par \par Impression \par \par 1.  Patient with a prior history of hypotension that had been better for a couple of years, now recurring.\par      Likely related to a rather precipitous weight loss over the course of the last few months to year.\par      Lack of regular activity and exercise seems to be the only change in his daily life.  His symptoms do not seem         to be due to arrhythmia, angina or heart failure.  \par      Clinical improvement with the aforementioned medication changes which included: Stopping Valsartan                     and increasing midodrine to 10 mg p.o. 3 times daily, staggering medications\par     Treatment of an intestinal parasite has resulted in improvement in his overall state of being.\par \par 2. 2018 -  descending thoracic aorta cardiac CT is not as large as prior echo had suggested.\par \par 3. 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  There is been no interval change in the size of the left ventricle or of systolic function.\par     A repeat echocardiogram will be planned at follow-up.\par \par 4.  ICD interrogation with no arrhythmias and good threshold.,  Sensitivity and battery life\par \par 5.  Echo with slight increase  in gradient of the aortic valve;\par     LVEF unchanged and stable at this time.       \par    Mitral valve insufficiency appears to be less. \par    Aortic root measuring 3.90 cm and ascending aorta measuring 3.90 cm .\par \par 6.  GI work-up and evaluation with ongoing treatment for an intestinal parasite. Seems likely that patient will require      another colonoscopy\par \par \par \par Plan\par 1.  Valsartan  and Xarelto discontinued indefinitely.  Maintain Coumadin 6 mg daily\par     otherwise continue all other meds. \par    - Recheck INR in 2 weeks. Will have done at local lab.\par \par 2.  Contact me with regard to any further episodes of hypotension\par \par 3.  No indication for any other cardiac testing at this point in time.\par \par 4.  Monitor weight loss.\par \par 5. BW through PCP \par \par 6. There is no cardiac contraindication to a repeat colonoscopy, but it should be in a hospital environment\par \par 7.Concerns will include his tendency to low blood pressure and the impact that his preprocedural cleanse may have on that.  The need for adequate hydration pre procedurally will need to be emphasized.\par \par 8.Patient may hold aspirin and Plavix for 5 days and Coumadin  for 5 days before the procedure.\par Carvedilol to be taken with a sip of water the morning of the procedure.\par \par Please fell free to contact me with any questions\par Clinical follow up in 3 months \par \par \par \par \par

## 2021-08-27 NOTE — HISTORY OF PRESENT ILLNESS
[FreeTextEntry1] : Prior hx:\par In Feb. he was , found to be hypotensive, this had happened on several occasions leading up to this, . \par  \par Blood pressure of 90/70 was confirmed in the ER at [facility]. \par At that point we  decreased his Valsartan from 80 to 40, instructed to stagger with Carvedilol and increased his Midodrine from BID to TID. Valsartan was eventually entirely discontinued.  He remains off of it at this time\par \par No signs or symptoms of angina or heart failure at this time. \par \par

## 2021-09-03 ENCOUNTER — APPOINTMENT (OUTPATIENT)
Dept: CARDIOLOGY | Facility: CLINIC | Age: 86
End: 2021-09-03
Payer: MEDICARE

## 2021-09-03 VITALS
OXYGEN SATURATION: 98 % | HEART RATE: 64 BPM | RESPIRATION RATE: 15 BRPM | SYSTOLIC BLOOD PRESSURE: 104 MMHG | DIASTOLIC BLOOD PRESSURE: 69 MMHG

## 2021-09-03 LAB — INR PPP: 2 RATIO

## 2021-09-03 PROCEDURE — 93793 ANTICOAG MGMT PT WARFARIN: CPT

## 2021-09-17 ENCOUNTER — APPOINTMENT (OUTPATIENT)
Dept: CARDIOLOGY | Facility: CLINIC | Age: 86
End: 2021-09-17
Payer: MEDICARE

## 2021-09-17 PROCEDURE — 93793 ANTICOAG MGMT PT WARFARIN: CPT

## 2021-09-28 ENCOUNTER — NON-APPOINTMENT (OUTPATIENT)
Age: 86
End: 2021-09-28

## 2021-09-28 ENCOUNTER — APPOINTMENT (OUTPATIENT)
Dept: CARDIOLOGY | Facility: CLINIC | Age: 86
End: 2021-09-28
Payer: MEDICARE

## 2021-09-28 VITALS — HEART RATE: 61 BPM | DIASTOLIC BLOOD PRESSURE: 79 MMHG | OXYGEN SATURATION: 97 % | SYSTOLIC BLOOD PRESSURE: 118 MMHG

## 2021-09-28 LAB — INR PPP: 2.2 RATIO

## 2021-09-28 PROCEDURE — 93793 ANTICOAG MGMT PT WARFARIN: CPT

## 2021-09-28 PROCEDURE — 93306 TTE W/DOPPLER COMPLETE: CPT

## 2021-09-28 PROCEDURE — 93289 INTERROG DEVICE EVAL HEART: CPT

## 2021-10-15 ENCOUNTER — APPOINTMENT (OUTPATIENT)
Dept: CARDIOLOGY | Facility: CLINIC | Age: 86
End: 2021-10-15
Payer: MEDICARE

## 2021-10-15 VITALS
DIASTOLIC BLOOD PRESSURE: 67 MMHG | OXYGEN SATURATION: 98 % | SYSTOLIC BLOOD PRESSURE: 99 MMHG | HEART RATE: 80 BPM | RESPIRATION RATE: 16 BRPM

## 2021-10-15 LAB — INR PPP: 1.6 RATIO

## 2021-10-15 PROCEDURE — 93793 ANTICOAG MGMT PT WARFARIN: CPT

## 2021-10-29 ENCOUNTER — APPOINTMENT (OUTPATIENT)
Dept: CARDIOLOGY | Facility: CLINIC | Age: 86
End: 2021-10-29
Payer: MEDICARE

## 2021-10-29 VITALS
DIASTOLIC BLOOD PRESSURE: 65 MMHG | SYSTOLIC BLOOD PRESSURE: 115 MMHG | OXYGEN SATURATION: 95 % | RESPIRATION RATE: 16 BRPM | HEART RATE: 68 BPM

## 2021-10-29 PROCEDURE — 93793 ANTICOAG MGMT PT WARFARIN: CPT

## 2021-10-29 PROCEDURE — 85610 PROTHROMBIN TIME: CPT | Mod: QW

## 2021-10-31 LAB — INR PPP: 1.8 RATIO

## 2021-11-15 ENCOUNTER — APPOINTMENT (OUTPATIENT)
Dept: CARDIOLOGY | Facility: CLINIC | Age: 86
End: 2021-11-15
Payer: MEDICARE

## 2021-11-15 VITALS
HEART RATE: 62 BPM | BODY MASS INDEX: 25.57 KG/M2 | OXYGEN SATURATION: 98 % | HEIGHT: 76 IN | WEIGHT: 210 LBS | RESPIRATION RATE: 16 BRPM | SYSTOLIC BLOOD PRESSURE: 128 MMHG | DIASTOLIC BLOOD PRESSURE: 75 MMHG

## 2021-11-15 DIAGNOSIS — R97.20 ELEVATED PROSTATE, SPECIFIC ANTIGEN [PSA]: ICD-10-CM

## 2021-11-15 LAB — INR PPP: 2.2

## 2021-11-15 PROCEDURE — 99214 OFFICE O/P EST MOD 30 MIN: CPT

## 2021-11-15 PROCEDURE — 93000 ELECTROCARDIOGRAM COMPLETE: CPT

## 2021-11-15 NOTE — REASON FOR VISIT
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 Chronic atrial fibrillation\par \par 3. 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 Valvular heart disease\par \par 5. Unspecified chronic hypotension\par \par 6.  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  Diarrhea has stopped.  Believes that his weight has now plateaued. \par \par INR therapeutic at 2.2 \par \par Continues to take Midodrine twice daily. No recent episodes of hypotension or dizziness. \par \par Denies any CP,SOB, Palps or l/e edema.

## 2021-11-15 NOTE — HISTORY OF PRESENT ILLNESS
[FreeTextEntry1] : Prior hx:\par In Feb. he was , found to be hypotensive, this had happened on several occasions leading up to this, . \par  \par Blood pressure of 90/70 was confirmed in the ER at [facility]. \par At that point we  decreased his Valsartan from 80 to 40, instructed to stagger with Carvedilol and increased his Midodrine from BID to TID. Valsartan was eventually entirely discontinued.  He remains off of it at this time\par \par \par \par

## 2021-11-15 NOTE — ASSESSMENT
[FreeTextEntry1] : ECG: Underlying AFIB and V paced at 62  bpm, \par \par \par Lab Data\par         20\par Chol: 144           139\par HDL:   36            36\par LDL:   83            79\par Tr           137\par Creat: 0.82\par \par Echocardiogram 2021:\par Left ventricular cavity size is mildly enlarged with multiple wall motion normalities ejection fraction of 35 to 40%\par Severe biatrial enlargement\par Right ventricle enlargement with mild systolic dysfunction\par Mild aortic stenosis insufficiency\par Mildly dilated ascending aorta 4.1 cm\par Compared to prior study the LVEF is somewhat improved\par \par Echo 20\par LVEF 30-35%\par Mod. -Sev. decreased left ventricular systolic function\par Mildly enlarged  right ventricle.\par Severe biatrial enlargement\par Mild tricuspid regurgitation \par Mild mitral regurgitation \par Mild aortic regurgitation \par Mild pulmonic regurgitation \par Mild dilation of the aortic root and ascending aorta.\par \par \par ICD interrogation 2021,\par LV lead has been turned off\par Battery life 4.5 years remaining\par  @ 86%\par Threshold = 0.5v @ 0.50 ms\par 1 NSVT x8 seconds\par \par Echocardiogram 3/7/19:\par LV size enlarged with abnormal septal motion and severe hypokinesis involving the inferoposterior wall consistent with infarction. Global moderate reduction in left ventricular systolic function. Left ventricular ejection fraction 30-35%.\par Right ventricle marginal with mild RV dysfunction\par Severe biatrial enlargement.\par Moderate mitral insufficiency.\par Mild dilatation of the ascending aorta.\par Comparison with a prior echocardiogram shows a slight reduction in left ventricular size and a slight improvement in left ventricular systolic function.\par  \par \par Nuclear stress test 2018\par Moderate-sized moderate to severe fixed defect of the inferior wall inferoseptum and for apical areas consistent with infarction.\par Left ventricle dilated with severe global left ventricular dysfunction.\par No significant ischemia noted.\par Relatively unchanged in comparison to her prior study 2016.\par \par CT of the chest 2018.\par Sinus Valsalva 4 cm\par Ascending thoracic aorta 4 cm\par Descending thoracic aorta 3 cm\par \par Impression \par \par 1.  Patient with a prior history of hypotension and reoccurring dizziness now with clinical improvement   while       taking Midodrine and stopping Valsartan\par     -Cause was likely likely related to a rather precipitous weight loss over the course of the last few months to        year.\par      -  His symptoms do not seem to be due to arrhythmia, angina or heart failure.  \par      - Treatment of an intestinal parasite and micro colitis has resulted in improvement in his overall state of being.\par     -BP today WNL\par \par 2. 2018 -  descending thoracic aorta cardiac CT is not as large as prior echo had suggested.\par \par 3. 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  There is been no interval change in the size of the left ventricle but the LVEF is mildly improved\par \par 4. Sept.  ICD interrogation with 1 short run of nonsustained VT and good threshold.,  Sensitivity and battery life\par \par 5.  Echo with slight increase  in gradient of the aortic valve;\par     LVEF unchanged and stable at this time.       \par     Mitral valve insufficiency appears to be less. \par     Aortic root measuring 4.1 cm and ascending aorta measuring 4.1 cm .\par \par 6.  INR at target. \par \par 7. No coronary symptoms or signs of HF on his exam.\par \par Plan\par 1.  Recheck INR in one months \par \par 2.  Contact me with regard to any further episodes of hypotension\par \par 3.  No indication for any other cardiac testing at this point in time.\par \par 4.  Monitor weight loss.\par \par 5.  Regular ICD checks as per schedule.     \par     \par      BW through PCP \par \par 6. Continue cardiac meds Dig., Carvedilol, warfarin, Midodrine and Plavix.\par \par \par \par \par \par

## 2021-12-14 ENCOUNTER — APPOINTMENT (OUTPATIENT)
Dept: CARDIOLOGY | Facility: CLINIC | Age: 86
End: 2021-12-14
Payer: MEDICARE

## 2021-12-14 VITALS — HEIGHT: 76 IN | WEIGHT: 210 LBS | RESPIRATION RATE: 16 BRPM | BODY MASS INDEX: 25.57 KG/M2

## 2021-12-14 LAB — INR PPP: 2.1 RATIO

## 2021-12-14 PROCEDURE — 93289 INTERROG DEVICE EVAL HEART: CPT

## 2021-12-14 PROCEDURE — 93793 ANTICOAG MGMT PT WARFARIN: CPT

## 2022-01-14 ENCOUNTER — APPOINTMENT (OUTPATIENT)
Dept: CARDIOLOGY | Facility: CLINIC | Age: 87
End: 2022-01-14
Payer: MEDICARE

## 2022-01-14 VITALS — HEART RATE: 64 BPM | DIASTOLIC BLOOD PRESSURE: 82 MMHG | SYSTOLIC BLOOD PRESSURE: 119 MMHG | RESPIRATION RATE: 11 BRPM

## 2022-01-14 LAB — INR PPP: 2.2 RATIO

## 2022-01-14 PROCEDURE — 93793 ANTICOAG MGMT PT WARFARIN: CPT

## 2022-01-18 ENCOUNTER — RX RENEWAL (OUTPATIENT)
Age: 87
End: 2022-01-18

## 2022-01-18 ENCOUNTER — EMERGENCY (EMERGENCY)
Facility: HOSPITAL | Age: 87
LOS: 1 days | Discharge: HOME | End: 2022-01-18
Attending: EMERGENCY MEDICINE
Payer: MEDICARE

## 2022-01-18 VITALS
RESPIRATION RATE: 16 BRPM | OXYGEN SATURATION: 99 % | DIASTOLIC BLOOD PRESSURE: 85 MMHG | HEIGHT: 72 IN | HEART RATE: 63 BPM | SYSTOLIC BLOOD PRESSURE: 144 MMHG | WEIGHT: 210.1 LBS

## 2022-01-18 DIAGNOSIS — Z95.810 PRESENCE OF AUTOMATIC (IMPLANTABLE) CARDIAC DEFIBRILLATOR: Chronic | ICD-10-CM

## 2022-01-18 DIAGNOSIS — Z95.1 PRESENCE OF AORTOCORONARY BYPASS GRAFT: Chronic | ICD-10-CM

## 2022-01-18 DIAGNOSIS — Z98.890 OTHER SPECIFIED POSTPROCEDURAL STATES: Chronic | ICD-10-CM

## 2022-01-18 DIAGNOSIS — Z98.49 CATARACT EXTRACTION STATUS, UNSPECIFIED EYE: Chronic | ICD-10-CM

## 2022-01-18 DIAGNOSIS — Z95.5 PRESENCE OF CORONARY ANGIOPLASTY IMPLANT AND GRAFT: Chronic | ICD-10-CM

## 2022-01-18 DIAGNOSIS — Z98.89 OTHER SPECIFIED POSTPROCEDURAL STATES: Chronic | ICD-10-CM

## 2022-01-18 DIAGNOSIS — N28.0 ISCHEMIA AND INFARCTION OF KIDNEY: Chronic | ICD-10-CM

## 2022-01-18 LAB
APTT BLD: 53.6 SEC — HIGH (ref 27.5–35.5)
BASOPHILS # BLD AUTO: 0.07 K/UL — SIGNIFICANT CHANGE UP (ref 0–0.2)
BASOPHILS NFR BLD AUTO: 0.8 % — SIGNIFICANT CHANGE UP (ref 0–2)
EOSINOPHIL # BLD AUTO: 0.35 K/UL — SIGNIFICANT CHANGE UP (ref 0–0.5)
EOSINOPHIL NFR BLD AUTO: 4.2 % — SIGNIFICANT CHANGE UP (ref 0–6)
HCT VFR BLD CALC: 50.4 % — HIGH (ref 39–50)
HGB BLD-MCNC: 16.3 G/DL — SIGNIFICANT CHANGE UP (ref 13–17)
IMM GRANULOCYTES NFR BLD AUTO: 0.5 % — SIGNIFICANT CHANGE UP (ref 0–1.5)
INR BLD: 2.35 RATIO — HIGH (ref 0.88–1.16)
LYMPHOCYTES # BLD AUTO: 2.21 K/UL — SIGNIFICANT CHANGE UP (ref 1–3.3)
LYMPHOCYTES # BLD AUTO: 26.5 % — SIGNIFICANT CHANGE UP (ref 13–44)
MCHC RBC-ENTMCNC: 31.2 PG — SIGNIFICANT CHANGE UP (ref 27–34)
MCHC RBC-ENTMCNC: 32.3 GM/DL — SIGNIFICANT CHANGE UP (ref 32–36)
MCV RBC AUTO: 96.4 FL — SIGNIFICANT CHANGE UP (ref 80–100)
MONOCYTES # BLD AUTO: 0.77 K/UL — SIGNIFICANT CHANGE UP (ref 0–0.9)
MONOCYTES NFR BLD AUTO: 9.2 % — SIGNIFICANT CHANGE UP (ref 2–14)
NEUTROPHILS # BLD AUTO: 4.89 K/UL — SIGNIFICANT CHANGE UP (ref 1.8–7.4)
NEUTROPHILS NFR BLD AUTO: 58.8 % — SIGNIFICANT CHANGE UP (ref 43–77)
PLATELET # BLD AUTO: 176 K/UL — SIGNIFICANT CHANGE UP (ref 150–400)
PROTHROM AB SERPL-ACNC: 26.2 SEC — HIGH (ref 10.6–13.6)
RBC # BLD: 5.23 M/UL — SIGNIFICANT CHANGE UP (ref 4.2–5.8)
RBC # FLD: 15.9 % — HIGH (ref 10.3–14.5)
WBC # BLD: 8.33 K/UL — SIGNIFICANT CHANGE UP (ref 3.8–10.5)
WBC # FLD AUTO: 8.33 K/UL — SIGNIFICANT CHANGE UP (ref 3.8–10.5)

## 2022-01-18 PROCEDURE — 99283 EMERGENCY DEPT VISIT LOW MDM: CPT

## 2022-01-18 PROCEDURE — 99284 EMERGENCY DEPT VISIT MOD MDM: CPT | Mod: FS

## 2022-01-18 PROCEDURE — 85025 COMPLETE CBC W/AUTO DIFF WBC: CPT

## 2022-01-18 PROCEDURE — 85730 THROMBOPLASTIN TIME PARTIAL: CPT

## 2022-01-18 PROCEDURE — 36415 COLL VENOUS BLD VENIPUNCTURE: CPT

## 2022-01-18 PROCEDURE — 85610 PROTHROMBIN TIME: CPT

## 2022-01-18 NOTE — ED ADULT NURSE NOTE - NSICDXFAMILYHX_GEN_ALL_CORE_FT
FAMILY HISTORY:  Family history of breast cancer in mother  Family history of colon cancer in mother  Family history of Hodgkin's lymphoma  Family history of malignant neoplasm of ovary

## 2022-01-18 NOTE — ED ADULT NURSE NOTE - NSICDXPASTMEDICALHX_GEN_ALL_CORE_FT
PAST MEDICAL HISTORY:  Benign prostatic hyperplasia with urinary obstruction     Chronic atrial fibrillation     Coronary artery disease involving native coronary artery of native heart, angina presence unspecifie     Elevated prostate specific antigen (PSA)     Essential hypertension     HFrEF (heart failure with reduced ejection fraction)     History of ischemic cardiomyopathy     Hypothyroid     MI (myocardial infarction)     Mixed hyperlipidemia     Nonischemic cardiomyopathy     Orthostatic hypotension     Osteoarthritis

## 2022-01-18 NOTE — ED PROVIDER NOTE - ATTENDING CONTRIBUTION TO CARE
85 y/o M with A fib on Coumadin presents for bleeding to his tongue and a "blister" in the center of his tongue after licking his fingers reading the newspaper earlier today. 2 days ago he took double his dose of Coumadin accidentally. Denies pain, nausea, difficulty speaking. The "blister" was not on his tongue prior to the bleeding.    AP - NAD, well appearing, 1 cm bleb to the center of tongue with dried blood on tongue, no active bleeding, no bleeding in the posterior oropharnyx.    Will check labs, including INR, ENT follow up.

## 2022-01-18 NOTE — ED ADULT NURSE NOTE - NSICDXPASTSURGICALHX_GEN_ALL_CORE_FT
PAST SURGICAL HISTORY:  AICD (automatic cardioverter/defibrillator) present [facility] single chamber, 2015    History of cataract removal with insertion of prosthetic lens     History of coronary artery stent placement Resolute ANGELINA's in the LM, D1, and pLCX    History of inguinal hernia repair     History of needle biopsy     S/P tonsillectomy     Status post three vessel coronary artery bypass LIMA to the LAD, SVG to the Ramus, SVG to the RPDA    Torsion of renal pedicle

## 2022-01-18 NOTE — ED PROVIDER NOTE - OBJECTIVE STATEMENT
87 y/o M c/o tongue bleeding since this afternoon.  he states that bleeding started when he was licking his fingers to turn the page while reading the newspaper.  Patient takes 6mg of coumadin daily.  Last INR check was last Friday and was 2.2.  Denies bleeding from anywhere else.  Denies trauma.

## 2022-01-18 NOTE — ED PROVIDER NOTE - HIV OFFER
Where Do You Want The Question To Include Opioid Counseling Located?: Case Summary Tab Previously Declined (within the last year)

## 2022-01-18 NOTE — ED ADULT TRIAGE NOTE - CHIEF COMPLAINT QUOTE
Pt's tongue started bleeding this afternoon. Did not bite it, no cut on it that is visible with the blood. Pt takes Coumadin.

## 2022-01-21 ENCOUNTER — NON-APPOINTMENT (OUTPATIENT)
Age: 87
End: 2022-01-21

## 2022-02-11 ENCOUNTER — APPOINTMENT (OUTPATIENT)
Dept: CARDIOLOGY | Facility: CLINIC | Age: 87
End: 2022-02-11
Payer: MEDICARE

## 2022-02-11 VITALS — SYSTOLIC BLOOD PRESSURE: 125 MMHG | OXYGEN SATURATION: 96 % | DIASTOLIC BLOOD PRESSURE: 74 MMHG | HEART RATE: 79 BPM

## 2022-02-11 LAB — INR PPP: 2.7 RATIO

## 2022-02-11 PROCEDURE — 93793 ANTICOAG MGMT PT WARFARIN: CPT

## 2022-02-11 PROCEDURE — 85610 PROTHROMBIN TIME: CPT | Mod: QW

## 2022-03-02 ENCOUNTER — RX RENEWAL (OUTPATIENT)
Age: 87
End: 2022-03-02

## 2022-03-08 ENCOUNTER — APPOINTMENT (OUTPATIENT)
Dept: CARDIOLOGY | Facility: CLINIC | Age: 87
End: 2022-03-08
Payer: MEDICARE

## 2022-03-08 VITALS
DIASTOLIC BLOOD PRESSURE: 80 MMHG | WEIGHT: 216 LBS | OXYGEN SATURATION: 98 % | SYSTOLIC BLOOD PRESSURE: 110 MMHG | HEIGHT: 76 IN | BODY MASS INDEX: 26.3 KG/M2 | RESPIRATION RATE: 16 BRPM | HEART RATE: 61 BPM

## 2022-03-08 DIAGNOSIS — E03.9 HYPOTHYROIDISM, UNSPECIFIED: ICD-10-CM

## 2022-03-08 PROCEDURE — 99214 OFFICE O/P EST MOD 30 MIN: CPT

## 2022-03-08 PROCEDURE — 93000 ELECTROCARDIOGRAM COMPLETE: CPT | Mod: 59

## 2022-03-08 PROCEDURE — 93289 INTERROG DEVICE EVAL HEART: CPT

## 2022-03-08 PROCEDURE — 93000 ELECTROCARDIOGRAM COMPLETE: CPT

## 2022-03-09 LAB — INR PPP: 2.4

## 2022-03-09 NOTE — REVIEW OF SYSTEMS
[Weight Gain (___ Lbs)] : [unfilled] ~Ulb weight gain [FreeTextEntry2] : Nearly 35 pounds of weight loss over the last 2 years [Weight Loss (___ Lbs)] : no recent weight loss

## 2022-03-09 NOTE — HISTORY OF PRESENT ILLNESS
[FreeTextEntry1] : Prior hx:\par In Feb. he was , found to be hypotensive, this had happened on several occasions leading up to this, . \par  \par Blood pressure of 90/70 was confirmed in the ER at [facility]. \par At that point we  decreased his Valsartan from 80 to 40, instructed to stagger with Carvedilol and increased his Midodrine from BID to TID. Valsartan was eventually entirely discontinued.  He remains off of it at this time\par \par With improvement in his colitis, weight is increased and orthostatic hypotension has resolved\par \par \par \par

## 2022-03-09 NOTE — ASSESSMENT
[FreeTextEntry1] : ECG: Underlying AFIB and V paced at 61  bpm, \par \par \par Lab Data\par ------6/2/20-----12/1/20\par Chol---144---------139\par HDL----36-----------36\par LDL----83-----------79\par Trg----149----------137\par Creat: 0.82\par \par Echocardiogram 9/28/2021:\par Left ventricular cavity size is mildly enlarged with multiple wall motion normalities ejection fraction of 35 to 40%\par Severe biatrial enlargement\par Right ventricle enlargement with mild systolic dysfunction\par Mild aortic stenosis insufficiency\par Mildly dilated ascending aorta 4.1 cm\par Compared to prior study the LVEF is somewhat improved\par \par Echo 9/8/20\par LVEF 30-35%\par Mod. -Sev. decreased left ventricular systolic function\par Mildly enlarged  right ventricle.\par Severe biatrial enlargement\par Mild tricuspid regurgitation \par Mild mitral regurgitation \par Mild aortic regurgitation \par Mild pulmonic regurgitation \par Mild dilation of the aortic root and ascending aorta.\par \par \par ICD interrogation 9/28/2021,\par LV lead has been turned off\par Battery life 4.5 years remaining\par  @ 86%\par Threshold = 0.5v @ 0.50 ms\par 1 NSVT x8 seconds\par \par Echocardiogram 3/7/19:\par LV size enlarged with abnormal septal motion and severe hypokinesis involving the inferoposterior wall consistent with infarction. Global moderate reduction in left ventricular systolic function. Left ventricular ejection fraction 30-35%.\par Right ventricle marginal with mild RV dysfunction\par Severe biatrial enlargement.\par Moderate mitral insufficiency.\par Mild dilatation of the ascending aorta.\par Comparison with a prior echocardiogram shows a slight reduction in left ventricular size and a slight improvement in left ventricular systolic function.\par  \par \par Nuclear stress test April 26, 2018\par Moderate-sized moderate to severe fixed defect of the inferior wall inferoseptum and for apical areas consistent with infarction.\par Left ventricle dilated with severe global left ventricular dysfunction.\par No significant ischemia noted.\par Relatively unchanged in comparison to her prior study March 17, 2016.\par \par CT of the chest January 17, 2018.\par Sinus Valsalva 4 cm\par Ascending thoracic aorta 4 cm\par Descending thoracic aorta 3 cm\par \par Impression \par \par 1.  Patient with a prior history of hypotension and reoccurring dizziness now with clinical improvement   while            taking Midodrine and stopping Valsartan\par     -Cause was likely likely related to a rather precipitous weight loss over the course of the last few months to             year.\par      -  His symptoms do not seem to be due to arrhythmia, angina or heart failure.  \par      - Treatment of  colitis has resulted in improvement in his overall state of being.\par        -BP today WNL\par \par 2. 2018 -  descending thoracic aorta cardiac CT is not as large as prior echo had suggested.\par \par 3. 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  There is been no interval change in the size of the left ventricle but the LVEF is mildly improved\par \par 4. Sept.  ICD interrogation with 1 short run of nonsustained VT and good threshold.,  Sensitivity and battery life\par \par 5.  Echo with slight increase  in gradient across the aortic valve;\par     LVEF unchanged and stable at this time.       \par     Mitral valve insufficiency appears to be less. \par     Aortic root measuring 4.1 cm and ascending aorta measuring 4.1 cm .\par \par 6.  INR at target.   2.4 today\par \par 7. No coronary symptoms or signs of HF on his exam.\par \par Plan\par 1.  Recheck INR in one months \par \par 2.  Contact me with regard to any further episodes of hypotension\par \par 3.  No indication for any other cardiac testing at this point in time.\par \par 4.  Monitor weight loss.\par \par 5.  Regular ICD checks as per schedule.     \par     \par      BW through PCP \par \par 6. Continue cardiac meds Dig., Carvedilol, warfarin, Midodrine and Plavix.\par \par \par \par \par \par

## 2022-04-07 ENCOUNTER — APPOINTMENT (OUTPATIENT)
Dept: CARDIOLOGY | Facility: CLINIC | Age: 87
End: 2022-04-07

## 2022-04-08 ENCOUNTER — APPOINTMENT (OUTPATIENT)
Dept: CARDIOLOGY | Facility: CLINIC | Age: 87
End: 2022-04-08
Payer: MEDICARE

## 2022-04-08 VITALS
RESPIRATION RATE: 16 BRPM | DIASTOLIC BLOOD PRESSURE: 74 MMHG | SYSTOLIC BLOOD PRESSURE: 120 MMHG | HEART RATE: 64 BPM | OXYGEN SATURATION: 97 %

## 2022-04-08 PROCEDURE — 93793 ANTICOAG MGMT PT WARFARIN: CPT

## 2022-04-08 PROCEDURE — 85610 PROTHROMBIN TIME: CPT | Mod: QW

## 2022-04-11 LAB — INR PPP: 1.8

## 2022-04-15 ENCOUNTER — APPOINTMENT (OUTPATIENT)
Dept: CARDIOLOGY | Facility: CLINIC | Age: 87
End: 2022-04-15
Payer: MEDICARE

## 2022-04-15 VITALS — RESPIRATION RATE: 12 BRPM | DIASTOLIC BLOOD PRESSURE: 64 MMHG | SYSTOLIC BLOOD PRESSURE: 124 MMHG | HEART RATE: 64 BPM

## 2022-04-15 PROCEDURE — 93793 ANTICOAG MGMT PT WARFARIN: CPT

## 2022-05-12 ENCOUNTER — APPOINTMENT (OUTPATIENT)
Dept: CARDIOLOGY | Facility: CLINIC | Age: 87
End: 2022-05-12
Payer: MEDICARE

## 2022-05-12 VITALS
SYSTOLIC BLOOD PRESSURE: 126 MMHG | DIASTOLIC BLOOD PRESSURE: 73 MMHG | RESPIRATION RATE: 14 BRPM | HEART RATE: 69 BPM | OXYGEN SATURATION: 96 %

## 2022-05-12 PROCEDURE — 93793 ANTICOAG MGMT PT WARFARIN: CPT

## 2022-05-19 LAB
INR PPP: 2.2
INR PPP: 2.2

## 2022-06-08 ENCOUNTER — APPOINTMENT (OUTPATIENT)
Dept: CARDIOLOGY | Facility: CLINIC | Age: 87
End: 2022-06-08
Payer: MEDICARE

## 2022-06-08 VITALS
OXYGEN SATURATION: 96 % | WEIGHT: 222 LBS | SYSTOLIC BLOOD PRESSURE: 98 MMHG | RESPIRATION RATE: 16 BRPM | HEIGHT: 76 IN | BODY MASS INDEX: 27.03 KG/M2 | DIASTOLIC BLOOD PRESSURE: 60 MMHG | HEART RATE: 60 BPM

## 2022-06-08 LAB — INR PPP: 2.6

## 2022-06-08 PROCEDURE — 93000 ELECTROCARDIOGRAM COMPLETE: CPT

## 2022-06-08 PROCEDURE — 99214 OFFICE O/P EST MOD 30 MIN: CPT

## 2022-06-08 RX ORDER — BUDESONIDE 3 MG/1
3 CAPSULE, COATED PELLETS ORAL DAILY
Qty: 90 | Refills: 0 | Status: DISCONTINUED | COMMUNITY
Start: 2021-10-28 | End: 2022-06-08

## 2022-06-08 NOTE — HISTORY OF PRESENT ILLNESS
[FreeTextEntry1] : Prior hx:\par In Feb. he was , found to be hypotensive. This had happened on several  other occasions \par  \par Blood pressure of 90/70 was confirmed in the ER at [facility]. \par We  decreased his Valsartan from 80 to 40, instructed to stagger with Carvedilol and increased his Midodrine from BID to TID. Valsartan was eventually entirely discontinued.  He remains off of it at this time\par \par With improvement in his colitis, weight is increased and orthostatic hypotension has resolved. Continue to respond very well to Midodrine. \par \par \par \par

## 2022-06-08 NOTE — ASSESSMENT
[FreeTextEntry1] : ECG: Underlying AFIB and V paced at 60  bpm, \par \par Lab Data\par ------6/2/20-----12/1/20--3/24/22\par Chol---144---------139-----148\par HDL----36-----------36------42\par LDL----83-----------79------81\par Trg----149----------137----123\par Creat: 0.82\par \par Echocardiogram 9/28/2021:\par Left ventricular cavity size is mildly enlarged with multiple wall motion normalities ejection fraction of 35 to 40%\par Severe biatrial enlargement\par Right ventricle enlargement with mild systolic dysfunction\par Mild aortic stenosis insufficiency\par Mildly dilated ascending aorta 4.1 cm\par Compared to prior study the LVEF is somewhat improved\par \par Echo 9/8/20\par LVEF 30-35%\par Mod. -Sev. decreased left ventricular systolic function\par Mildly enlarged  right ventricle.\par Severe biatrial enlargement\par Mild tricuspid regurgitation \par Mild mitral regurgitation \par Mild aortic regurgitation \par Mild pulmonic regurgitation \par Mild dilation of the aortic root and ascending aorta.\par \par \par ICD interrogation 6/8/22\par 4 years left on battery\par =90%\par AF with intermittent RV pacing.\par 1 NSVT, 10 second duration in April of 2022@ 155bpm  and asymptomatic \par No shocks or attempts. \par \par \par \par ICD interrogation 6/8/2022\par LV lead has been turned off\par Battery life 4. years remaining\par  @ 90%\par Threshold = 0.5v @ 0.50 ms\par 1 NSVT x 10 seconds at 155 bpm\par \par Echocardiogram 3/7/19:\par LV size enlarged with abnormal septal motion and severe hypokinesis involving the inferoposterior wall consistent with infarction. Global moderate reduction in left ventricular systolic function. Left ventricular ejection fraction 30-35%.\par Right ventricle marginal with mild RV dysfunction\par Severe biatrial enlargement.\par Moderate mitral insufficiency.\par Mild dilatation of the ascending aorta.\par Comparison with a prior echocardiogram shows a slight reduction in left ventricular size and a slight improvement in left ventricular systolic function.\par  \par \par Nuclear stress test April 26, 2018\par Moderate-sized moderate to severe fixed defect of the inferior wall inferoseptum and for apical areas consistent with infarction.\par Left ventricle dilated with severe global left ventricular dysfunction.\par No significant ischemia noted.\par Relatively unchanged in comparison to her prior study March 17, 2016.\par \par CT of the chest January 17, 2018.\par Sinus Valsalva 4 cm\par Ascending thoracic aorta 4 cm\par Descending thoracic aorta 3 cm\par \par Impression \par \par 1.  Patient with a prior history of hypotension and reoccurring dizziness has improved with the addition of       Midodrine and stopping Valsartan\par     -Cause was likely likely related to a rather precipitous weight loss over the course of the last few months to             year.\par      -His symptoms do not seem to be due to arrhythmia, angina or heart failure.  \par      -Treatment of  colitis has resulted in improvement in his overall state of being with weight gain\par      -Recent episode likely related to rapid movement while getting out of bed in the middle of the night, never any       near syncope. .\par      -BP today reflects Carvedilol which was taken a few hours before this appt. Denies any symptoms. \par \par 2. 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  There is been no interval change in the size of the left ventricle but the LVEF is mildly improved\par \par 4.ICD interrogation with 1 short run of nonsustained VT otherwise good threshold, sensitivity and battery life.\par \par 5. Echo with slight increase  in gradient across the aortic valve;\par     LVEF unchanged and stable at this time.       \par     Mitral valve insufficiency appears to be less. \par     Aortic root measuring 4.1 cm and ascending aorta measuring 4.1 cm .\par \par 6.  INR at target\par \par 7. No coronary symptoms or signs of HF on his exam.\par \par Plan\par 1.  Recheck INR in one month \par \par 2.  Contact me with regard to any further episodes of hypotension or dizzy spells. \par \par 3.  No indication for any other cardiac testing at this point in time.\par \par 4. Exercise to the extent that he can\par \par 5.  Regular ICD checks as per schedule.    \par \par 6. Continue cardiac meds Dig., Carvedilol, warfarin, Midodrine and Plavix.\par \par 7. Full panel BW due on our prescription. \par \par \par Clinical follow up in 3 months \par \par \par \par \par

## 2022-06-08 NOTE — REASON FOR VISIT
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 Chronic atrial fibrillation\par \par 3. 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 Valvular heart disease\par \par 5. Unspecified chronic hypotension\par \par 6.  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  Diarrhea has stopped.  Believes that his weight has now plateaued. \par \par INR therapeutic at 2.6\par \par Continues to take Midodrine twice daily. No recent episodes of hypotension that he is aware of,ooc. dizzy spells when he wakes up in he middle of the night to go to the bathroom but never any near syncope or syncope. \par \par BPs at home running 120s/60s\par Today's BP reading lower than his baseline any asymptomatic. Carvedilol was taken at 10 AM, home BPs are checked at 7 PM.\par \par Denies any CP,SOB, Palps or l/e edema.\par \par ICD interrogation 6/8/22\par 4 years left on battery\par =90%\par AF with intermittent RV pacing.\par 1 NSVT, 10 second duration in April of 2022@ 155bpm  and asymptomatic \par No shocks or attempts. \par \par \par No recent BW for review\par

## 2022-07-07 ENCOUNTER — APPOINTMENT (OUTPATIENT)
Dept: CARDIOLOGY | Facility: CLINIC | Age: 87
End: 2022-07-07

## 2022-07-07 VITALS
OXYGEN SATURATION: 96 % | RESPIRATION RATE: 16 BRPM | HEART RATE: 66 BPM | SYSTOLIC BLOOD PRESSURE: 116 MMHG | DIASTOLIC BLOOD PRESSURE: 76 MMHG

## 2022-07-07 PROCEDURE — 85610 PROTHROMBIN TIME: CPT | Mod: QW

## 2022-07-07 PROCEDURE — 93793 ANTICOAG MGMT PT WARFARIN: CPT

## 2022-07-12 LAB — INR PPP: 2.7

## 2022-08-11 ENCOUNTER — APPOINTMENT (OUTPATIENT)
Dept: CARDIOLOGY | Facility: CLINIC | Age: 87
End: 2022-08-11

## 2022-08-11 VITALS
HEART RATE: 65 BPM | OXYGEN SATURATION: 95 % | SYSTOLIC BLOOD PRESSURE: 113 MMHG | DIASTOLIC BLOOD PRESSURE: 75 MMHG | RESPIRATION RATE: 16 BRPM

## 2022-08-11 PROCEDURE — 93793 ANTICOAG MGMT PT WARFARIN: CPT

## 2022-08-11 PROCEDURE — 85610 PROTHROMBIN TIME: CPT | Mod: QW

## 2022-09-07 ENCOUNTER — APPOINTMENT (OUTPATIENT)
Dept: CARDIOLOGY | Facility: CLINIC | Age: 87
End: 2022-09-07

## 2022-09-07 VITALS
SYSTOLIC BLOOD PRESSURE: 118 MMHG | DIASTOLIC BLOOD PRESSURE: 60 MMHG | OXYGEN SATURATION: 95 % | HEART RATE: 64 BPM | BODY MASS INDEX: 27.16 KG/M2 | WEIGHT: 223 LBS | HEIGHT: 76 IN | RESPIRATION RATE: 16 BRPM

## 2022-09-07 PROCEDURE — 93000 ELECTROCARDIOGRAM COMPLETE: CPT

## 2022-09-07 PROCEDURE — 93289 INTERROG DEVICE EVAL HEART: CPT

## 2022-09-07 PROCEDURE — 93000 ELECTROCARDIOGRAM COMPLETE: CPT | Mod: 59

## 2022-09-07 PROCEDURE — 99214 OFFICE O/P EST MOD 30 MIN: CPT

## 2022-09-07 NOTE — REASON FOR VISIT
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 Chronic atrial fibrillation\par \par 3. 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 Valvular heart disease\par \par 5. Unspecified chronic hypotension\par \par 6.  Chronic anticoagulation therapy\par \par April 2021 had colonoscopy with 15 polyps removed.\par \par Diagnosed with a "intestinal parasite" s\par Had another another colonoscopy for continued weight loss and diagnosed with micro colitis treated with Budesonide.  Diarrhea has stopped and  his weight has now plateaued. \par \par INR sub therapeutic at 1.7\par \par Continues to take Midodrine twice daily. No recent episodes of hypotension that he is aware of,ooc. dizzy spells when he wakes up in he middle of the night to go to the bathroom but never any near syncope or syncope. \par \par BPs at home running 120s/60s\par \par Denies any CP,SOB, Palps or l/e edema.\par \par ICD interrogation 9/722\par 3.8 years left on battery\par =93%\par AF with intermittent RV pacing.\par 1 NSVT, 11 beats @ 155bpm  and asymptomatic \par No shocks or attempts. \par \par \par No recent BW for review\par

## 2022-09-07 NOTE — HISTORY OF PRESENT ILLNESS
[FreeTextEntry1] : Prior hx:\par In Feb '21. he was , found to be hypotensive. This had happened on several  other occasions \par  \par Blood pressure of 90/70 was confirmed in the ER at [facility]. \par We  decreased his Valsartan from 80 to 40, instructed to stagger with Carvedilol and increased his Midodrine from BID to TID. Valsartan was eventually entirely discontinued.  He remains off of it at this time\par \par With improvement in his colitis, weight  increased and orthostatic hypotension has resolved. Continue to respond very well to Midodrine. \par \par \par \par

## 2022-09-07 NOTE — REVIEW OF SYSTEMS
[Weight Gain (___ Lbs)] : no recent weight gain [Weight Loss (___ Lbs)] : no recent weight loss [FreeTextEntry2] : Nearly 35 pounds of weight loss over the last 2 years

## 2022-09-07 NOTE — ASSESSMENT
[FreeTextEntry1] : ECG: Underlying AFIB and V paced at 64  bpm, \par \par Lab Data\par ------6/2/20-----12/1/20--3/24/22\par Chol---144---------139-----148\par HDL----36-----------36------42\par LDL----83-----------79------81\par Trg----149----------137----123\par Creat: 0.82\par \par Echocardiogram 9/28/2021:\par Left ventricular cavity size is mildly enlarged with multiple wall motion normalities ejection fraction of 35 to 40%\par Severe biatrial enlargement\par Right ventricle enlargement with mild systolic dysfunction\par Mild aortic stenosis insufficiency\par Mildly dilated ascending aorta 4.1 cm\par Compared to prior study the LVEF is somewhat improved\par \par Echo 9/8/20\par LVEF 30-35%\par Mod. -Sev. decreased left ventricular systolic function\par Mildly enlarged  right ventricle.\par Severe biatrial enlargement\par Mild tricuspid regurgitation \par Mild mitral regurgitation \par Mild aortic regurgitation \par Mild pulmonic regurgitation \par Mild dilation of the aortic root and ascending aorta.\par \par Echocardiogram 3/7/19:\par LV size enlarged with abnormal septal motion and severe hypokinesis involving the inferoposterior wall consistent with infarction. Global moderate reduction in left ventricular systolic function. Left ventricular ejection fraction 30-35%.\par Right ventricle marginal with mild RV dysfunction\par Severe biatrial enlargement.\par Moderate mitral insufficiency.\par Mild dilatation of the ascending aorta.\par Comparison with a prior echocardiogram shows a slight reduction in left ventricular size and a slight improvement in left ventricular systolic function.\par  \par Nuclear stress test April 26, 2018\par Moderate-sized moderate to severe fixed defect of the inferior wall inferoseptum and for apical areas consistent with infarction.\par Left ventricle dilated with severe global left ventricular dysfunction.\par No significant ischemia noted.\par Relatively unchanged in comparison to her prior study March 17, 2016.\par \par CT of the chest January 17, 2018.\par Sinus Valsalva 4 cm\par Ascending thoracic aorta 4 cm\par Descending thoracic aorta 3 cm\par \par Impression \par \par 1.  Patient with a prior history of hypotension and reoccurring dizziness has improved with the addition of             Midodrine and stopping Valsartan\par     -Cause was likely likely related to a rather precipitous weight loss due to colitis\par      -His symptoms do not seem to be due to arrhythmia, angina or heart failure.  \par      -Treatment of  colitis has resulted in improvement in his overall state of being with weight gain\par      -Recent episode likely related to rapid movement while getting out of bed in the middle of the night, never any       near syncope. .\par \par 2. 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  \par  \par \par 4.ICD interrogation with 1 short run of nonsustained VT otherwise good threshold, sensitivity and battery life.\par \par 5. Echo with slight increase  in gradient across the aortic valve;\par     LVEF unchanged and stable at this time.       \par     Mitral valve insufficiency appears to be less. \par     Aortic root measuring 4.1 cm and ascending aorta measuring 4.1 cm .\par      There is been no interval change in the size of the left ventricle but the LVEF is mildly improved\par \par 6.  INR slightly subtherapeutic.\par \par 7. No coronary symptoms or signs of HF on his exam.\par \par Plan\par 1.  Extra 3 mg of Coumadin and recheck INR in a week\par \par 2.  Noninvasive evaluation to include a pharmacologic nuclear stress.\par \par 3.  No change in medical regimen otherwise.\par      Continue cardiac meds Dig., Carvedilol, warfarin, Midodrine and Plavix.\par \par 4. Exercise to the extent that he can\par \par 5.  Regular ICD checks as per schedule.    \par \par 6. Full panel BW due on our prescription. \par \par \par Clinical follow up in 3 months \par \par \par \par \par

## 2022-10-07 ENCOUNTER — APPOINTMENT (OUTPATIENT)
Dept: CARDIOLOGY | Facility: CLINIC | Age: 87
End: 2022-10-07

## 2022-10-07 VITALS — RESPIRATION RATE: 16 BRPM | DIASTOLIC BLOOD PRESSURE: 77 MMHG | HEART RATE: 59 BPM | SYSTOLIC BLOOD PRESSURE: 137 MMHG

## 2022-10-07 LAB — INR PPP: 2

## 2022-10-07 PROCEDURE — 93793 ANTICOAG MGMT PT WARFARIN: CPT

## 2022-10-18 ENCOUNTER — RX RENEWAL (OUTPATIENT)
Age: 87
End: 2022-10-18

## 2022-10-20 ENCOUNTER — APPOINTMENT (OUTPATIENT)
Dept: CARDIOLOGY | Facility: CLINIC | Age: 87
End: 2022-10-20

## 2022-10-20 ENCOUNTER — MED ADMIN CHARGE (OUTPATIENT)
Age: 87
End: 2022-10-20

## 2022-10-20 PROCEDURE — 93015 CV STRESS TEST SUPVJ I&R: CPT

## 2022-10-20 PROCEDURE — A9500: CPT

## 2022-10-20 PROCEDURE — 78452 HT MUSCLE IMAGE SPECT MULT: CPT

## 2022-10-20 RX ADMIN — REGADENOSON 5 MG/5ML: 0.08 INJECTION, SOLUTION INTRAVENOUS at 00:00

## 2022-10-21 ENCOUNTER — APPOINTMENT (OUTPATIENT)
Dept: CARDIOLOGY | Facility: CLINIC | Age: 87
End: 2022-10-21

## 2022-10-21 PROCEDURE — 93306 TTE W/DOPPLER COMPLETE: CPT

## 2022-10-23 RX ORDER — REGADENOSON 0.08 MG/ML
0.4 INJECTION, SOLUTION INTRAVENOUS
Qty: 4 | Refills: 0 | Status: COMPLETED | OUTPATIENT
Start: 2022-10-20

## 2022-11-04 ENCOUNTER — APPOINTMENT (OUTPATIENT)
Dept: CARDIOLOGY | Facility: CLINIC | Age: 87
End: 2022-11-04

## 2022-11-04 VITALS — DIASTOLIC BLOOD PRESSURE: 72 MMHG | HEART RATE: 62 BPM | SYSTOLIC BLOOD PRESSURE: 118 MMHG | RESPIRATION RATE: 14 BRPM

## 2022-11-04 LAB — INR PPP: 2

## 2022-11-04 PROCEDURE — 93793 ANTICOAG MGMT PT WARFARIN: CPT

## 2022-11-23 ENCOUNTER — APPOINTMENT (OUTPATIENT)
Dept: CARDIOLOGY | Facility: CLINIC | Age: 87
End: 2022-11-23

## 2022-11-23 VITALS
HEART RATE: 61 BPM | SYSTOLIC BLOOD PRESSURE: 125 MMHG | DIASTOLIC BLOOD PRESSURE: 75 MMHG | OXYGEN SATURATION: 98 % | RESPIRATION RATE: 15 BRPM

## 2022-11-23 LAB — INR PPP: 1.9

## 2022-11-23 PROCEDURE — 93793 ANTICOAG MGMT PT WARFARIN: CPT

## 2022-12-21 ENCOUNTER — APPOINTMENT (OUTPATIENT)
Dept: CARDIOLOGY | Facility: CLINIC | Age: 87
End: 2022-12-21
Payer: MEDICARE

## 2022-12-21 VITALS
OXYGEN SATURATION: 97 % | RESPIRATION RATE: 16 BRPM | HEIGHT: 76 IN | HEART RATE: 63 BPM | WEIGHT: 217 LBS | SYSTOLIC BLOOD PRESSURE: 112 MMHG | BODY MASS INDEX: 26.42 KG/M2 | DIASTOLIC BLOOD PRESSURE: 72 MMHG

## 2022-12-21 LAB — INR PPP: 1.8

## 2022-12-21 PROCEDURE — 93000 ELECTROCARDIOGRAM COMPLETE: CPT

## 2022-12-21 PROCEDURE — 99214 OFFICE O/P EST MOD 30 MIN: CPT

## 2022-12-21 PROCEDURE — 93289 INTERROG DEVICE EVAL HEART: CPT

## 2022-12-21 NOTE — HISTORY OF PRESENT ILLNESS
[FreeTextEntry1] : Prior hx:\par In Feb '21. he was , found to be hypotensive. This had happened on several  other occasions \par  \par Blood pressure of 90/70 was confirmed in the ER at [facility]. \par We  decreased his Valsartan from 80 to 40, instructed to stagger with Carvedilol and increased his Midodrine from BID to TID. Valsartan was eventually entirely discontinued.  He remains off of it at this time\par \par With improvement in his colitis, weight  increased and orthostatic hypotension has resolved. Continue to respond very well to Midodrine. \par \par \par \par

## 2022-12-21 NOTE — REASON FOR VISIT
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 Chronic atrial fibrillation\par \par 3. 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 Valvular heart disease\par \par 5. Unspecified chronic hypotension\par \par 6.  Chronic anticoagulation therapy\par \par April 2021 had colonoscopy with 15 polyps removed.\par \par Diagnosed with a "intestinal parasite" s\par Had another another colonoscopy for continued weight loss and diagnosed with micro colitis treated with Budesonide.  Diarrhea has stopped and  his weight has now plateaued. \par \par today's INR sub therapeutic at 1.8\par \par Continues to take Midodrine twice daily. No recent episodes of hypotension that he is aware of,ooc. dizzy spells when he wakes up in he middle of the night to go to the bathroom but never any near syncope or syncope. \par \par BPs at home running 120s/60s\par \par Denies any CP,SOB, Palps or l/e edema.\par

## 2022-12-21 NOTE — ASSESSMENT
[FreeTextEntry1] : ECG: Underlying AFIB and V paced at 64  bpm, \par \par Lab Data\par ------6/2/20-----12/1/20--3/24/22--12/8/22\par Chol---144---------139-----148------119\par HDL----36-----------36------42--------38\par LDL----83-----------79------81--------63\par Trg----149----------137----123-------101\par Creat: 0.82\par \par Echocardiogram 10/21/2022:\par Left ventricular cavity size is mildly enlarged with multiple wall motion abnormalities and a left ventricular ejection fraction of 20 to 25%\par Severe biatrial enlargement\par Mildly reduced RV function\par Moderate MR and TR\par Mild AS and AI\par Mild dilatation of the ascending aorta  4.2 cm\par Compared with prior study there has been a further decline in the left ventricular ejection fraction.\par \par Echocardiogram 9/28/2021:\par Left ventricular cavity size is mildly enlarged with multiple wall motion normalities ejection fraction of 35 to 40%\par Severe biatrial enlargement\par Right ventricle enlargement with mild systolic dysfunction\par Mild aortic stenosis insufficiency\par Mildly dilated ascending aorta 4.1 cm\par Compared to prior study the LVEF is somewhat improved\par \par Echo 9/8/20\par LVEF 30-35%\par Mod. -Sev. decreased left ventricular systolic function\par Mildly enlarged  right ventricle.\par Severe biatrial enlargement\par Mild tricuspid regurgitation \par Mild mitral regurgitation \par Mild aortic regurgitation \par Mild pulmonic regurgitation \par Mild dilation of the aortic root and ascending aorta.\par \par Echocardiogram 3/7/19:\par LV size enlarged with abnormal septal motion and severe hypokinesis involving the inferoposterior wall consistent with infarction. Global moderate reduction in left ventricular systolic function. Left ventricular ejection fraction 30-35%.\par Right ventricle marginal with mild RV dysfunction\par Severe biatrial enlargement.\par Moderate mitral insufficiency.\par Mild dilatation of the ascending aorta.\par Comparison with a prior echocardiogram shows a slight reduction in left ventricular size and a slight improvement in left ventricular systolic function.\par  \par Pharmacologic stress 10/20/2022:\par Moderate to large fixed defect of the inferior inferoseptum and apex consistent with infarction.\par No evidence of ischemia.\par Unchanged comparison to prior study 2018.\par \par Nuclear stress test April 26, 2018\par Moderate-sized moderate to severe fixed defect of the inferior wall inferoseptum and for apical areas consistent with infarction.\par Left ventricle dilated with severe global left ventricular dysfunction.\par No significant ischemia noted.\par Relatively unchanged in comparison to her prior study March 17, 2016.\par \par CT of the chest January 17, 2018.\par Sinus Valsalva 4 cm\par Ascending thoracic aorta 4 cm\par Descending thoracic aorta 3 cm\par \par ICD interrogation 12/21/2022:\par Battery: 3.6 years\par 93% V paced\par Impedance thresholds sensitivities all good\par 2 NSVT's\par Longest 13 beats at 163 bpm 10/23/2022\par LV lead has been turned off\par \par Impression \par \par 1.  Patient with a prior history of hypotension and reoccurring dizziness has improved with the addition of                   Midodrine and stopping Valsartan\par     -Cause was likely likely related to a rather precipitous weight loss due to colitis\par      -His symptoms do not seem to be due to arrhythmia, angina or heart failure.  \par      -Treatment of  colitis has resulted in improvement in his overall state of being with weight gain\par      -Recent episode likely related to rapid movement while getting out of bed in the middle of the night, never any       near syncope. .\par \par 2. 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  Possibly relates to being off valsartan for the last 22 months\par     Recent nuclear stress test continues to show large inferior and apical infarct but no ischemia.\par \par 4.ICD interrogation with 1 short run of nonsustained VT otherwise good threshold, sensitivity and battery life.\par \par 5. Echo with slight increase  in gradient across the aortic valve;\par     LVEF has declined in comparison 2021\par     Mitral valve insufficiency appears to be less. \par     Aortic root measuring 4.2 cm and ascending aorta measuring 4.2 cm .\par      There is been no interval change in the size of the left ventricle but the LVEF is further reduced\par \par 6.  INR slightly subtherapeutic.  1.8\par \par 7. No coronary symptoms or signs of HF on his exam.\par \par Plan\par 1.  Extra 3 mg of Coumadin and recheck INR in a week\par \par 2.  Resume valsartan 40 mg p.o. daily.\par \par 3.  No change in medical regimen otherwise.\par      Continue cardiac meds Dig., Carvedilol, warfarin, Midodrine and Plavix.\par \par 4. Exercise to the extent that he can\par \par 5.  Regular ICD checks as per schedule.    \par \par 6. Full panel BW due on our prescription. \par \par 7.  Echocardiogram to be repeated at a 6-month interval.\par Clinical follow up in 3 months \par \par \par \par \par

## 2022-12-29 ENCOUNTER — APPOINTMENT (OUTPATIENT)
Dept: CARDIOLOGY | Facility: CLINIC | Age: 87
End: 2022-12-29
Payer: MEDICARE

## 2022-12-29 LAB — INR PPP: 2

## 2022-12-29 PROCEDURE — 93793 ANTICOAG MGMT PT WARFARIN: CPT

## 2022-12-29 PROCEDURE — 85610 PROTHROMBIN TIME: CPT | Mod: QW

## 2023-01-12 ENCOUNTER — APPOINTMENT (OUTPATIENT)
Dept: CARDIOLOGY | Facility: CLINIC | Age: 88
End: 2023-01-12
Payer: MEDICARE

## 2023-01-12 VITALS
OXYGEN SATURATION: 95 % | HEART RATE: 64 BPM | RESPIRATION RATE: 16 BRPM | SYSTOLIC BLOOD PRESSURE: 101 MMHG | DIASTOLIC BLOOD PRESSURE: 68 MMHG

## 2023-01-12 LAB — INR PPP: 1.9

## 2023-01-12 PROCEDURE — 85610 PROTHROMBIN TIME: CPT | Mod: QW

## 2023-01-12 PROCEDURE — 93793 ANTICOAG MGMT PT WARFARIN: CPT

## 2023-01-17 NOTE — ASU PATIENT PROFILE, ADULT - LEARNING ASSESSMENT (PATIENT) ADDITIONAL COMMENTS
baseline
presurgical, surgical scrub instructione, pain management education given - verbalized understanding

## 2023-01-27 ENCOUNTER — APPOINTMENT (OUTPATIENT)
Dept: CARDIOLOGY | Facility: CLINIC | Age: 88
End: 2023-01-27
Payer: MEDICARE

## 2023-01-27 VITALS — DIASTOLIC BLOOD PRESSURE: 70 MMHG | HEART RATE: 70 BPM | OXYGEN SATURATION: 98 % | SYSTOLIC BLOOD PRESSURE: 112 MMHG

## 2023-01-27 LAB — INR PPP: 1.7

## 2023-01-27 PROCEDURE — 93793 ANTICOAG MGMT PT WARFARIN: CPT

## 2023-02-10 ENCOUNTER — APPOINTMENT (OUTPATIENT)
Dept: CARDIOLOGY | Facility: CLINIC | Age: 88
End: 2023-02-10
Payer: MEDICARE

## 2023-02-10 VITALS — DIASTOLIC BLOOD PRESSURE: 80 MMHG | HEART RATE: 75 BPM | SYSTOLIC BLOOD PRESSURE: 134 MMHG | RESPIRATION RATE: 14 BRPM

## 2023-02-10 LAB — INR PPP: 1.6

## 2023-02-10 PROCEDURE — 93793 ANTICOAG MGMT PT WARFARIN: CPT

## 2023-02-14 ENCOUNTER — OFFICE (OUTPATIENT)
Dept: URBAN - METROPOLITAN AREA CLINIC 114 | Facility: CLINIC | Age: 88
Setting detail: OPHTHALMOLOGY
End: 2023-02-14
Payer: MEDICARE

## 2023-02-14 DIAGNOSIS — H26.493: ICD-10-CM

## 2023-02-14 DIAGNOSIS — H35.033: ICD-10-CM

## 2023-02-14 DIAGNOSIS — H43.393: ICD-10-CM

## 2023-02-14 DIAGNOSIS — H40.013: ICD-10-CM

## 2023-02-14 PROCEDURE — 92004 COMPRE OPH EXAM NEW PT 1/>: CPT | Performed by: OPHTHALMOLOGY

## 2023-02-14 PROCEDURE — 92250 FUNDUS PHOTOGRAPHY W/I&R: CPT | Performed by: OPHTHALMOLOGY

## 2023-02-14 ASSESSMENT — TONOMETRY
OS_IOP_MMHG: 14
OD_IOP_MMHG: 14

## 2023-02-14 ASSESSMENT — REFRACTION_CURRENTRX
OS_AXIS: 092
OD_CYLINDER: -0.50
OD_CYLINDER: -0.50
OD_SPHERE: +1.00
OD_VPRISM_DIRECTION: BF
OS_ADD: +2.50
OD_SPHERE: +0.50
OS_VPRISM_DIRECTION: BF
OS_SPHERE: +1.00
OD_AXIS: 107
OS_SPHERE: +0.75
OD_OVR_VA: 20/
OS_CYLINDER: -1.00
OD_ADD: +2.75
OS_ADD: +2.50
OS_CYLINDER: -1.00
OD_ADD: +2.50
OS_OVR_VA: 20/
OS_AXIS: 98
OS_OVR_VA: 20/
OD_OVR_VA: 20/
OD_AXIS: 094

## 2023-02-14 ASSESSMENT — AXIALLENGTH_DERIVED
OS_AL: 23.9177
OD_AL: 24.108
OS_AL: 23.9177
OD_AL: 24.108

## 2023-02-14 ASSESSMENT — REFRACTION_MANIFEST
OS_SPHERE: +0.75
OS_CYLINDER: -1.50
OD_VA1: 20/25-1
OS_AXIS: 080
OS_VA1: 20/20
OD_AXIS: 060
OD_CYLINDER: -1.00
OD_SPHERE: +0.50

## 2023-02-14 ASSESSMENT — VISUAL ACUITY
OD_BCVA: 20/20
OS_BCVA: 20/25-1

## 2023-02-14 ASSESSMENT — KERATOMETRY
OD_AXISANGLE_DEGREES: 151
OS_K1POWER_DIOPTERS: 42.00
OS_AXISANGLE_DEGREES: 174
OS_K2POWER_DIOPTERS: 43.25
OD_K2POWER_DIOPTERS: 42.50
OD_K1POWER_DIOPTERS: 41.75

## 2023-02-14 ASSESSMENT — SPHEQUIV_DERIVED
OD_SPHEQUIV: 0
OS_SPHEQUIV: 0
OS_SPHEQUIV: 0
OD_SPHEQUIV: 0

## 2023-02-14 ASSESSMENT — REFRACTION_AUTOREFRACTION
OD_SPHERE: +0.50
OS_SPHERE: +0.75
OS_AXIS: 082
OD_AXIS: 060
OD_CYLINDER: -1.00
OS_CYLINDER: -1.50

## 2023-02-14 ASSESSMENT — CONFRONTATIONAL VISUAL FIELD TEST (CVF)
OS_FINDINGS: FULL
OD_FINDINGS: FULL

## 2023-02-15 RX ORDER — KIT FOR THE PREPARATION OF TECHNETIUM TC99M SESTAMIBI 1 MG/5ML
INJECTION, POWDER, LYOPHILIZED, FOR SOLUTION PARENTERAL
Refills: 0 | Status: COMPLETED | OUTPATIENT
Start: 2023-02-15

## 2023-02-15 RX ADMIN — KIT FOR THE PREPARATION OF TECHNETIUM TC99M SESTAMIBI 0: 1 INJECTION, POWDER, LYOPHILIZED, FOR SOLUTION PARENTERAL at 00:00

## 2023-02-17 ENCOUNTER — APPOINTMENT (OUTPATIENT)
Dept: CARDIOLOGY | Facility: CLINIC | Age: 88
End: 2023-02-17
Payer: MEDICARE

## 2023-02-17 VITALS
OXYGEN SATURATION: 98 % | RESPIRATION RATE: 15 BRPM | DIASTOLIC BLOOD PRESSURE: 65 MMHG | HEART RATE: 62 BPM | SYSTOLIC BLOOD PRESSURE: 105 MMHG

## 2023-02-17 LAB — INR PPP: 2.1

## 2023-02-17 PROCEDURE — 93793 ANTICOAG MGMT PT WARFARIN: CPT

## 2023-03-03 ENCOUNTER — APPOINTMENT (OUTPATIENT)
Dept: CARDIOLOGY | Facility: CLINIC | Age: 88
End: 2023-03-03
Payer: MEDICARE

## 2023-03-03 VITALS — DIASTOLIC BLOOD PRESSURE: 68 MMHG | SYSTOLIC BLOOD PRESSURE: 112 MMHG | HEART RATE: 61 BPM | RESPIRATION RATE: 16 BRPM

## 2023-03-03 LAB — INR PPP: 1.9

## 2023-03-03 PROCEDURE — 93793 ANTICOAG MGMT PT WARFARIN: CPT

## 2023-03-08 ENCOUNTER — OFFICE (OUTPATIENT)
Dept: URBAN - METROPOLITAN AREA CLINIC 94 | Facility: CLINIC | Age: 88
Setting detail: OPHTHALMOLOGY
End: 2023-03-08
Payer: MEDICARE

## 2023-03-08 ENCOUNTER — ASC (OUTPATIENT)
Dept: URBAN - METROPOLITAN AREA SURGERY 8 | Facility: SURGERY | Age: 88
Setting detail: OPHTHALMOLOGY
End: 2023-03-08
Payer: MEDICARE

## 2023-03-08 DIAGNOSIS — H40.013: ICD-10-CM

## 2023-03-08 DIAGNOSIS — H26.491: ICD-10-CM

## 2023-03-08 DIAGNOSIS — H35.033: ICD-10-CM

## 2023-03-08 DIAGNOSIS — H43.393: ICD-10-CM

## 2023-03-08 PROBLEM — H26.493 POSTERIOR CAPSULAR OPACIFICATION; BOTH EYES: Status: ACTIVE | Noted: 2023-02-14

## 2023-03-08 PROBLEM — Z96.1 PSEUDOPHAKIA ; BOTH EYES: Status: ACTIVE | Noted: 2023-02-14

## 2023-03-08 PROCEDURE — 92133 CPTRZD OPH DX IMG PST SGM ON: CPT | Performed by: OPHTHALMOLOGY

## 2023-03-08 PROCEDURE — 99213 OFFICE O/P EST LOW 20 MIN: CPT | Performed by: OPHTHALMOLOGY

## 2023-03-08 PROCEDURE — 66821 AFTER CATARACT LASER SURGERY: CPT | Performed by: OPHTHALMOLOGY

## 2023-03-08 ASSESSMENT — REFRACTION_CURRENTRX
OS_AXIS: 98
OD_CYLINDER: -0.50
OD_ADD: +2.50
OS_ADD: +2.50
OD_VPRISM_DIRECTION: BF
OS_SPHERE: +1.00
OD_CYLINDER: -0.50
OD_SPHERE: +1.00
OD_OVR_VA: 20/
OS_CYLINDER: -1.00
OS_AXIS: 092
OS_SPHERE: +0.75
OS_ADD: +2.50
OD_AXIS: 094
OS_CYLINDER: -1.00
OS_VPRISM_DIRECTION: BF
OD_SPHERE: +0.50
OD_AXIS: 107
OD_OVR_VA: 20/
OS_OVR_VA: 20/
OS_OVR_VA: 20/
OD_ADD: +2.75

## 2023-03-08 ASSESSMENT — SPHEQUIV_DERIVED
OS_SPHEQUIV: -0.375
OD_SPHEQUIV: 0
OS_SPHEQUIV: 0
OD_SPHEQUIV: 0

## 2023-03-08 ASSESSMENT — REFRACTION_AUTOREFRACTION
OS_AXIS: 089
OD_AXIS: 083
OD_CYLINDER: -1.00
OS_CYLINDER: -1.75
OD_SPHERE: +0.50
OS_SPHERE: +0.50

## 2023-03-08 ASSESSMENT — CONFRONTATIONAL VISUAL FIELD TEST (CVF)
OD_FINDINGS: FULL
OS_FINDINGS: FULL

## 2023-03-08 ASSESSMENT — AXIALLENGTH_DERIVED
OD_AL: 24.0602
OD_AL: 24.0602
OS_AL: 23.8706
OS_AL: 24.0212

## 2023-03-08 ASSESSMENT — REFRACTION_MANIFEST
OD_SPHERE: +0.50
OD_CYLINDER: -1.00
OS_VA1: 20/20
OS_AXIS: 080
OD_AXIS: 060
OS_SPHERE: +0.75
OS_CYLINDER: -1.50
OD_VA1: 20/25-1

## 2023-03-08 ASSESSMENT — KERATOMETRY
OD_K2POWER_DIOPTERS: 42.75
OS_K2POWER_DIOPTERS: 43.50
OS_AXISANGLE_DEGREES: 176
OD_K1POWER_DIOPTERS: 41.75
OS_K1POWER_DIOPTERS: 42.00
OD_AXISANGLE_DEGREES: 160

## 2023-03-08 ASSESSMENT — TONOMETRY
OD_IOP_MMHG: 13
OS_IOP_MMHG: 15

## 2023-03-08 ASSESSMENT — VISUAL ACUITY
OS_BCVA: 20/25
OD_BCVA: 20/25

## 2023-03-15 ENCOUNTER — APPOINTMENT (OUTPATIENT)
Dept: CARDIOLOGY | Facility: CLINIC | Age: 88
End: 2023-03-15
Payer: MEDICARE

## 2023-03-15 ENCOUNTER — NON-APPOINTMENT (OUTPATIENT)
Age: 88
End: 2023-03-15

## 2023-03-15 VITALS
BODY MASS INDEX: 27.4 KG/M2 | DIASTOLIC BLOOD PRESSURE: 70 MMHG | RESPIRATION RATE: 16 BRPM | OXYGEN SATURATION: 95 % | HEIGHT: 76 IN | HEART RATE: 66 BPM | SYSTOLIC BLOOD PRESSURE: 118 MMHG | WEIGHT: 225 LBS

## 2023-03-15 LAB — INR PPP: 2.3

## 2023-03-15 PROCEDURE — 85610 PROTHROMBIN TIME: CPT | Mod: QW

## 2023-03-15 PROCEDURE — 93000 ELECTROCARDIOGRAM COMPLETE: CPT | Mod: 59

## 2023-03-15 PROCEDURE — 93289 INTERROG DEVICE EVAL HEART: CPT

## 2023-03-15 PROCEDURE — 99214 OFFICE O/P EST MOD 30 MIN: CPT

## 2023-03-15 NOTE — REVIEW OF SYSTEMS
[Weight Gain (___ Lbs)] : [unfilled] ~Ulb weight gain [Weight Loss (___ Lbs)] : no recent weight loss [FreeTextEntry2] : Nearly 35 pounds of weight loss over the last 2 years

## 2023-03-15 NOTE — PHYSICAL EXAM
[FreeTextEntry1] :                   Fatigued appearing and nourished with no obvious deformities or distress.\par \par Eyes: \par No conjunctival injection and no xanthelasmas.\par HEENT: \par Normocephalic.Normal oral mucosa. No pallor or cyanosis\par Neck: \par No jugular venous distension. with normal A and V wave forms. No palpable adenopathy.\par Cardiovascular: \par Normal rate and rhythm with normal S1, S2 and a grade 2/6 systolic murmur. Distal arterial pulses are normal. No significant peripheral edema.\par Pulmonary: \par Lungs are clear to auscultation and percussion. Normal respiratory pattern without any accessory muscle use\par Abdomen: \par Soft, non-tender ; no palpable organomegaly or masses.\par Extremities:\par No digital clubbing, cyanosis or ischemic changes.\par Skin: \par No skin lesions, rashes, ulcers or xanthomas.\par Psychiatric: \par Alert and oriented to person, place and time. Appropriate mood and affect.

## 2023-03-15 NOTE — ASSESSMENT
[FreeTextEntry1] : ECG: Underlying AFIB and V paced at 66  bpm, \par \par Lab Data\par ------6/2/20-----12/1/20--3/24/22--12/8/22\par Chol---144---------139-----148------119\par HDL----36-----------36------42--------38\par LDL----83-----------79------81--------63\par Trg----149----------137----123-------101\par Creat: 0.82\par \par Echocardiogram 12/29/2022\par Left ventricular enlargement with multiple wall motion abnormalities and left ventricular ejection fraction of 20 to 25%\par Biatrial enlargement with right ventricular enlargement\par Moderate MR and mild aortic stenosis and insufficiency\par Mildly dilated aorta\par \par Echocardiogram 10/21/2022:\par Left ventricular cavity size is mildly enlarged with multiple wall motion abnormalities and a left ventricular ejection fraction of 20 to 25%\par Severe biatrial enlargement\par Mildly reduced RV function\par Moderate MR and TR\par Mild AS and AI\par Mild dilatation of the ascending aorta  4.2 cm\par Compared with prior study there has been a further decline in the left ventricular ejection fraction.\par \par Echocardiogram 9/28/2021:\par Left ventricular cavity size is mildly enlarged with multiple wall motion normalities ejection fraction of 35 to 40%\par Severe biatrial enlargement\par Right ventricle enlargement with mild systolic dysfunction\par Mild aortic stenosis insufficiency\par Mildly dilated ascending aorta 4.1 cm\par Compared to prior study the LVEF is somewhat improved\par \par Echo 9/8/20\par LVEF 30-35%\par Mod. -Sev. decreased left ventricular systolic function\par Mildly enlarged  right ventricle.\par Severe biatrial enlargement\par Mild tricuspid regurgitation \par Mild mitral regurgitation \par Mild aortic regurgitation \par Mild pulmonic regurgitation \par Mild dilation of the aortic root and ascending aorta.\par \par Echocardiogram 3/7/19:\par LV size enlarged with abnormal septal motion and severe hypokinesis involving the inferoposterior wall consistent with infarction. Global moderate reduction in left ventricular systolic function. Left ventricular ejection fraction 30-35%.\par Right ventricle marginal with mild RV dysfunction\par Severe biatrial enlargement.\par Moderate mitral insufficiency.\par Mild dilatation of the ascending aorta.\par Comparison with a prior echocardiogram shows a slight reduction in left ventricular size and a slight improvement in left ventricular systolic function.\par  \par Pharmacologic stress 10/20/2022:\par Moderate to large fixed defect of the inferior inferoseptum and apex consistent with infarction.\par No evidence of ischemia.\par Unchanged comparison to prior study 2018.\par \par Nuclear stress test April 26, 2018\par Moderate-sized moderate to severe fixed defect of the inferior wall inferoseptum and for apical areas consistent with infarction.\par Left ventricle dilated with severe global left ventricular dysfunction.\par No significant ischemia noted.\par Relatively unchanged in comparison to her prior study March 17, 2016.\par \par CT of the chest January 17, 2018.\par Sinus Valsalva 4 cm\par Ascending thoracic aorta 4 cm\par Descending thoracic aorta 3 cm\par \par ICD interrogation 12/21/2022:\par Battery: 3.6 years\par 93% V paced\par Impedance thresholds sensitivities all good\par 2 NSVT's\par Longest 13 beats at 163 bpm 10/23/2022\par LV lead has been turned off\par \par Impression \par \par 1.  Patient with a prior history of hypotension and reoccurring dizziness has improved with the addition of                   Midodrine, likely likely related to a rather precipitous weight loss due to colitis\par   .  \par      -Treatment of  colitis has resulted in improvement in his overall state of being with weight gain\par      -Recent episode likely related to rapid movement while getting out of bed in the middle of the night, never any       near syncope. .\par \par 2. 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  Possibly relates to being off valsartan for the last 22 months\par     Recent nuclear stress test continues to show large inferior and apical infarct but no ischemia.\par \par 4.ICD interrogation demonstrated good threshold, sensitivity and battery life.\par \par 5. Echo with slight increase  in gradient across the aortic valve;\par     LVEF has declined in comparison 2021\par     Mitral valve insufficiency appears to be less. \par     Aortic root measuring 4.2 cm and ascending aorta measuring 4.2 cm .\par      There is been no interval change in the size of the left ventricle but the LVEF is further reduced\par \par 6.  INR previously is subtherapeutic now 2.3 on increased dose of 6\par \par 7. No coronary symptoms or signs of HF on his exam.\par \par Plan\par 1.  Continue 6 mg Coumadin check in 1 week\par \par 2.  Discontinue valsartan and begin Entresto\par \par 3.  No change in medical regimen otherwise.\par      Continue cardiac meds Dig., Carvedilol, warfarin, Midodrine and Plavix.\par \par 4. Exercise to the extent that he can\par \par 5.  Regular ICD checks as per schedule.    \par \par 6.  BMP 2 weeks\par \par 7.  Echocardiogram to be repeated at a 6-month interval.\par Clinical follow up in 3 months \par \par \par \par \par

## 2023-03-15 NOTE — REASON FOR VISIT
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 Chronic atrial fibrillation\par \par 3. 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 Valvular heart disease\par \par 5. Unspecified chronic hypotension\par \par 6.  Chronic anticoagulation therapy\par \par April 2021 had colonoscopy with 15 polyps removed.\par \par Diagnosed with a "intestinal parasite" s\par Had another another colonoscopy for continued weight loss and diagnosed with micro colitis treated with Budesonide.  Diarrhea has stopped and  his weight has been steadily increasing.\par \par Many of his INR is in the last several months have been borderline therapeutic or subtherapeutic.  He was increased to 6 mg daily and now is 2.3.\par \par Valsartan 40 mg was added last visit which he tolerates relatively well.  \par Continues to take Midodrine twice daily. No recent episodes of hypotension that he is aware of,ooc. dizzy spells when he wakes up in he middle of the night to go to the bathroom but never any near syncope or syncope. \par \par BPs at home running 120s/60s\par \par Denies any CP,SOB, Palps or l/e edema.\par

## 2023-03-15 NOTE — HISTORY OF PRESENT ILLNESS
[FreeTextEntry1] : Prior hx:\par In Feb '21. he was , found to be hypotensive. This had happened on several  other occasions \par  \par Blood pressure of 90/70 was confirmed in the ER at [facility]. \par We  decreased his Valsartan from 80 to 40, instructed to stagger with Carvedilol and increased his Midodrine from BID to TID. Valsartan was eventually entirely discontinued.  He remains off of it at this time\par \par With improvement in his colitis, weight  increased and orthostatic hypotension has resolved. Continue to respond very well to Midodrine. \par \par \par \par

## 2023-03-16 ENCOUNTER — OFFICE (OUTPATIENT)
Dept: URBAN - METROPOLITAN AREA CLINIC 94 | Facility: CLINIC | Age: 88
Setting detail: OPHTHALMOLOGY
End: 2023-03-16
Payer: MEDICARE

## 2023-03-16 DIAGNOSIS — H26.492: ICD-10-CM

## 2023-03-16 PROCEDURE — 99024 POSTOP FOLLOW-UP VISIT: CPT | Performed by: OPHTHALMOLOGY

## 2023-03-21 ASSESSMENT — KERATOMETRY
OD_AXISANGLE_DEGREES: 160
OS_AXISANGLE_DEGREES: 176
OD_K2POWER_DIOPTERS: 42.75
OS_K2POWER_DIOPTERS: 43.50
OD_K1POWER_DIOPTERS: 41.75
OS_K1POWER_DIOPTERS: 42.00

## 2023-03-21 ASSESSMENT — VISUAL ACUITY
OS_BCVA: 20/25
OD_BCVA: 20/25

## 2023-03-24 ENCOUNTER — APPOINTMENT (OUTPATIENT)
Dept: CARDIOLOGY | Facility: CLINIC | Age: 88
End: 2023-03-24
Payer: MEDICARE

## 2023-03-24 PROCEDURE — 93793 ANTICOAG MGMT PT WARFARIN: CPT

## 2023-03-31 ENCOUNTER — OFFICE (OUTPATIENT)
Dept: URBAN - METROPOLITAN AREA CLINIC 116 | Facility: CLINIC | Age: 88
Setting detail: OPHTHALMOLOGY
End: 2023-03-31
Payer: MEDICARE

## 2023-03-31 DIAGNOSIS — H26.492: ICD-10-CM

## 2023-03-31 PROBLEM — H52.4 PRESBYOPIA: Status: ACTIVE | Noted: 2023-03-31

## 2023-03-31 PROCEDURE — 99024 POSTOP FOLLOW-UP VISIT: CPT | Performed by: OPTOMETRIST

## 2023-03-31 ASSESSMENT — REFRACTION_MANIFEST
OS_ADD: +2.75
OS_CYLINDER: -1.25
OD_VA1: 20/25-1
OS_SPHERE: +0.75
OD_CYLINDER: -0.50
OD_AXIS: 094
OD_AXIS: 060
OD_VA1: 20/25
OD_SPHERE: +0.50
OS_CYLINDER: -1.50
OD_ADD: +2.75
OS_AXIS: 092
OD_CYLINDER: -1.00
OS_VA1: 20/20
OS_VA1: 20/25
OS_SPHERE: +0.75
OS_AXIS: 080
OD_SPHERE: +0.50

## 2023-03-31 ASSESSMENT — REFRACTION_CURRENTRX
OD_AXIS: 107
OS_AXIS: 100
OS_VPRISM_DIRECTION: BF
OD_SPHERE: +0.50
OD_AXIS: 094
OD_OVR_VA: 20/
OD_SPHERE: +1.00
OD_OVR_VA: 20/
OS_ADD: +2.50
OS_OVR_VA: 20/
OD_ADD: +2.50
OS_CYLINDER: -1.00
OD_AXIS: 100
OS_SPHERE: +0.75
OD_CYLINDER: -0.50
OD_CYLINDER: -0.50
OS_AXIS: 98
OS_OVR_VA: 20/
OS_AXIS: 092
OS_ADD: +2.50
OS_SPHERE: +1.00
OD_CYLINDER: -0.50
OS_ADD: +2.50
OD_OVR_VA: 20/
OS_OVR_VA: 20/
OS_SPHERE: +0.75
OS_CYLINDER: -1.00
OS_CYLINDER: -1.00
OD_VPRISM_DIRECTION: BF
OD_ADD: +2.75
OD_SPHERE: +0.50
OD_ADD: +2.50

## 2023-03-31 ASSESSMENT — REFRACTION_AUTOREFRACTION
OD_CYLINDER: -1.25
OS_AXIS: 085
OS_CYLINDER: -1.75
OS_SPHERE: +0.75
OD_SPHERE: +0.75
OD_AXIS: 090

## 2023-03-31 ASSESSMENT — CONFRONTATIONAL VISUAL FIELD TEST (CVF)
OS_FINDINGS: FULL
OD_FINDINGS: FULL

## 2023-03-31 ASSESSMENT — VISUAL ACUITY
OS_BCVA: 20/25
OD_BCVA: 20/25

## 2023-03-31 ASSESSMENT — SPHEQUIV_DERIVED
OS_SPHEQUIV: -0.125
OD_SPHEQUIV: 0
OS_SPHEQUIV: 0.125
OS_SPHEQUIV: 0
OD_SPHEQUIV: 0.25
OD_SPHEQUIV: 0.125

## 2023-03-31 ASSESSMENT — TONOMETRY: OS_IOP_MMHG: 10

## 2023-04-10 LAB — INR PPP: 2.2

## 2023-04-14 ENCOUNTER — APPOINTMENT (OUTPATIENT)
Dept: CARDIOLOGY | Facility: CLINIC | Age: 88
End: 2023-04-14
Payer: MEDICARE

## 2023-04-14 VITALS — DIASTOLIC BLOOD PRESSURE: 66 MMHG | RESPIRATION RATE: 16 BRPM | SYSTOLIC BLOOD PRESSURE: 96 MMHG | HEART RATE: 64 BPM

## 2023-04-14 LAB — INR PPP: 1.8

## 2023-04-14 PROCEDURE — 93793 ANTICOAG MGMT PT WARFARIN: CPT

## 2023-04-14 RX ORDER — SACUBITRIL AND VALSARTAN 24; 26 MG/1; MG/1
24-26 TABLET, FILM COATED ORAL TWICE DAILY
Qty: 180 | Refills: 3 | Status: DISCONTINUED | COMMUNITY
Start: 2023-03-15 | End: 2023-04-14

## 2023-04-14 ASSESSMENT — REFRACTION_MANIFEST
OS_AXIS: 080
OS_CYLINDER: -1.50
OD_CYLINDER: -1.00
OS_SPHERE: +0.75
OD_VA1: 20/25-1
OD_AXIS: 060
OS_VA1: 20/20
OD_SPHERE: +0.50

## 2023-04-14 ASSESSMENT — REFRACTION_CURRENTRX
OD_VPRISM_DIRECTION: BF
OS_VPRISM_DIRECTION: BF
OD_CYLINDER: -0.50
OS_OVR_VA: 20/
OS_SPHERE: +1.00
OS_ADD: +2.50
OS_CYLINDER: -1.00
OD_ADD: +2.75
OD_ADD: +2.50
OS_ADD: +2.50
OD_SPHERE: +1.00
OS_AXIS: 092
OD_AXIS: 107
OD_CYLINDER: -0.50
OD_OVR_VA: 20/
OD_SPHERE: +0.50
OS_SPHERE: +0.75
OD_OVR_VA: 20/
OS_CYLINDER: -1.00
OS_OVR_VA: 20/
OD_AXIS: 094
OS_AXIS: 98

## 2023-04-14 ASSESSMENT — AXIALLENGTH_DERIVED
OD_AL: 24.0602
OS_AL: 23.8706
OS_AL: 24.0212
OD_AL: 24.0602

## 2023-04-14 ASSESSMENT — REFRACTION_AUTOREFRACTION
OD_AXIS: 083
OD_CYLINDER: -1.00
OS_CYLINDER: -1.75
OD_SPHERE: +0.50
OS_AXIS: 089
OS_SPHERE: +0.50

## 2023-04-14 ASSESSMENT — SPHEQUIV_DERIVED
OD_SPHEQUIV: 0
OS_SPHEQUIV: -0.375
OS_SPHEQUIV: 0
OD_SPHEQUIV: 0

## 2023-04-14 ASSESSMENT — KERATOMETRY
OS_AXISANGLE_DEGREES: 176
OS_K2POWER_DIOPTERS: 43.50
OS_K1POWER_DIOPTERS: 42.00
OD_K2POWER_DIOPTERS: 42.75
OD_AXISANGLE_DEGREES: 160
OD_K1POWER_DIOPTERS: 41.75

## 2023-04-28 ENCOUNTER — APPOINTMENT (OUTPATIENT)
Dept: CARDIOLOGY | Facility: CLINIC | Age: 88
End: 2023-04-28
Payer: MEDICARE

## 2023-04-28 VITALS — HEART RATE: 70 BPM | SYSTOLIC BLOOD PRESSURE: 116 MMHG | DIASTOLIC BLOOD PRESSURE: 73 MMHG | OXYGEN SATURATION: 98 %

## 2023-04-28 LAB — INR PPP: 1.8

## 2023-04-28 PROCEDURE — 93793 ANTICOAG MGMT PT WARFARIN: CPT

## 2023-05-12 ENCOUNTER — APPOINTMENT (OUTPATIENT)
Dept: CARDIOLOGY | Facility: CLINIC | Age: 88
End: 2023-05-12
Payer: MEDICARE

## 2023-05-12 LAB — INR PPP: 2.2

## 2023-05-12 PROCEDURE — 93793 ANTICOAG MGMT PT WARFARIN: CPT

## 2023-05-25 ENCOUNTER — APPOINTMENT (OUTPATIENT)
Dept: CARDIOLOGY | Facility: CLINIC | Age: 88
End: 2023-05-25
Payer: MEDICARE

## 2023-05-25 VITALS
HEART RATE: 67 BPM | OXYGEN SATURATION: 96 % | DIASTOLIC BLOOD PRESSURE: 72 MMHG | RESPIRATION RATE: 16 BRPM | SYSTOLIC BLOOD PRESSURE: 106 MMHG

## 2023-05-25 LAB — INR PPP: 2.5 RATIO

## 2023-05-25 PROCEDURE — 93793 ANTICOAG MGMT PT WARFARIN: CPT

## 2023-06-28 ENCOUNTER — APPOINTMENT (OUTPATIENT)
Dept: CARDIOLOGY | Facility: CLINIC | Age: 88
End: 2023-06-28
Payer: MEDICARE

## 2023-06-28 VITALS
HEIGHT: 76 IN | OXYGEN SATURATION: 96 % | DIASTOLIC BLOOD PRESSURE: 56 MMHG | SYSTOLIC BLOOD PRESSURE: 90 MMHG | RESPIRATION RATE: 16 BRPM | BODY MASS INDEX: 25.94 KG/M2 | WEIGHT: 213 LBS | HEART RATE: 63 BPM

## 2023-06-28 PROCEDURE — 93289 INTERROG DEVICE EVAL HEART: CPT

## 2023-06-28 PROCEDURE — 99214 OFFICE O/P EST MOD 30 MIN: CPT

## 2023-06-28 PROCEDURE — 93000 ELECTROCARDIOGRAM COMPLETE: CPT | Mod: 59

## 2023-06-28 RX ORDER — SACUBITRIL AND VALSARTAN 24; 26 MG/1; MG/1
24-26 TABLET, FILM COATED ORAL TWICE DAILY
Qty: 60 | Refills: 3 | Status: DISCONTINUED | COMMUNITY
End: 2023-06-28

## 2023-06-28 RX ORDER — VALSARTAN 40 MG/1
40 TABLET, COATED ORAL DAILY
Qty: 90 | Refills: 3 | Status: DISCONTINUED | COMMUNITY
End: 2023-06-28

## 2023-06-28 NOTE — HISTORY OF PRESENT ILLNESS
[FreeTextEntry1] : Prior hx:\par In Feb '21. he was , found to be hypotensive. This had happened on several  other occasions \par  \par Blood pressure of 90/70 was confirmed in the ER at [facility]. \par We  decreased his Valsartan from 80 to 40, instructed to stagger with Carvedilol and increased his Midodrine from BID to TID. Valsartan was eventually entirely discontinued.  He remains off of it at this time\par \par With improvement in his colitis, weight  increased and orthostatic hypotension has resolved. Continue to respond very well to Midodrine. \par \par \par \par

## 2023-06-28 NOTE — ASSESSMENT
[FreeTextEntry1] : ECG: Underlying  AFIB and V paced at 63  bpm, \par \par Lab Data\par ------6/2/20-----12/1/20--3/24/22--12/8/22\par Chol---144---------139-----148------119\par HDL----36-----------36------42--------38\par LDL----83-----------79------81--------63\par Trg----149----------137----123-------101\par Creat: 0.82\par \par Echocardiogram 12/29/2022\par Left ventricular enlargement with multiple wall motion abnormalities and left ventricular ejection fraction of 20 to 25%\par Biatrial enlargement with right ventricular enlargement\par Moderate MR and mild aortic stenosis and insufficiency\par Mildly dilated aorta\par \par Echocardiogram 10/21/2022:\par Left ventricular cavity size is mildly enlarged with multiple wall motion abnormalities and a left ventricular ejection fraction of 20 to 25%\par Severe biatrial enlargement\par Mildly reduced RV function\par Moderate MR and TR\par Mild AS and AI\par Mild dilatation of the ascending aorta  4.2 cm\par Compared with prior study there has been a further decline in the left ventricular ejection fraction.\par \par Echocardiogram 9/28/2021:\par Left ventricular cavity size is mildly enlarged with multiple wall motion normalities ejection fraction of 35 to 40%\par Severe biatrial enlargement\par Right ventricle enlargement with mild systolic dysfunction\par Mild aortic stenosis insufficiency\par Mildly dilated ascending aorta 4.1 cm\par Compared to prior study the LVEF is somewhat improved\par \par Echo 9/8/20\par LVEF 30-35%\par Mod. -Sev. decreased left ventricular systolic function\par Mildly enlarged  right ventricle.\par Severe biatrial enlargement\par Mild tricuspid regurgitation \par Mild mitral regurgitation \par Mild aortic regurgitation \par Mild pulmonic regurgitation \par Mild dilation of the aortic root and ascending aorta.\par \par Echocardiogram 3/7/19:\par LV size enlarged with abnormal septal motion and severe hypokinesis involving the inferoposterior wall consistent with infarction. Global moderate reduction in left ventricular systolic function. Left ventricular ejection fraction 30-35%.\par Right ventricle marginal with mild RV dysfunction\par Severe biatrial enlargement.\par Moderate mitral insufficiency.\par Mild dilatation of the ascending aorta.\par Comparison with a prior echocardiogram shows a slight reduction in left ventricular size and a slight improvement in left ventricular systolic function.\par  \par Pharmacologic stress 10/20/2022:\par Moderate to large fixed defect of the inferior inferoseptum and apex consistent with infarction.\par No evidence of ischemia.\par Unchanged comparison to prior study 2018.\par \par Nuclear stress test April 26, 2018\par Moderate-sized moderate to severe fixed defect of the inferior wall inferoseptum and for apical areas consistent with infarction.\par Left ventricle dilated with severe global left ventricular dysfunction.\par No significant ischemia noted.\par Relatively unchanged in comparison to her prior study March 17, 2016.\par \par CT of the chest January 17, 2018.\par Sinus Valsalva 4 cm\par Ascending thoracic aorta 4 cm\par Descending thoracic aorta 3 cm\par \par ICD interrogation 6/2/8/23:\par Battery: 3.2 years\par 97% V paced\par Impedance thresholds sensitivities all good\par No arrhythmias detected\par LV lead has been turned off\par \par Impression \par \par 1.  Patient with a prior history of hypotension and reoccurring dizziness has improved with the addition of                    Midodrine, likely likely related to a rather precipitous weight loss due to colitis\par   .  \par      -Treatment of  colitis has resulted in improvement in his overall state of being with weight gain\par      Current home blood pressure 110/70.\par      Intolerant of Entresto.  Tolerating valsartan.\par \par 2. 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  Possibly relates to being off valsartan for the last 22    months, which has since been restarted.\par \par     Recent nuclear stress test continues to show large inferior and apical infarct but no ischemia.\par \par 4.ICD interrogation demonstrated good threshold, sensitivity and battery life.\par \par 5. Echo with slight increase  in gradient across the aortic valve;\par     2022LVEF has declined in comparison 2021\par     Mitral valve insufficiency appears to be less. \par     Aortic root measuring 4.2 cm and ascending aorta measuring 4.2 cm .\par      There is been no interval change in the size of the left ventricle but the LVEF is further reduced\par \par 6.  INR-therapeutic at 2.5\par \par 7. No coronary symptoms or signs of HF on his exam.\par \par Plan\par 1.  Continue 6 mg Coumadin check in 1 month\par \par 2.  Continue valsartan.  Not a good candidate for Entresto due to symptoms of hypotension\par \par 3.  No change in medical regimen otherwise.\par      Continue cardiac meds Dig., Carvedilol, warfarin, Midodrine and Plavix.\par \par 4. Exercise to the extent that he can\par \par 5.  Regular ICD checks as per schedule.  Next in 3-month    \par \par 6.   Echocardiogram to be repeated at the end of the year\par \par Clinical follow up in 6 months \par \par \par \par \par

## 2023-06-28 NOTE — REASON FOR VISIT
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 Chronic atrial fibrillation\par \par 3. 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 Valvular heart disease\par \par 5. Unspecified chronic hypotension\par \par 6.  Chronic anticoagulation therapy\par \par \par \par April 2021 had colonoscopy with 15 polyps removed.\par \par Diagnosed with a "intestinal parasite" s\par Had another another colonoscopy for continued weight loss and diagnosed with micro colitis treated with Budesonide.  Diarrhea has stopped and  his weight has been steadily increasing.\par \par Many of his INR is in the last several months have been borderline therapeutic or subtherapeutic.  He was increased to 6 mg daily and now is 2.3.\par \par Was on valsartan for afterload reduction therapy.\par 3/15/2023 tried transitioning to Entresto but this resulted in symptomatic hypotension and was discontinued.\par Back on valsartan 40 mg and tolerating well.\par Home blood pressure usually 110/70 mmHg.\par \par Denies any CP,SOB, Palps or l/e edema.\par

## 2023-07-28 ENCOUNTER — APPOINTMENT (OUTPATIENT)
Dept: CARDIOLOGY | Facility: CLINIC | Age: 88
End: 2023-07-28
Payer: MEDICARE

## 2023-07-28 VITALS — DIASTOLIC BLOOD PRESSURE: 80 MMHG | RESPIRATION RATE: 14 BRPM | SYSTOLIC BLOOD PRESSURE: 126 MMHG | HEART RATE: 66 BPM

## 2023-07-28 LAB — INR PPP: 1.9

## 2023-07-28 PROCEDURE — 93793 ANTICOAG MGMT PT WARFARIN: CPT

## 2023-08-24 ENCOUNTER — APPOINTMENT (OUTPATIENT)
Dept: CARDIOLOGY | Facility: CLINIC | Age: 88
End: 2023-08-24
Payer: MEDICARE

## 2023-08-24 LAB — INR PPP: 2.2 RATIO

## 2023-08-24 PROCEDURE — 93793 ANTICOAG MGMT PT WARFARIN: CPT

## 2023-09-12 ENCOUNTER — RX RENEWAL (OUTPATIENT)
Age: 88
End: 2023-09-12

## 2023-09-18 ENCOUNTER — NON-APPOINTMENT (OUTPATIENT)
Age: 88
End: 2023-09-18

## 2023-09-19 ENCOUNTER — TRANSCRIPTION ENCOUNTER (OUTPATIENT)
Age: 88
End: 2023-09-19

## 2023-09-20 ENCOUNTER — TRANSCRIPTION ENCOUNTER (OUTPATIENT)
Age: 88
End: 2023-09-20

## 2023-09-20 ENCOUNTER — APPOINTMENT (OUTPATIENT)
Dept: AFTER HOURS CARE | Facility: EMERGENCY ROOM | Age: 88
End: 2023-09-20
Payer: MEDICARE

## 2023-09-20 VITALS — WEIGHT: 221.79 LBS | BODY MASS INDEX: 27 KG/M2

## 2023-09-20 PROCEDURE — 99205 OFFICE O/P NEW HI 60 MIN: CPT | Mod: 95

## 2023-09-21 ENCOUNTER — APPOINTMENT (OUTPATIENT)
Dept: CARDIOLOGY | Facility: CLINIC | Age: 88
End: 2023-09-21

## 2023-09-21 ENCOUNTER — LABORATORY RESULT (OUTPATIENT)
Age: 88
End: 2023-09-21

## 2023-09-21 ENCOUNTER — APPOINTMENT (OUTPATIENT)
Dept: CARE COORDINATION | Facility: HOME HEALTH | Age: 88
End: 2023-09-21
Payer: MEDICARE

## 2023-09-21 PROCEDURE — 96365 THER/PROPH/DIAG IV INF INIT: CPT

## 2023-09-21 RX ORDER — ZOLPIDEM TARTRATE 5 MG/1
TABLET, FILM COATED ORAL
Refills: 0 | Status: ACTIVE | COMMUNITY

## 2023-09-21 RX ORDER — VALSARTAN 40 MG/1
40 TABLET, COATED ORAL
Qty: 90 | Refills: 3 | Status: COMPLETED | COMMUNITY
End: 2023-09-21

## 2023-09-22 ENCOUNTER — APPOINTMENT (OUTPATIENT)
Dept: CARE COORDINATION | Facility: HOME HEALTH | Age: 88
End: 2023-09-22
Payer: MEDICARE

## 2023-09-22 PROCEDURE — 96365 THER/PROPH/DIAG IV INF INIT: CPT

## 2023-09-23 ENCOUNTER — APPOINTMENT (OUTPATIENT)
Dept: CARE COORDINATION | Facility: HOME HEALTH | Age: 88
End: 2023-09-23
Payer: MEDICARE

## 2023-09-23 DIAGNOSIS — U07.1 COVID-19: ICD-10-CM

## 2023-09-23 PROCEDURE — 96365 THER/PROPH/DIAG IV INF INIT: CPT

## 2023-09-24 PROBLEM — U07.1 COVID-19 VIRUS INFECTION: Status: ACTIVE | Noted: 2023-09-20

## 2023-09-27 ENCOUNTER — APPOINTMENT (OUTPATIENT)
Dept: CARDIOLOGY | Facility: CLINIC | Age: 88
End: 2023-09-27
Payer: MEDICARE

## 2023-09-27 VITALS
OXYGEN SATURATION: 97 % | WEIGHT: 219 LBS | HEIGHT: 76 IN | SYSTOLIC BLOOD PRESSURE: 112 MMHG | DIASTOLIC BLOOD PRESSURE: 62 MMHG | RESPIRATION RATE: 14 BRPM | BODY MASS INDEX: 26.67 KG/M2 | HEART RATE: 60 BPM

## 2023-09-27 DIAGNOSIS — Z98.890 OTHER SPECIFIED POSTPROCEDURAL STATES: ICD-10-CM

## 2023-09-27 DIAGNOSIS — N40.1 BENIGN PROSTATIC HYPERPLASIA WITH LOWER URINARY TRACT SYMPMS: ICD-10-CM

## 2023-09-27 DIAGNOSIS — T82.118A BREAKDOWN (MECHANICAL) OF OTHER CARDIAC ELECTRONIC DEVICE, INITIAL ENCOUNTER: ICD-10-CM

## 2023-09-27 DIAGNOSIS — N13.8 BENIGN PROSTATIC HYPERPLASIA WITH LOWER URINARY TRACT SYMPMS: ICD-10-CM

## 2023-09-27 DIAGNOSIS — Z87.19 OTHER SPECIFIED POSTPROCEDURAL STATES: ICD-10-CM

## 2023-09-27 DIAGNOSIS — Z87.19 PERSONAL HISTORY OF OTHER DISEASES OF THE DIGESTIVE SYSTEM: ICD-10-CM

## 2023-09-27 PROCEDURE — 93289 INTERROG DEVICE EVAL HEART: CPT

## 2023-09-27 PROCEDURE — 99215 OFFICE O/P EST HI 40 MIN: CPT

## 2023-09-27 PROCEDURE — 93000 ELECTROCARDIOGRAM COMPLETE: CPT | Mod: 59

## 2023-09-27 RX ORDER — TAMSULOSIN HYDROCHLORIDE 0.4 MG/1
0.4 CAPSULE ORAL
Refills: 0 | Status: ACTIVE | COMMUNITY

## 2023-09-28 PROBLEM — T82.118A ICD (IMPLANTABLE CARDIOVERTER-DEFIBRILLATOR) MALFUNCTION: Status: RESOLVED | Noted: 2018-02-07 | Resolved: 2023-09-28

## 2023-09-28 PROBLEM — Z87.19 HISTORY OF COLITIS: Status: RESOLVED | Noted: 2022-03-08 | Resolved: 2023-09-28

## 2023-09-28 RX ORDER — REMDESIVIR 100 MG/1
100 INJECTION, POWDER, LYOPHILIZED, FOR SOLUTION INTRAVENOUS
Qty: 1 | Refills: 0 | Status: DISCONTINUED | COMMUNITY
Start: 2023-09-20 | End: 2023-09-28

## 2023-09-29 ENCOUNTER — APPOINTMENT (OUTPATIENT)
Dept: CARDIOLOGY | Facility: CLINIC | Age: 88
End: 2023-09-29

## 2023-10-17 ENCOUNTER — APPOINTMENT (OUTPATIENT)
Dept: OTOLARYNGOLOGY | Facility: CLINIC | Age: 88
End: 2023-10-17
Payer: MEDICARE

## 2023-10-17 VITALS
DIASTOLIC BLOOD PRESSURE: 62 MMHG | HEIGHT: 76 IN | SYSTOLIC BLOOD PRESSURE: 105 MMHG | WEIGHT: 219 LBS | BODY MASS INDEX: 26.67 KG/M2 | HEART RATE: 60 BPM

## 2023-10-17 PROCEDURE — 31575 DIAGNOSTIC LARYNGOSCOPY: CPT

## 2023-10-17 PROCEDURE — 99204 OFFICE O/P NEW MOD 45 MIN: CPT | Mod: 25

## 2023-10-26 ENCOUNTER — APPOINTMENT (OUTPATIENT)
Dept: CARDIOLOGY | Facility: CLINIC | Age: 88
End: 2023-10-26
Payer: MEDICARE

## 2023-10-26 VITALS
RESPIRATION RATE: 16 BRPM | SYSTOLIC BLOOD PRESSURE: 112 MMHG | OXYGEN SATURATION: 96 % | HEART RATE: 77 BPM | DIASTOLIC BLOOD PRESSURE: 74 MMHG

## 2023-10-26 LAB — INR PPP: 2.3 RATIO

## 2023-10-26 PROCEDURE — 93793 ANTICOAG MGMT PT WARFARIN: CPT

## 2023-11-17 ENCOUNTER — APPOINTMENT (OUTPATIENT)
Dept: CARDIOLOGY | Facility: CLINIC | Age: 88
End: 2023-11-17
Payer: MEDICARE

## 2023-11-17 VITALS — HEART RATE: 78 BPM | SYSTOLIC BLOOD PRESSURE: 112 MMHG | DIASTOLIC BLOOD PRESSURE: 58 MMHG

## 2023-11-17 LAB — INR PPP: 1.8 RATIO

## 2023-11-17 PROCEDURE — 93793 ANTICOAG MGMT PT WARFARIN: CPT

## 2023-11-29 ENCOUNTER — OFFICE (OUTPATIENT)
Dept: URBAN - METROPOLITAN AREA CLINIC 114 | Facility: CLINIC | Age: 88
Setting detail: OPHTHALMOLOGY
End: 2023-11-29
Payer: MEDICARE

## 2023-11-29 ENCOUNTER — RX ONLY (RX ONLY)
Age: 88
End: 2023-11-29

## 2023-11-29 DIAGNOSIS — H16.221: ICD-10-CM

## 2023-11-29 DIAGNOSIS — Z96.1: ICD-10-CM

## 2023-11-29 DIAGNOSIS — H02.032: ICD-10-CM

## 2023-11-29 DIAGNOSIS — H40.013: ICD-10-CM

## 2023-11-29 DIAGNOSIS — H35.033: ICD-10-CM

## 2023-11-29 DIAGNOSIS — H43.393: ICD-10-CM

## 2023-11-29 PROCEDURE — 99213 OFFICE O/P EST LOW 20 MIN: CPT | Performed by: SPECIALIST

## 2023-11-29 ASSESSMENT — REFRACTION_CURRENTRX
OS_SPHERE: +0.75
OD_OVR_VA: 20/
OS_CYLINDER: -1.25
OD_CYLINDER: -0.50
OS_ADD: +3.00
OS_OVR_VA: 20/
OS_AXIS: 95
OD_ADD: +3.00
OD_AXIS: 100
OD_SPHERE: +0.75

## 2023-11-29 ASSESSMENT — SPHEQUIV_DERIVED
OD_SPHEQUIV: 0.375
OS_SPHEQUIV: 0.25
OS_SPHEQUIV: 0.125
OD_SPHEQUIV: 0.25

## 2023-11-29 ASSESSMENT — REFRACTION_AUTOREFRACTION
OS_AXIS: 83
OS_CYLINDER: -1.50
OD_CYLINDER: -1.25
OS_SPHERE: +1.00
OD_AXIS: 89
OD_SPHERE: +1.00

## 2023-11-29 ASSESSMENT — REFRACTION_MANIFEST
OD_CYLINDER: -1.00
OS_VA2: 20/20(J1+)
OD_ADD: +2.75
OS_VA1: 20/20
OS_SPHERE: +0.75
OS_CYLINDER: -1.25
OD_SPHERE: +0.75
OD_AXIS: 90
OS_AXIS: 90
OD_VA2: 20/20(J1+)
OS_ADD: +2.75
OD_VA1: 20/30

## 2023-11-29 ASSESSMENT — LID POSITION - ENTROPION: OD_ENTROPION: RLL 2+

## 2023-11-29 ASSESSMENT — CONFRONTATIONAL VISUAL FIELD TEST (CVF)
OD_FINDINGS: FULL
OS_FINDINGS: FULL

## 2023-11-29 ASSESSMENT — SUPERFICIAL PUNCTATE KERATITIS (SPK): OD_SPK: 2+

## 2023-12-01 ENCOUNTER — APPOINTMENT (OUTPATIENT)
Dept: CARDIOLOGY | Facility: CLINIC | Age: 88
End: 2023-12-01
Payer: MEDICARE

## 2023-12-01 VITALS
HEART RATE: 76 BPM | OXYGEN SATURATION: 97 % | RESPIRATION RATE: 16 BRPM | SYSTOLIC BLOOD PRESSURE: 110 MMHG | HEIGHT: 76 IN | DIASTOLIC BLOOD PRESSURE: 70 MMHG

## 2023-12-01 LAB — INR PPP: 2 RATIO

## 2023-12-01 PROCEDURE — 93793 ANTICOAG MGMT PT WARFARIN: CPT

## 2023-12-08 ENCOUNTER — APPOINTMENT (OUTPATIENT)
Dept: CARDIOLOGY | Facility: CLINIC | Age: 88
End: 2023-12-08
Payer: MEDICARE

## 2023-12-08 VITALS — SYSTOLIC BLOOD PRESSURE: 125 MMHG | DIASTOLIC BLOOD PRESSURE: 77 MMHG | HEART RATE: 60 BPM

## 2023-12-08 LAB — INR PPP: 1.9 RATIO

## 2023-12-08 PROCEDURE — 93793 ANTICOAG MGMT PT WARFARIN: CPT

## 2023-12-15 ENCOUNTER — APPOINTMENT (OUTPATIENT)
Dept: CARDIOLOGY | Facility: CLINIC | Age: 88
End: 2023-12-15
Payer: MEDICARE

## 2023-12-15 VITALS
DIASTOLIC BLOOD PRESSURE: 67 MMHG | SYSTOLIC BLOOD PRESSURE: 100 MMHG | HEART RATE: 60 BPM | RESPIRATION RATE: 16 BRPM | HEIGHT: 76 IN

## 2023-12-15 LAB — INR PPP: 2.2 RATIO

## 2023-12-15 PROCEDURE — 93793 ANTICOAG MGMT PT WARFARIN: CPT

## 2023-12-20 ENCOUNTER — OFFICE (OUTPATIENT)
Dept: URBAN - METROPOLITAN AREA CLINIC 104 | Facility: CLINIC | Age: 88
Setting detail: OPHTHALMOLOGY
End: 2023-12-20
Payer: MEDICARE

## 2023-12-20 DIAGNOSIS — H02.132: ICD-10-CM

## 2023-12-20 DIAGNOSIS — H02.012: ICD-10-CM

## 2023-12-20 PROCEDURE — 92285 EXTERNAL OCULAR PHOTOGRAPHY: CPT | Performed by: OPHTHALMOLOGY

## 2023-12-20 PROCEDURE — 99214 OFFICE O/P EST MOD 30 MIN: CPT | Performed by: OPHTHALMOLOGY

## 2023-12-20 ASSESSMENT — LID POSITION - ECTROPION: OD_ECTROPION: RLL 2+

## 2023-12-20 ASSESSMENT — REFRACTION_AUTOREFRACTION
OD_CYLINDER: -1.25
OS_CYLINDER: -1.50
OD_SPHERE: +1.00
OD_AXIS: 89
OS_SPHERE: +1.00
OS_AXIS: 83

## 2023-12-20 ASSESSMENT — SPHEQUIV_DERIVED
OS_SPHEQUIV: 0.25
OD_SPHEQUIV: 0.375

## 2023-12-20 ASSESSMENT — LID POSITION - ENTROPION: OD_ENTROPION: RLL 2+

## 2023-12-20 ASSESSMENT — CONFRONTATIONAL VISUAL FIELD TEST (CVF)
OS_FINDINGS: FULL
OD_FINDINGS: FULL

## 2023-12-20 ASSESSMENT — LID EXAM ASSESSMENTS: OD_TRICHIASIS: RLL 2+

## 2023-12-20 ASSESSMENT — SUPERFICIAL PUNCTATE KERATITIS (SPK): OD_SPK: 2+

## 2023-12-29 ENCOUNTER — APPOINTMENT (OUTPATIENT)
Dept: CARDIOLOGY | Facility: CLINIC | Age: 88
End: 2023-12-29
Payer: MEDICARE

## 2023-12-29 VITALS — HEART RATE: 64 BPM | SYSTOLIC BLOOD PRESSURE: 108 MMHG | DIASTOLIC BLOOD PRESSURE: 71 MMHG

## 2023-12-29 LAB — INR PPP: 2.1 RATIO

## 2023-12-29 PROCEDURE — 93793 ANTICOAG MGMT PT WARFARIN: CPT

## 2024-01-04 ENCOUNTER — RX RENEWAL (OUTPATIENT)
Age: 89
End: 2024-01-04

## 2024-01-04 RX ORDER — CLOPIDOGREL BISULFATE 75 MG/1
75 TABLET, FILM COATED ORAL
Qty: 90 | Refills: 3 | Status: ACTIVE | COMMUNITY
Start: 2018-08-23 | End: 1900-01-01

## 2024-01-04 RX ORDER — DIGOXIN 250 UG/1
250 TABLET ORAL
Qty: 90 | Refills: 3 | Status: ACTIVE | COMMUNITY
Start: 2020-12-21 | End: 1900-01-01

## 2024-01-11 ENCOUNTER — APPOINTMENT (OUTPATIENT)
Dept: CARDIOLOGY | Facility: CLINIC | Age: 89
End: 2024-01-11
Payer: MEDICARE

## 2024-01-11 LAB — INR PPP: 1.6 RATIO

## 2024-01-11 PROCEDURE — 93306 TTE W/DOPPLER COMPLETE: CPT

## 2024-01-11 PROCEDURE — 93289 INTERROG DEVICE EVAL HEART: CPT

## 2024-01-11 PROCEDURE — 93793 ANTICOAG MGMT PT WARFARIN: CPT

## 2024-01-11 RX ORDER — PERFLUTREN 6.52 MG/ML
6.52 INJECTION, SUSPENSION INTRAVENOUS
Qty: 2 | Refills: 0 | Status: COMPLETED | OUTPATIENT
Start: 2024-01-11

## 2024-01-12 ENCOUNTER — APPOINTMENT (OUTPATIENT)
Dept: CARDIOLOGY | Facility: CLINIC | Age: 89
End: 2024-01-12
Payer: MEDICARE

## 2024-01-12 VITALS
WEIGHT: 220 LBS | DIASTOLIC BLOOD PRESSURE: 68 MMHG | RESPIRATION RATE: 13 BRPM | BODY MASS INDEX: 26.79 KG/M2 | HEART RATE: 59 BPM | HEIGHT: 76 IN | SYSTOLIC BLOOD PRESSURE: 126 MMHG

## 2024-01-12 DIAGNOSIS — Z01.810 ENCOUNTER FOR PREPROCEDURAL CARDIOVASCULAR EXAMINATION: ICD-10-CM

## 2024-01-12 DIAGNOSIS — Z95.810 PRESENCE OF AUTOMATIC (IMPLANTABLE) CARDIAC DEFIBRILLATOR: ICD-10-CM

## 2024-01-12 DIAGNOSIS — I95.89 OTHER HYPOTENSION: ICD-10-CM

## 2024-01-12 DIAGNOSIS — I42.9 CARDIOMYOPATHY, UNSPECIFIED: ICD-10-CM

## 2024-01-12 PROCEDURE — 99215 OFFICE O/P EST HI 40 MIN: CPT

## 2024-01-12 PROCEDURE — 93000 ELECTROCARDIOGRAM COMPLETE: CPT

## 2024-01-12 NOTE — PHYSICAL EXAM
[FreeTextEntry1] :           Well appearing and nourished with no obvious deformities or distress.  Eyes:  mild swelling inferior to his right eye.  No conjunctival injection and no xanthelasmas. HEENT:  Normocephalic.Normal oral mucosa. No pallor or cyanosis Neck:  No jugular venous distension. with normal A and V wave forms. No palpable adenopathy. Cardiovascular:  Normal rate and rhythm with normal S1, S2 and a grade 2/6 systolic murmur. Distal arterial pulses are normal. No significant peripheral edema. Pulmonary:  Lungs are clear to auscultation and percussion. Normal respiratory pattern without any accessory muscle use Abdomen:  Soft, non-tender ; no palpable organomegaly or masses. Extremities: No digital clubbing, cyanosis or ischemic changes. Skin:  No skin lesions, rashes, ulcers or xanthomas. Psychiatric:  Alert and oriented to person, place and time. Appropriate mood and affect.

## 2024-01-12 NOTE — HISTORY OF PRESENT ILLNESS
[FreeTextEntry1] :   Since last visit he has been diagnosed with prostate cancer, completed radiation therapy.  He also lost his wife of many years to widely metastatic adenocarcinoma.  Prior hx: In Feb '21.- hypotensive  (1 of several episodes) Blood pressure of 90/70 was confirmed in the ER at [facility].  We  decreased his Valsartan from 80 to 40, instructed to stagger with Carvedilol and increased his Midodrine from BID to TID.   Valsartan was discontinued for a time.  Entresto was tried but even at half dose, hypotension precluded continuing it. We resumed valsartan at 40 mg daily (June 2023)which he has tolerated.  With improvement in his colitis, weight  increased and orthostatic hypotension  improved. Continue to respond very well to Midodrine.

## 2024-01-12 NOTE — ASSESSMENT
[FreeTextEntry1] :  ECG: Underlying AFIB and V paced at 59bpm,  Lab Data ------6/2/20-----12/1/20--3/24/22--12/8/22--9/7/23 Chol---144---------139-----148------119--------134 HDL----36-----------36------42--------38---------39 LDL----83-----------79------81--------63---------72 Trg----149----------137----123-------101-------146 Creat: 0.8  Echocardiogram 1/11/23 Dilated left ventricle with evidence of ischemic heart disease and severely reduced ejection fraction approximately 25% Mitral valve thickening with moderate to severe MR Moderate-severe TR Moderate aortic valve stenosis Dilated ascending aorta  Echocardiogram 12/29/2022 Left ventricular enlargement with multiple wall motion abnormalities and left ventricular ejection fraction of 20 to 25% Biatrial enlargement with right ventricular enlargement Moderate MR and mild aortic stenosis and insufficiency Mildly dilated aorta  Echocardiogram 10/21/2022: Left ventricular cavity size is mildly enlarged with multiple wall motion abnormalities and a left ventricular ejection fraction of 20 to 25% Severe biatrial enlargement Mildly reduced RV function Moderate MR and TR Mild AS and AI Mild dilatation of the ascending aorta 4.2 cm Compared with prior study there has been a further decline in the left ventricular ejection fraction.  Echocardiogram 9/28/2021: Left ventricular cavity size is mildly enlarged with multiple wall motion normalities ejection fraction of 35 to 40% Severe biatrial enlargement Right ventricle enlargement with mild systolic dysfunction Mild aortic stenosis insufficiency Mildly dilated ascending aorta 4.1 cm Compared to prior study the LVEF is somewhat improved  Echo 9/8/20 LVEF 30-35% Mod. -Sev. decreased left ventricular systolic function Mildly enlarged right ventricle. Severe biatrial enlargement Mild tricuspid regurgitation Mild mitral regurgitation Mild aortic regurgitation Mild pulmonic regurgitation Mild dilation of the aortic root and ascending aorta.  Echocardiogram 3/7/19: LV size enlarged with abnormal septal motion and severe hypokinesis involving the inferoposterior wall consistent with infarction. Global moderate reduction in left ventricular systolic function. Left ventricular ejection fraction 30-35%. Right ventricle marginal with mild RV dysfunction Severe biatrial enlargement. Moderate mitral insufficiency. Mild dilatation of the ascending aorta. Comparison with a prior echocardiogram shows a slight reduction in left ventricular size and a slight improvement in left ventricular systolic function.  Pharmacologic stress 10/20/2022: Moderate to large fixed defect of the inferior inferoseptum and apex consistent with infarction. No evidence of ischemia. Unchanged comparison to prior study 2018.  Nuclear stress test April 26, 2018 Moderate-sized moderate to severe fixed defect of the inferior wall inferoseptum and for apical areas consistent with infarction. Left ventricle dilated with severe global left ventricular dysfunction. No significant ischemia noted. Relatively unchanged in comparison to her prior study March 17, 2016.  CT of the chest January 17, 2018. Sinus Valsalva 4 cm Ascending thoracic aorta 4 cm Descending thoracic aorta 3 cm  ICD interrogation 9/27/23: Battery: 3.2 years 93% V paced Impedance thresholds sensitivities all good No arrhythmias detected LV lead has been turned off  Impression  1.   Preop for right eye surgery as described above.  Per my understanding and direct communication with ophthalmology, the procedure is going to be performed under IV conscious sedation. . 2. 2018 - descending thoracic aorta cardiac CT is not as large as prior echo had suggested.  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   Repeat echocardiography shows that this is a stable jet fraction at this time.  Recent nuclear stress test continues to show large inferior and apical infarct but no ischemia.  4.ICD interrogation demonstrated good threshold, sensitivity and battery life.  5. Echo with an increased gradient across the aortic valve now c/w moderate AS.      Mitral valve insufficiency remains mod- severe     Aortic root measuring 4.2 cm and ascending aorta measuring 4.2 cm.     There's been no interval change in the size of the left ventricle .  6. INR-  1.6 yesterday - addressed yesterday  7. No coronary symptoms or signs of HF on his exam.  Plan 1. Patient has tolerated several procedures performed with IV conscious sedation      and there is no cardiac contraindication to his proceeding with this.      Instructed patient to hold clopidogrel and warfarin 5 days before the procedure.  Other cardiac meds taken as       per regular scheduling.  2. Continue valsartan. Not a good candidate for Entresto due to symptoms of hypotension  3. No change in medical regimen otherwise.  Continue cardiac meds Dig., Carvedilol, warfarin, Midodrine and Plavix.  4. Exercise to the extent that he can  5. Regular ICD checks as per schedule. Next in 3-month  6  Clinical follow up in 6 months.

## 2024-01-12 NOTE — REASON FOR VISIT
[FreeTextEntry1] :  88 year old male presenting for cardiac evaluation in anticipation of surgery on his right eye --  ectropion canthoplasty   His cardiac history includes: 1. Mixed ischemic and nonischemic dilated cardiomyopathy  2. Chronic atrial fibrillation  3. 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  4. Valvular heart disease  5. Unspecified chronic hypotension  6.  Chronic anticoagulation therapy  April 2021 had colonoscopy with 15 polyps removed.  Diagnosed with a "intestinal parasite" s Had another another colonoscopy for continued weight loss and diagnosed with micro colitis treated with Budesonide.  Diarrhea has stopped and  his weight has been steadily increasing.  Was on valsartan for afterload reduction therapy. 3/15/2023 tried transitioning to Entresto but this resulted in symptomatic hypotension and was discontinued. Back on valsartan 40 mg and tolerating well. Home blood pressure usually 110/70 mmHg.  Denies any CP,SOB, Palps or l/e edema.

## 2024-01-17 ENCOUNTER — APPOINTMENT (OUTPATIENT)
Dept: OTOLARYNGOLOGY | Facility: CLINIC | Age: 89
End: 2024-01-17
Payer: MEDICARE

## 2024-01-17 VITALS
HEIGHT: 76 IN | HEART RATE: 62 BPM | DIASTOLIC BLOOD PRESSURE: 73 MMHG | SYSTOLIC BLOOD PRESSURE: 120 MMHG | WEIGHT: 220 LBS | BODY MASS INDEX: 26.79 KG/M2

## 2024-01-17 DIAGNOSIS — D49.0 NEOPLASM OF UNSPECIFIED BEHAVIOR OF DIGESTIVE SYSTEM: ICD-10-CM

## 2024-01-17 PROCEDURE — 31575 DIAGNOSTIC LARYNGOSCOPY: CPT

## 2024-01-17 PROCEDURE — 99213 OFFICE O/P EST LOW 20 MIN: CPT | Mod: 25

## 2024-01-17 NOTE — HISTORY OF PRESENT ILLNESS
[de-identified] : Update 1/17/2024: 88 year old male here for follow up for calcifications on PET/CT followed by findings of a BOT lesion on CT neck. Pt states he is feeling well, denies dysphagia, throat pain, voice changes or SOB.   88M with PMHx prostate cancer presents for an evaluation for incidental finding of calcifications on PET/CT followed by findings of a BOT lesion on CT neck. Dr. Herrmann from [facility] found BOT lesion 2 years ago and had benign biopsy results. Pt reports that current CT shows that lesion is now twice the size. Patient with recent cough, reports having Covid 3 weeks ago. Patient denies dysphagia, odynophagia, dyspnea, dysphonia, cough, globus, or otalgia. Patient smokes 1-1.5 cigars daily for many years. Occasional alcohol use.

## 2024-01-17 NOTE — PHYSICAL EXAM
[de-identified] : Wears hearing aids bilaterally [Midline] : trachea located in midline position [Laryngoscopy Performed] : laryngoscopy was performed, see procedure section for findings [de-identified] : No palpable BOT lesion [Normal] : no rashes

## 2024-01-17 NOTE — CONSULT LETTER
[Dear  ___] : Dear  [unfilled], [Courtesy Letter:] : I had the pleasure of seeing your patient, [unfilled], in my office today. [Please see my note below.] : Please see my note below. [Sincerely,] : Sincerely, [FreeTextEntry2] : Adrian Lombardi, MD [FreeTextEntry3] : Samm Joel MD, FACS Chief of Otolaryngology at [facility]  Dept. of Otolaryngology [facility]

## 2024-01-17 NOTE — PROCEDURE
[Unable to Cooperate with Mirror] : patient unable to cooperate with mirror [Lesion] : lesion identified by mirror examination needing further evaluation [None] : none [Flexible Endoscope] : examined with the flexible endoscope [Serial Number: ___] : Serial Number: [unfilled] [True Vocal Cords Paralysis] : no true vocal cord paralysis [True Vocal Cords Erythematous] : no true vocal cord edema [True Vocal Cords Can's Nodules] : no true vocal cord nodules [Glottis Arytenoid Cartilages] : no arytenoid granulomas [Glottis Arytenoid Cartilages Erythema] : no arytenoid erythema [Normal] : posterior cricoid area had healthy pink mucosa in the interarytenoid area and the esophageal inlet [de-identified] :  Surgilube [de-identified] : Prominent lingual tonsils.  No distinct mass.

## 2024-01-17 NOTE — ASSESSMENT
[FreeTextEntry1] : Pt once again with no evidence of a BOT mass.  Pt to f/u in 6 months for a recheck.

## 2024-01-19 ENCOUNTER — APPOINTMENT (OUTPATIENT)
Dept: CARDIOLOGY | Facility: CLINIC | Age: 89
End: 2024-01-19
Payer: MEDICARE

## 2024-01-19 VITALS — DIASTOLIC BLOOD PRESSURE: 76 MMHG | HEART RATE: 66 BPM | SYSTOLIC BLOOD PRESSURE: 117 MMHG

## 2024-01-19 LAB — INR PPP: 2.1 RATIO

## 2024-01-19 PROCEDURE — 93793 ANTICOAG MGMT PT WARFARIN: CPT

## 2024-01-29 RX ORDER — MIDODRINE HYDROCHLORIDE 10 MG/1
10 TABLET ORAL
Qty: 180 | Refills: 1 | Status: ACTIVE | COMMUNITY
Start: 2018-02-12 | End: 1900-01-01

## 2024-02-01 ENCOUNTER — APPOINTMENT (OUTPATIENT)
Dept: CARDIOLOGY | Facility: CLINIC | Age: 89
End: 2024-02-01
Payer: MEDICARE

## 2024-02-01 VITALS
DIASTOLIC BLOOD PRESSURE: 60 MMHG | SYSTOLIC BLOOD PRESSURE: 98 MMHG | HEART RATE: 63 BPM | RESPIRATION RATE: 16 BRPM | OXYGEN SATURATION: 95 %

## 2024-02-01 LAB — INR PPP: 1.6 RATIO

## 2024-02-01 PROCEDURE — 93793 ANTICOAG MGMT PT WARFARIN: CPT

## 2024-02-07 ENCOUNTER — OFFICE (OUTPATIENT)
Dept: URBAN - METROPOLITAN AREA CLINIC 104 | Facility: CLINIC | Age: 89
Setting detail: OPHTHALMOLOGY
End: 2024-02-07
Payer: MEDICARE

## 2024-02-07 ENCOUNTER — RX ONLY (RX ONLY)
Age: 89
End: 2024-02-07

## 2024-02-07 DIAGNOSIS — H02.132: ICD-10-CM

## 2024-02-07 DIAGNOSIS — H16.221: ICD-10-CM

## 2024-02-07 DIAGNOSIS — H02.012: ICD-10-CM

## 2024-02-07 PROCEDURE — 99213 OFFICE O/P EST LOW 20 MIN: CPT | Performed by: OPHTHALMOLOGY

## 2024-02-07 ASSESSMENT — SUPERFICIAL PUNCTATE KERATITIS (SPK)
OS_SPK: 3+
OD_SPK: T

## 2024-02-07 ASSESSMENT — CONFRONTATIONAL VISUAL FIELD TEST (CVF)
OS_FINDINGS: FULL
OD_FINDINGS: FULL

## 2024-02-07 ASSESSMENT — LID EXAM ASSESSMENTS: OD_TRICHIASIS: RLL 2+

## 2024-02-09 ASSESSMENT — LID POSITION - ENTROPION: OD_ENTROPION: RLL 2+

## 2024-02-15 ENCOUNTER — APPOINTMENT (OUTPATIENT)
Dept: CARDIOLOGY | Facility: CLINIC | Age: 89
End: 2024-02-15
Payer: MEDICARE

## 2024-02-15 VITALS
RESPIRATION RATE: 16 BRPM | OXYGEN SATURATION: 94 % | HEART RATE: 65 BPM | DIASTOLIC BLOOD PRESSURE: 63 MMHG | SYSTOLIC BLOOD PRESSURE: 97 MMHG

## 2024-02-15 LAB — INR PPP: 1.3 RATIO

## 2024-02-15 PROCEDURE — 93793 ANTICOAG MGMT PT WARFARIN: CPT

## 2024-02-17 NOTE — ED PROVIDER NOTE - NS ED ROS FT
Calm
Constitutional: no fever, no chills, +generalized weakness  Eyes: no vision changes  ENT: no nasal congestion, no sore throat  CV: no chest pain  Resp: no cough, no shortness of breath  GI: no abdominal pain, no vomiting, no diarrhea  : no dysuria  MSK: no joint pain  Skin: no rash  Neuro: no headache

## 2024-02-21 ENCOUNTER — OFFICE (OUTPATIENT)
Dept: URBAN - METROPOLITAN AREA CLINIC 104 | Facility: CLINIC | Age: 89
Setting detail: OPHTHALMOLOGY
End: 2024-02-21
Payer: MEDICARE

## 2024-02-21 DIAGNOSIS — H16.223: ICD-10-CM

## 2024-02-21 DIAGNOSIS — H02.032: ICD-10-CM

## 2024-02-21 DIAGNOSIS — H02.012: ICD-10-CM

## 2024-02-21 PROCEDURE — 99024 POSTOP FOLLOW-UP VISIT: CPT | Performed by: OPHTHALMOLOGY

## 2024-02-21 ASSESSMENT — CONFRONTATIONAL VISUAL FIELD TEST (CVF)
OD_FINDINGS: FULL
OS_FINDINGS: FULL

## 2024-02-21 ASSESSMENT — SUPERFICIAL PUNCTATE KERATITIS (SPK)
OS_SPK: 3+
OD_SPK: T

## 2024-02-21 ASSESSMENT — LID EXAM ASSESSMENTS: OD_TRICHIASIS: RLL 2+

## 2024-02-21 ASSESSMENT — LID POSITION - COMMENTS: OD_COMMENTS: WOUND C/D/I, HEALING WELL.GOOD HEIGHT AND GOOD SYMMETRY.GOOD LID TENSION, NO LAG.

## 2024-02-21 ASSESSMENT — LID POSITION - ENTROPION: OD_ENTROPION: ABSENT

## 2024-02-22 ENCOUNTER — APPOINTMENT (OUTPATIENT)
Dept: CARDIOLOGY | Facility: CLINIC | Age: 89
End: 2024-02-22
Payer: MEDICARE

## 2024-02-22 VITALS
RESPIRATION RATE: 16 BRPM | DIASTOLIC BLOOD PRESSURE: 69 MMHG | SYSTOLIC BLOOD PRESSURE: 131 MMHG | HEART RATE: 72 BPM | OXYGEN SATURATION: 97 %

## 2024-02-22 LAB — INR PPP: 1.9 RATIO

## 2024-02-22 PROCEDURE — 93793 ANTICOAG MGMT PT WARFARIN: CPT

## 2024-02-22 NOTE — ED ADULT TRIAGE NOTE - PAIN: PRESENCE, MLM
The patient's goals for the shift include Pain management      Problem: Pain  Goal: My pain/discomfort is manageable  Outcome: Progressing     Problem: Safety  Goal: Patient will be injury free during hospitalization  Outcome: Progressing  Goal: I will remain free of falls  Outcome: Progressing     Problem: Daily Care  Goal: Daily care needs are met  Outcome: Progressing     Problem: Psychosocial Needs  Goal: Demonstrates ability to cope with hospitalization/illness  Outcome: Progressing  Goal: Collaborate with me, my family, and caregiver to identify my specific goals  Outcome: Progressing     Problem: Discharge Barriers  Goal: My discharge needs are met  Outcome: Progressing     Problem: Safety - Adult  Goal: Free from fall injury  Outcome: Progressing     Problem: Discharge Planning  Goal: Discharge to home or other facility with appropriate resources  Outcome: Progressing     Problem: Chronic Conditions and Co-morbidities  Goal: Patient's chronic conditions and co-morbidity symptoms are monitored and maintained or improved  Outcome: Progressing     Problem: Pain  Goal: Takes deep breaths with improved pain control throughout the shift  Outcome: Progressing  Goal: Turns in bed with improved pain control throughout the shift  Outcome: Progressing  Goal: Walks with improved pain control throughout the shift  Outcome: Progressing  Goal: Performs ADL's with improved pain control throughout shift  Outcome: Progressing  Goal: Participates in PT with improved pain control throughout the shift  Outcome: Progressing  Goal: Free from opioid side effects throughout the shift  Outcome: Progressing  Goal: Free from acute confusion related to pain meds throughout the shift  Outcome: Progressing     Problem: Skin  Goal: Decreased wound size/increased tissue granulation at next dressing change  Outcome: Progressing  Flowsheets (Taken 2/22/2024 0837)  Decreased wound size/increased tissue granulation at next dressing change:  Promote sleep for wound healing  Goal: Participates in plan/prevention/treatment measures  Outcome: Progressing  Flowsheets (Taken 2/22/2024 0837)  Participates in plan/prevention/treatment measures: Elevate heels  Goal: Prevent/manage excess moisture  Outcome: Progressing  Flowsheets (Taken 2/22/2024 0837)  Prevent/manage excess moisture:   Moisturize dry skin   Cleanse incontinence/protect with barrier cream  Goal: Prevent/minimize sheer/friction injuries  Outcome: Progressing  Flowsheets (Taken 2/22/2024 0837)  Prevent/minimize sheer/friction injuries: Increase activity/out of bed for meals  Goal: Promote/optimize nutrition  Outcome: Progressing  Flowsheets (Taken 2/22/2024 0837)  Promote/optimize nutrition: Offer water/supplements/favorite foods  Goal: Promote skin healing  Outcome: Progressing  Flowsheets (Taken 2/22/2024 0837)  Promote skin healing: Assess skin/pad under line(s)/device(s)     Problem: Fall/Injury  Goal: Not fall by end of shift  Outcome: Progressing  Goal: Be free from injury by end of the shift  Outcome: Progressing  Goal: Verbalize understanding of personal risk factors for fall in the hospital  Outcome: Progressing  Goal: Verbalize understanding of risk factor reduction measures to prevent injury from fall in the home  Outcome: Progressing  Goal: Use assistive devices by end of the shift  Outcome: Progressing  Goal: Pace activities to prevent fatigue by end of the shift  Outcome: Progressing      complains of pain/discomfort

## 2024-02-29 ENCOUNTER — APPOINTMENT (OUTPATIENT)
Dept: CARDIOLOGY | Facility: CLINIC | Age: 89
End: 2024-02-29
Payer: MEDICARE

## 2024-02-29 VITALS
RESPIRATION RATE: 16 BRPM | OXYGEN SATURATION: 95 % | SYSTOLIC BLOOD PRESSURE: 90 MMHG | HEART RATE: 67 BPM | DIASTOLIC BLOOD PRESSURE: 60 MMHG

## 2024-02-29 LAB — INR PPP: 2 RATIO

## 2024-02-29 PROCEDURE — 93793 ANTICOAG MGMT PT WARFARIN: CPT

## 2024-03-05 ENCOUNTER — RX ONLY (RX ONLY)
Age: 89
End: 2024-03-05

## 2024-03-05 ENCOUNTER — OFFICE (OUTPATIENT)
Dept: URBAN - METROPOLITAN AREA CLINIC 100 | Facility: CLINIC | Age: 89
Setting detail: OPHTHALMOLOGY
End: 2024-03-05
Payer: MEDICARE

## 2024-03-05 DIAGNOSIS — H02.032: ICD-10-CM

## 2024-03-05 DIAGNOSIS — H16.223: ICD-10-CM

## 2024-03-05 DIAGNOSIS — H02.012: ICD-10-CM

## 2024-03-05 PROCEDURE — 99024 POSTOP FOLLOW-UP VISIT: CPT | Performed by: OPHTHALMOLOGY

## 2024-03-05 ASSESSMENT — LID POSITION - COMMENTS: OD_COMMENTS: GOOD HEIGHT AND GOOD SYMMETRY.GOOD LID TENSION, NO LAG.

## 2024-03-05 ASSESSMENT — LID EXAM ASSESSMENTS: OD_TRICHIASIS: RLL 2+

## 2024-03-05 ASSESSMENT — LID POSITION - ENTROPION: OD_ENTROPION: ABSENT

## 2024-03-06 PROBLEM — H26.493 POSTERIOR CAPSULAR OPACIFICATION; BOTH EYES: Status: ACTIVE | Noted: 2024-03-05

## 2024-03-07 ENCOUNTER — NON-APPOINTMENT (OUTPATIENT)
Age: 89
End: 2024-03-07

## 2024-04-02 ENCOUNTER — RX RENEWAL (OUTPATIENT)
Age: 89
End: 2024-04-02

## 2024-04-11 ENCOUNTER — APPOINTMENT (OUTPATIENT)
Dept: CARDIOLOGY | Facility: CLINIC | Age: 89
End: 2024-04-11

## 2024-04-11 ENCOUNTER — APPOINTMENT (OUTPATIENT)
Dept: CARDIOLOGY | Facility: CLINIC | Age: 89
End: 2024-04-11
Payer: MEDICARE

## 2024-04-11 ENCOUNTER — NON-APPOINTMENT (OUTPATIENT)
Age: 89
End: 2024-04-11

## 2024-04-11 VITALS
HEART RATE: 68 BPM | HEIGHT: 76 IN | BODY MASS INDEX: 26.18 KG/M2 | SYSTOLIC BLOOD PRESSURE: 110 MMHG | OXYGEN SATURATION: 96 % | RESPIRATION RATE: 16 BRPM | DIASTOLIC BLOOD PRESSURE: 70 MMHG | WEIGHT: 215 LBS

## 2024-04-11 DIAGNOSIS — I38 ENDOCARDITIS, VALVE UNSPECIFIED: ICD-10-CM

## 2024-04-11 DIAGNOSIS — E78.5 HYPERLIPIDEMIA, UNSPECIFIED: ICD-10-CM

## 2024-04-11 DIAGNOSIS — I48.20 CHRONIC ATRIAL FIBRILLATION, UNSP: ICD-10-CM

## 2024-04-11 DIAGNOSIS — I25.10 ATHEROSCLEROTIC HEART DISEASE OF NATIVE CORONARY ARTERY W/OUT ANGINA PECTORIS: ICD-10-CM

## 2024-04-11 LAB — INR PPP: 2.7 RATIO

## 2024-04-11 PROCEDURE — G2211 COMPLEX E/M VISIT ADD ON: CPT

## 2024-04-11 PROCEDURE — 99214 OFFICE O/P EST MOD 30 MIN: CPT

## 2024-04-11 PROCEDURE — 93289 INTERROG DEVICE EVAL HEART: CPT

## 2024-04-11 PROCEDURE — 93000 ELECTROCARDIOGRAM COMPLETE: CPT | Mod: 59

## 2024-04-11 PROCEDURE — 93000 ELECTROCARDIOGRAM COMPLETE: CPT

## 2024-04-11 NOTE — HISTORY OF PRESENT ILLNESS
[FreeTextEntry1] : Patient denies any recent cardiac symptoms. Denies any exertional shortness of breath or dizziness.   Reports home blood pressures well controlled. Tolerating Coreg and Valsartan. BP being supported with Midodrine 10 mg BID.   On Coumadin 7.5 mg. INR therapeutic at 2.7. No excessive bleeding or bruising.  Had recent Mohs surgery that was tolerated well.

## 2024-04-11 NOTE — ASSESSMENT
[FreeTextEntry1] : Lab Data ------6/2/20-----12/1/20--3/24/22--12/8/22--9/7/23 Chol---144---------139-----148------119--------134 HDL----36-----------36------42--------38---------39 LDL----83-----------79------81--------63---------72 Trg----149----------137----123-------101-------146 Creat: 0.8  ECHOCARDIOGRAM 1/11/2024: LVEF 25% Regional WMA present LA severely dilated RA dilated Mild to moderate MR PASP 34 mmHg Moderate AS  Echocardiogram 1/11/23 Dilated left ventricle with evidence of ischemic heart disease and severely reduced ejection fraction approximately 25% Mitral valve thickening with moderate to severe MR Moderate-severe TR Moderate aortic valve stenosis Dilated ascending aorta  Echocardiogram 12/29/2022 Left ventricular enlargement with multiple wall motion abnormalities and left ventricular ejection fraction of 20 to 25% Biatrial enlargement with right ventricular enlargement Moderate MR and mild aortic stenosis and insufficiency Mildly dilated aorta  Echocardiogram 10/21/2022: Left ventricular cavity size is mildly enlarged with multiple wall motion abnormalities and a left ventricular ejection fraction of 20 to 25% Severe biatrial enlargement Mildly reduced RV function Moderate MR and TR Mild AS and AI Mild dilatation of the ascending aorta 4.2 cm Compared with prior study there has been a further decline in the left ventricular ejection fraction.  Echocardiogram 9/28/2021: Left ventricular cavity size is mildly enlarged with multiple wall motion normalities ejection fraction of 35 to 40% Severe biatrial enlargement Right ventricle enlargement with mild systolic dysfunction Mild aortic stenosis insufficiency Mildly dilated ascending aorta 4.1 cm Compared to prior study the LVEF is somewhat improved  Echo 9/8/20 LVEF 30-35% Mod. -Sev. decreased left ventricular systolic function Mildly enlarged right ventricle. Severe biatrial enlargement Mild tricuspid regurgitation Mild mitral regurgitation Mild aortic regurgitation Mild pulmonic regurgitation Mild dilation of the aortic root and ascending aorta.  Echocardiogram 3/7/19: LV size enlarged with abnormal septal motion and severe hypokinesis involving the inferoposterior wall consistent with infarction. Global moderate reduction in left ventricular systolic function. Left ventricular ejection fraction 30-35%. Right ventricle marginal with mild RV dysfunction Severe biatrial enlargement. Moderate mitral insufficiency. Mild dilatation of the ascending aorta. Comparison with a prior echocardiogram shows a slight reduction in left ventricular size and a slight improvement in left ventricular systolic function.  Pharmacologic stress 10/20/2022: Moderate to large fixed defect of the inferior inferoseptum and apex consistent with infarction. No evidence of ischemia. Unchanged comparison to prior study 2018.  Nuclear stress test April 26, 2018 Moderate-sized moderate to severe fixed defect of the inferior wall inferoseptum and for apical areas consistent with infarction. Left ventricle dilated with severe global left ventricular dysfunction. No significant ischemia noted. Relatively unchanged in comparison to her prior study March 17, 2016.  CT of the chest January 17, 2018. Sinus Valsalva 4 cm Ascending thoracic aorta 4 cm Descending thoracic aorta 3 cm  ICD interrogation 9/27/23: Battery: 3.2 years 93% V paced Impedance thresholds sensitivities all good No arrhythmias detected LV lead has been turned off  Impression  1. Moderate AS without significant progression by echocardiography and no exertional symptoms.  . 2. Dilatation of the ascending aorta without significant progression.   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 Most recent LVEF 25%  Recent nuclear stress test continues to show large inferior and apical infarct but no ischemia.  4.ICD interrogation demonstrated good threshold, sensitivity and battery life. No arrhythmias recorded.   5. Blood pressure well controlled. Has a hx of hypotension on Entresto. Tolerating Coreg and Valsartan for CM.   6. INR therapeutic. On AC for chronic Afib. [id number] score 5 (age, HTN, prior MI, HF).   7. No coronary symptoms or signs of HF on his exam.  Plan 1. Continue current CV medications for mixed cardiomyopathy, CAD and hypotension.   2. Monitoring of labs including lipid panel through his PMD. Will request more recent records.   3. Continue AC. Repeat INR in 1 month.   4. Exercise to the extent that he can. Any exertional symptoms should be reported.   5. Regular ICD checks as per schedule. Next in 3-month  Clinical follow-up with his ICD checks in 3 months.

## 2024-04-11 NOTE — PHYSICAL EXAM
[FreeTextEntry1] :           Well appearing and nourished with no obvious deformities or distress.  Eyes:  No conjunctival injection and no xanthelasmas. HEENT:  Normocephalic.Normal oral mucosa. No pallor or cyanosis Neck:  No jugular venous distension. with normal A and V wave forms. No palpable adenopathy. Cardiovascular:  Normal rate and rhythm with normal S1, S2 and a grade 2/6 systolic murmur. Distal arterial pulses are normal. No significant peripheral edema. Pulmonary:  Lungs are clear to auscultation and percussion. Normal respiratory pattern without any accessory muscle use Abdomen:  Soft, non-tender ; no palpable organomegaly or masses. Extremities: No digital clubbing, cyanosis or ischemic changes. Skin:  No skin lesions, rashes, ulcers or xanthomas. Psychiatric:  Alert and oriented to person, place and time. Appropriate mood and affect.

## 2024-04-11 NOTE — REASON FOR VISIT
[FreeTextEntry1] : GRAYSON AN is a 88 year-old  M presents here for cardiac follow-up. His medical history includes:  1. Mixed ischemic and nonischemic dilated cardiomyopathy  2. Chronic atrial fibrillation  3. 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  4. Valvular heart disease  5. Unspecified chronic hypotension  6.  Chronic anticoagulation therapy  7. Prostate cancer, S/P RT  April 2021 had colonoscopy with 15 polyps removed.  Lost his wife of many years to widely metastatic adenocarcinoma.  Diagnosed with a "intestinal parasite" Had another colonoscopy for continued weight loss and diagnosed with micro colitis treated with Budesonide.  Diarrhea has stopped and  his weight has been steadily increasing.  Was on valsartan for afterload reduction therapy. 3/15/2023 tried transitioning to Entresto but this resulted in symptomatic hypotension and was discontinued. Back on valsartan 40 mg and tolerating well.

## 2024-04-11 NOTE — REVIEW OF SYSTEMS
[Weight Gain (___ Lbs)] : no recent weight gain [Weight Loss (___ Lbs)] : no recent weight loss [Negative] : Psychiatric

## 2024-04-12 ENCOUNTER — OFFICE (OUTPATIENT)
Dept: URBAN - METROPOLITAN AREA CLINIC 116 | Facility: CLINIC | Age: 89
Setting detail: OPHTHALMOLOGY
End: 2024-04-12
Payer: MEDICARE

## 2024-04-12 DIAGNOSIS — H16.223: ICD-10-CM

## 2024-04-12 DIAGNOSIS — H52.4: ICD-10-CM

## 2024-04-12 PROCEDURE — 92015 DETERMINE REFRACTIVE STATE: CPT | Performed by: OPTOMETRIST

## 2024-04-12 PROCEDURE — 99024 POSTOP FOLLOW-UP VISIT: CPT | Performed by: OPTOMETRIST

## 2024-04-12 ASSESSMENT — LID POSITION - ENTROPION: OD_ENTROPION: ABSENT

## 2024-04-12 ASSESSMENT — LID EXAM ASSESSMENTS: OD_TRICHIASIS: RLL 2+

## 2024-04-12 ASSESSMENT — LID POSITION - COMMENTS: OD_COMMENTS: GOOD HEIGHT AND GOOD SYMMETRY.GOOD LID TENSION, NO LAG.

## 2024-04-22 RX ORDER — WARFARIN 1 MG/1
1 TABLET ORAL
Qty: 180 | Refills: 3 | Status: ACTIVE | COMMUNITY
Start: 2018-09-28 | End: 1900-01-01

## 2024-05-16 ENCOUNTER — APPOINTMENT (OUTPATIENT)
Dept: CARDIOLOGY | Facility: CLINIC | Age: 89
End: 2024-05-16
Payer: MEDICARE

## 2024-05-16 VITALS
RESPIRATION RATE: 16 BRPM | HEART RATE: 67 BPM | DIASTOLIC BLOOD PRESSURE: 76 MMHG | OXYGEN SATURATION: 94 % | SYSTOLIC BLOOD PRESSURE: 112 MMHG

## 2024-05-16 LAB — INR PPP: 1.9 RATIO

## 2024-05-16 PROCEDURE — 93793 ANTICOAG MGMT PT WARFARIN: CPT

## 2024-05-30 ENCOUNTER — APPOINTMENT (OUTPATIENT)
Dept: CARDIOLOGY | Facility: CLINIC | Age: 89
End: 2024-05-30
Payer: MEDICARE

## 2024-05-30 VITALS — HEART RATE: 78 BPM | SYSTOLIC BLOOD PRESSURE: 126 MMHG | OXYGEN SATURATION: 96 % | DIASTOLIC BLOOD PRESSURE: 74 MMHG

## 2024-05-30 LAB — INR PPP: 2.1 RATIO

## 2024-05-30 PROCEDURE — 93793 ANTICOAG MGMT PT WARFARIN: CPT

## 2024-06-10 NOTE — ED PROVIDER NOTE - PATIENT PORTAL LINK FT
No
You can access the FollowMyHealth Patient Portal offered by [facility] by registering at the following website: [email or web address]. By joining [facility]’s FollowMyHealth portal, you will also be able to view your health information using other applications (apps) compatible with our system.

## 2024-06-22 NOTE — REASON FOR VISIT
Follow up with primary doctor as needed  Use zofran for nausea relief   Return if you develop fever, or other severe symptoms    [FreeTextEntry1] : Patient returns here for medical clearance prior to a series of epidural injections and possible nerve root ablation and  followup with regard to management of problems which include:\par \par 1. Mixed ischemic and nonischemic dilated cardiomyopathy\par \par 2. Chronic atrial fibrillation\par \par 3. 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 Valvular heart disease\par \par 5. Unspecified hypotension\par \par 6.  Chronic anticoagulation therapy

## 2024-06-24 RX ORDER — VALSARTAN 40 MG/1
40 TABLET, COATED ORAL
Qty: 90 | Refills: 3 | Status: ACTIVE | COMMUNITY
Start: 2022-12-21 | End: 1900-01-01

## 2024-06-27 ENCOUNTER — APPOINTMENT (OUTPATIENT)
Dept: CARDIOLOGY | Facility: CLINIC | Age: 89
End: 2024-06-27
Payer: MEDICARE

## 2024-06-27 VITALS
DIASTOLIC BLOOD PRESSURE: 80 MMHG | SYSTOLIC BLOOD PRESSURE: 129 MMHG | RESPIRATION RATE: 16 BRPM | OXYGEN SATURATION: 97 % | HEART RATE: 74 BPM

## 2024-06-27 LAB — INR PPP: 2.3 RATIO

## 2024-06-27 PROCEDURE — 93793 ANTICOAG MGMT PT WARFARIN: CPT

## 2024-07-11 ENCOUNTER — APPOINTMENT (OUTPATIENT)
Dept: OTOLARYNGOLOGY | Facility: CLINIC | Age: 89
End: 2024-07-11

## 2024-07-24 ENCOUNTER — APPOINTMENT (OUTPATIENT)
Dept: CARDIOLOGY | Facility: CLINIC | Age: 89
End: 2024-07-24

## 2024-07-25 ENCOUNTER — APPOINTMENT (OUTPATIENT)
Dept: CARDIOLOGY | Facility: CLINIC | Age: 89
End: 2024-07-25
Payer: MEDICARE

## 2024-07-25 VITALS
OXYGEN SATURATION: 94 % | DIASTOLIC BLOOD PRESSURE: 72 MMHG | HEART RATE: 80 BPM | RESPIRATION RATE: 16 BRPM | SYSTOLIC BLOOD PRESSURE: 106 MMHG

## 2024-07-25 LAB — INR PPP: 2.5 RATIO

## 2024-07-25 PROCEDURE — 93793 ANTICOAG MGMT PT WARFARIN: CPT

## 2024-08-01 ENCOUNTER — APPOINTMENT (OUTPATIENT)
Dept: CARDIOLOGY | Facility: CLINIC | Age: 89
End: 2024-08-01
Payer: MEDICARE

## 2024-08-01 VITALS
DIASTOLIC BLOOD PRESSURE: 80 MMHG | OXYGEN SATURATION: 96 % | HEIGHT: 76 IN | RESPIRATION RATE: 16 BRPM | BODY MASS INDEX: 27.4 KG/M2 | WEIGHT: 225 LBS | SYSTOLIC BLOOD PRESSURE: 140 MMHG | HEART RATE: 65 BPM

## 2024-08-01 DIAGNOSIS — Z95.810 PRESENCE OF AUTOMATIC (IMPLANTABLE) CARDIAC DEFIBRILLATOR: ICD-10-CM

## 2024-08-01 DIAGNOSIS — I48.20 CHRONIC ATRIAL FIBRILLATION, UNSP: ICD-10-CM

## 2024-08-01 DIAGNOSIS — I25.10 ATHEROSCLEROTIC HEART DISEASE OF NATIVE CORONARY ARTERY W/OUT ANGINA PECTORIS: ICD-10-CM

## 2024-08-01 DIAGNOSIS — I95.89 OTHER HYPOTENSION: ICD-10-CM

## 2024-08-01 DIAGNOSIS — I42.9 CARDIOMYOPATHY, UNSPECIFIED: ICD-10-CM

## 2024-08-01 DIAGNOSIS — E78.5 HYPERLIPIDEMIA, UNSPECIFIED: ICD-10-CM

## 2024-08-01 LAB — INR PPP: 1.9 RATIO

## 2024-08-01 PROCEDURE — 93289 INTERROG DEVICE EVAL HEART: CPT

## 2024-08-01 PROCEDURE — 93000 ELECTROCARDIOGRAM COMPLETE: CPT | Mod: 59

## 2024-08-01 PROCEDURE — G2211 COMPLEX E/M VISIT ADD ON: CPT

## 2024-08-01 PROCEDURE — 99214 OFFICE O/P EST MOD 30 MIN: CPT

## 2024-08-01 NOTE — HISTORY OF PRESENT ILLNESS
[FreeTextEntry1] : Believes that he has become a bit more short of breath with exertion over the last 3 months.  No PND, orthopnea or edema.  Reports home blood pressures well controlled. Tolerating Coreg and Valsartan. BP being supported with Midodrine 10 mg BID.  Typically about 110/70.  On Coumadin 7.5 mg. INR therapeutic 1.9. No excessive bleeding or bruising.  Had recent Mohs surgery that was tolerated well.

## 2024-08-01 NOTE — ASSESSMENT
[FreeTextEntry1] : EC% V paced at 65 bpm.  Lab Data ------20-----20--3/24/22--22--23 Chol---144---------139-----148------119--------134 HDL----36-----------36------42--------38---------39 LDL----83-----------79------81--------63---------72 Trg----149----------137----123-------101-------146 Creat: 0.8  ECHOCARDIOGRAM 2024: LVEF 25% Regional WMA present LA severely dilated RA dilated Mild to moderate MR PASP 34 mmHg Moderate AS  Echocardiogram 23 Dilated left ventricle with evidence of ischemic heart disease and severely reduced ejection fraction approximately 25% Mitral valve thickening with moderate to severe MR Moderate-severe TR Moderate aortic valve stenosis Dilated ascending aorta  Echocardiogram 2022 Left ventricular enlargement with multiple wall motion abnormalities and left ventricular ejection fraction of 20 to 25% Biatrial enlargement with right ventricular enlargement Moderate MR and mild aortic stenosis and insufficiency Mildly dilated aorta  Echocardiogram 10/21/2022: Left ventricular cavity size is mildly enlarged with multiple wall motion abnormalities and a left ventricular ejection fraction of 20 to 25% Severe biatrial enlargement Mildly reduced RV function Moderate MR and TR Mild AS and AI Mild dilatation of the ascending aorta 4.2 cm Compared with prior study there has been a further decline in the left ventricular ejection fraction.  Echocardiogram 2021: Left ventricular cavity size is mildly enlarged with multiple wall motion normalities ejection fraction of 35 to 40% Severe biatrial enlargement Right ventricle enlargement with mild systolic dysfunction Mild aortic stenosis insufficiency Mildly dilated ascending aorta 4.1 cm Compared to prior study the LVEF is somewhat improved  Echo 20 LVEF 30-35% Mod. -Sev. decreased left ventricular systolic function Mildly enlarged right ventricle. Severe biatrial enlargement Mild tricuspid regurgitation Mild mitral regurgitation Mild aortic regurgitation Mild pulmonic regurgitation Mild dilation of the aortic root and ascending aorta.  Echocardiogram 3/7/19: LV size enlarged with abnormal septal motion and severe hypokinesis involving the inferoposterior wall consistent with infarction. Global moderate reduction in left ventricular systolic function. Left ventricular ejection fraction 30-35%. Right ventricle marginal with mild RV dysfunction Severe biatrial enlargement. Moderate mitral insufficiency. Mild dilatation of the ascending aorta. Comparison with a prior echocardiogram shows a slight reduction in left ventricular size and a slight improvement in left ventricular systolic function.  Pharmacologic stress 10/20/2022: Moderate to large fixed defect of the inferior inferoseptum and apex consistent with infarction. No evidence of ischemia. Unchanged comparison to prior study .  Nuclear stress test 2018 Moderate-sized moderate to severe fixed defect of the inferior wall inferoseptum and for apical areas consistent with infarction. Left ventricle dilated with severe global left ventricular dysfunction. No significant ischemia noted. Relatively unchanged in comparison to her prior study 2016.  CT of the chest 2018. Sinus Valsalva 4 cm Ascending thoracic aorta 4 cm Descending thoracic aorta 3 cm  ICD interrogation 24 Battery: 2.6 years 87% V paced Impedance thresholds sensitivities all good No arrhythmias detected LV lead has been turned off  Impression Increasing shortness of breath of uncertain etiology. 1. Moderate AS by echo 2024 without significant progression by echocardiography and no exertional symptoms.  Examination is similar. . 2. Dilatation of the ascending aorta without significant progression.   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 Most recent LVEF 25%  Recent nuclear stress test continues to show large inferior and apical infarct but no ischemia.  4.ICD interrogation demonstrated good threshold, sensitivity and battery life. No arrhythmias recorded.     Possible that pacing algorithm does not provide adequate rate responsiveness.  5. Blood pressure well controlled. Has a hx of hypotension on Entresto. Tolerating Coreg and Valsartan for CM.   6. INR therapeutic. On AC for chronic Afib. [id number] score 5 (age, HTN, prior MI, HF).   7. No coronary symptoms or signs of HF on his exam.  Plan 1.  Will arrange for the patient to have a Shukla protocol exercise stress test.      Based on the heart rate response we will then consider adjusting pacing algorithm to increase rate sensitivity.  2. Monitoring of labs including lipid panel through his PMD. Will request more recent records.   3. Continue AC. Repeat INR in 1 month.   4.  Repeat echocardiogram to check on the potential progression of the aortic valve stenosis  5. Regular ICD checks as per schedule. Next in 3-month  Clinical follow-up with his ICD checks in 3 months.

## 2024-08-01 NOTE — REASON FOR VISIT
[FreeTextEntry1] : GRAYSON AN is a 88 year-old  M presents here for cardiac follow-up. His medical history includes:  1. Mixed ischemic and nonischemic dilated cardiomyopathy  2. Chronic atrial fibrillation  3. 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  4. Valvular heart disease  5. Unspecified chronic hypotension  6.  Chronic anticoagulation therapy  7. Prostate cancer, S/P RT  April 2021 had colonoscopy with 15 polyps removed.  Lost his wife of many years to widely metastatic adenocarcinoma.  Diagnosed with a "intestinal parasite" Had another colonoscopy for continued weight loss and diagnosed with micro colitis treated with Budesonide.  Diarrhea has stopped and  his weight has been steadily increasing.  Was on valsartan for afterload reduction therapy. 3/15/2023 tried transitioning to Entresto but this resulted in symptomatic hypotension and was discontinued. Back on valsartan 40 mg and tolerating well, with midodrine support.

## 2024-08-01 NOTE — REVIEW OF SYSTEMS
[Negative] : Psychiatric [Weight Gain (___ Lbs)] : [unfilled] ~Ulb weight gain [Weight Loss (___ Lbs)] : no recent weight loss

## 2024-08-22 ENCOUNTER — APPOINTMENT (OUTPATIENT)
Dept: CARDIOLOGY | Facility: CLINIC | Age: 89
End: 2024-08-22
Payer: MEDICARE

## 2024-08-22 VITALS
RESPIRATION RATE: 16 BRPM | HEART RATE: 77 BPM | SYSTOLIC BLOOD PRESSURE: 117 MMHG | OXYGEN SATURATION: 94 % | DIASTOLIC BLOOD PRESSURE: 75 MMHG

## 2024-08-22 LAB — INR PPP: 2.8 RATIO

## 2024-08-22 PROCEDURE — 93793 ANTICOAG MGMT PT WARFARIN: CPT

## 2024-08-22 PROCEDURE — 85610 PROTHROMBIN TIME: CPT | Mod: QW

## 2024-09-11 ENCOUNTER — APPOINTMENT (OUTPATIENT)
Dept: CARDIOLOGY | Facility: CLINIC | Age: 89
End: 2024-09-11
Payer: MEDICARE

## 2024-09-11 VITALS — DIASTOLIC BLOOD PRESSURE: 70 MMHG | HEART RATE: 63 BPM | SYSTOLIC BLOOD PRESSURE: 106 MMHG

## 2024-09-11 DIAGNOSIS — Z95.810 PRESENCE OF AUTOMATIC (IMPLANTABLE) CARDIAC DEFIBRILLATOR: ICD-10-CM

## 2024-09-11 DIAGNOSIS — E78.5 HYPERLIPIDEMIA, UNSPECIFIED: ICD-10-CM

## 2024-09-11 DIAGNOSIS — I25.10 ATHEROSCLEROTIC HEART DISEASE OF NATIVE CORONARY ARTERY W/OUT ANGINA PECTORIS: ICD-10-CM

## 2024-09-11 DIAGNOSIS — I95.89 OTHER HYPOTENSION: ICD-10-CM

## 2024-09-11 DIAGNOSIS — I42.9 CARDIOMYOPATHY, UNSPECIFIED: ICD-10-CM

## 2024-09-11 DIAGNOSIS — I48.20 CHRONIC ATRIAL FIBRILLATION, UNSP: ICD-10-CM

## 2024-09-11 LAB — INR PPP: 3.7 RATIO

## 2024-09-11 PROCEDURE — 93000 ELECTROCARDIOGRAM COMPLETE: CPT

## 2024-09-11 PROCEDURE — G2211 COMPLEX E/M VISIT ADD ON: CPT

## 2024-09-11 PROCEDURE — 99215 OFFICE O/P EST HI 40 MIN: CPT | Mod: 25

## 2024-09-11 PROCEDURE — 93015 CV STRESS TEST SUPVJ I&R: CPT

## 2024-09-11 PROCEDURE — 93000 ELECTROCARDIOGRAM COMPLETE: CPT | Mod: 59

## 2024-09-11 PROCEDURE — 99215 OFFICE O/P EST HI 40 MIN: CPT

## 2024-09-11 NOTE — ASSESSMENT
[FreeTextEntry1] :  Lab Data ------6/2/20-----12/1/20--3/24/22--12/8/22--9/7/23 Chol---144---------139-----148------119--------134 HDL----36-----------36------42--------38---------39 LDL----83-----------79------81--------63---------72 Trg----149----------137----123-------101-------146 Creat: 0.8   Exercise stress test Brock protocol Exercise time: 4 minutes Heart rate: 109 81% of predicted Blood pressure response normal EKG nondiagnostic 100% V paced  ECHOCARDIOGRAM 1/11/2024: LVEF 25% Regional WMA present LA severely dilated RA dilated Mild to moderate MR PASP 34 mmHg Moderate AS  Echocardiogram 1/11/23 Dilated left ventricle with evidence of ischemic heart disease and severely reduced ejection fraction approximately 25% Mitral valve thickening with moderate to severe MR Moderate-severe TR Moderate aortic valve stenosis Dilated ascending aorta  Echocardiogram 12/29/2022 Left ventricular enlargement with multiple wall motion abnormalities and left ventricular ejection fraction of 20 to 25% Biatrial enlargement with right ventricular enlargement Moderate MR and mild aortic stenosis and insufficiency Mildly dilated aorta  Echocardiogram 10/21/2022: Left ventricular cavity size is mildly enlarged with multiple wall motion abnormalities and a left ventricular ejection fraction of 20 to 25% Severe biatrial enlargement Mildly reduced RV function Moderate MR and TR Mild AS and AI Mild dilatation of the ascending aorta 4.2 cm Compared with prior study there has been a further decline in the left ventricular ejection fraction.  Echocardiogram 9/28/2021: Left ventricular cavity size is mildly enlarged with multiple wall motion normalities ejection fraction of 35 to 40% Severe biatrial enlargement Right ventricle enlargement with mild systolic dysfunction Mild aortic stenosis insufficiency Mildly dilated ascending aorta 4.1 cm Compared to prior study the LVEF is somewhat improved  Echo 9/8/20 LVEF 30-35% Mod. -Sev. decreased left ventricular systolic function Mildly enlarged right ventricle. Severe biatrial enlargement Mild tricuspid regurgitation Mild mitral regurgitation Mild aortic regurgitation Mild pulmonic regurgitation Mild dilation of the aortic root and ascending aorta.  Echocardiogram 3/7/19: LV size enlarged with abnormal septal motion and severe hypokinesis involving the inferoposterior wall consistent with infarction. Global moderate reduction in left ventricular systolic function. Left ventricular ejection fraction 30-35%. Right ventricle marginal with mild RV dysfunction Severe biatrial enlargement. Moderate mitral insufficiency. Mild dilatation of the ascending aorta. Comparison with a prior echocardiogram shows a slight reduction in left ventricular size and a slight improvement in left ventricular systolic function.  Pharmacologic stress 10/20/2022: Moderate to large fixed defect of the inferior inferoseptum and apex consistent with infarction. No evidence of ischemia. Unchanged comparison to prior study 2018.  Nuclear stress test April 26, 2018 Moderate-sized moderate to severe fixed defect of the inferior wall inferoseptum and for apical areas consistent with infarction. Left ventricle dilated with severe global left ventricular dysfunction. No significant ischemia noted. Relatively unchanged in comparison to her prior study March 17, 2016.  CT of the chest January 17, 2018. Sinus Valsalva 4 cm Ascending thoracic aorta 4 cm Descending thoracic aorta 3 cm  ICD interrogation 9/11/2024 Battery 2.5 years V pacing 87% LV lead turned off Maximum tracking rate 110, increased to 115 Rate response recovery time slow [place] increased from 9-11  ICD interrogation 8/1/24 Battery: 2.6 years 87% V paced Impedance thresholds sensitivities all good No arrhythmias detected LV lead has been turned off  Impression Patient's primary limitation when walking the treadmill was leg fatigue.  He was mildly short of breath but not excessively so. His heart rate response while not achieving 85% of maximum was reasonable going from  at 4 minutes of unknown protocol. At his age and given his general limitations, this seems to be more than adequate. As was explained to him and his family, I do not think the chronotropic incompetence explains his symptoms. His ejection fraction has not really changed and he has remained about 25%. He is not overtly in congestive heart failure. Most likely explanation of his decline in functionality is deconditioning. Family notes that he is much more sedentary than he used to be before his wife passed away. We made some minor adjustments to his device to increase the heart rate responsiveness as noted above.  1. Moderate AS by echo January 2024 without significant progression by echocardiography and no exertional symptoms.  Examination is similar. . 2. Dilatation of the ascending aorta without significant progression.   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 Most recent LVEF 25%  Recent nuclear stress test continues to show large inferior and apical infarct but no ischemia.  4.ICD interrogation demonstrated good threshold, sensitivity and battery life. No arrhythmias recorded.     Possible that pacing algorithm does not provide adequate rate responsiveness.  5. Blood pressure well controlled. Has a hx of hypotension on Entresto. Tolerating Coreg and Valsartan for CM.   6. INR therapeutic. On AC for chronic Afib. [id number] score 5 (age, HTN, prior MI, HF).   7. No coronary symptoms or signs of HF on his exam.  Plan 1.  Patient was encouraged to exercise more regularly.  Do believe that this is what will benefit him most.  2. Monitoring of labs including lipid panel through his PMD. Will request more recent records.   3.  Hold Coumadin for 1 day and recheck in 2 weeks.  4.  Repeat echocardiogram to check on the potential progression of the aortic valve stenosis  5. Regular ICD checks as per schedule. Next in 3-month  Clinical follow-up with his ICD checks in 3 months.

## 2024-09-11 NOTE — REASON FOR VISIT
[FreeTextEntry1] : GRAYSON AN is a 88 year-old  M presents here for cardiac evaluation of his symptoms of progressive exertional difficulty with shortness of breath. Objective assessment of his limitations with a treadmill was planned.  We need to determine whether or not his increasing limitation was due to chronotropic incompetence with the aid of treadmill and pacemaker interrogation.  His medical history includes:  1. Mixed ischemic and nonischemic dilated cardiomyopathy  2. Chronic atrial fibrillation  3. 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  4. Valvular heart disease  5. Unspecified chronic hypotension  6.  Chronic anticoagulation therapy  7. Prostate cancer, S/P RT  April 2021 had colonoscopy with 15 polyps removed.  Lost his wife of many years to widely metastatic adenocarcinoma.  Diagnosed with a "intestinal parasite" Had another colonoscopy for continued weight loss and diagnosed with micro colitis treated with Budesonide.  Diarrhea has stopped and  his weight has been steadily increasing.  Was on valsartan for afterload reduction therapy. 3/15/2023 tried transitioning to Entresto but this resulted in symptomatic hypotension and was discontinued. Back on valsartan 40 mg and tolerating well, with midodrine support.

## 2024-09-11 NOTE — HISTORY OF PRESENT ILLNESS
[FreeTextEntry1] : Believes that he has become a bit more short of breath with exertion over the last 3 months.  No PND, orthopnea or edema.  Reports home blood pressures well controlled. Tolerating Coreg and Valsartan. BP being supported with Midodrine 10 mg BID.  Typically about 110/70.  On Coumadin 7.5 mg. INR supratherapeutic at 3.7.  No excessive bleeding or bruising.  Had recent Mohs surgery that was tolerated well.

## 2024-09-11 NOTE — REVIEW OF SYSTEMS
[Weight Gain (___ Lbs)] : [unfilled] ~Ulb weight gain [Negative] : Psychiatric [Weight Loss (___ Lbs)] : no recent weight loss

## 2024-09-26 ENCOUNTER — APPOINTMENT (OUTPATIENT)
Dept: CARDIOLOGY | Facility: CLINIC | Age: 89
End: 2024-09-26
Payer: MEDICARE

## 2024-09-26 VITALS — HEART RATE: 67 BPM | SYSTOLIC BLOOD PRESSURE: 111 MMHG | DIASTOLIC BLOOD PRESSURE: 67 MMHG

## 2024-09-26 LAB — INR PPP: 2.7 RATIO

## 2024-09-26 PROCEDURE — 85610 PROTHROMBIN TIME: CPT | Mod: QW

## 2024-09-26 PROCEDURE — 93793 ANTICOAG MGMT PT WARFARIN: CPT

## 2024-10-09 ENCOUNTER — RX RENEWAL (OUTPATIENT)
Age: 89
End: 2024-10-09

## 2024-10-24 ENCOUNTER — APPOINTMENT (OUTPATIENT)
Dept: CARDIOLOGY | Facility: CLINIC | Age: 89
End: 2024-10-24
Payer: MEDICARE

## 2024-10-24 VITALS
SYSTOLIC BLOOD PRESSURE: 97 MMHG | DIASTOLIC BLOOD PRESSURE: 65 MMHG | RESPIRATION RATE: 16 BRPM | OXYGEN SATURATION: 97 % | HEART RATE: 90 BPM

## 2024-10-24 PROCEDURE — 85610 PROTHROMBIN TIME: CPT | Mod: QW

## 2024-10-24 PROCEDURE — 93793 ANTICOAG MGMT PT WARFARIN: CPT

## 2024-10-27 LAB — INR PPP: 2.2 RATIO

## 2024-11-19 ENCOUNTER — NON-APPOINTMENT (OUTPATIENT)
Age: 89
End: 2024-11-19

## 2024-11-21 ENCOUNTER — APPOINTMENT (OUTPATIENT)
Dept: OTOLARYNGOLOGY | Facility: CLINIC | Age: 89
End: 2024-11-21
Payer: MEDICARE

## 2024-11-21 VITALS
HEIGHT: 76 IN | DIASTOLIC BLOOD PRESSURE: 61 MMHG | HEART RATE: 101 BPM | WEIGHT: 225 LBS | BODY MASS INDEX: 27.4 KG/M2 | SYSTOLIC BLOOD PRESSURE: 91 MMHG

## 2024-11-21 DIAGNOSIS — J35.1 HYPERTROPHY OF TONSILS: ICD-10-CM

## 2024-11-21 DIAGNOSIS — Q31.8 OTHER CONGENITAL MALFORMATIONS OF LARYNX: ICD-10-CM

## 2024-11-21 DIAGNOSIS — D49.0 NEOPLASM OF UNSPECIFIED BEHAVIOR OF DIGESTIVE SYSTEM: ICD-10-CM

## 2024-11-21 PROCEDURE — 99214 OFFICE O/P EST MOD 30 MIN: CPT | Mod: 25

## 2024-11-21 PROCEDURE — 31575 DIAGNOSTIC LARYNGOSCOPY: CPT

## 2024-11-22 ENCOUNTER — APPOINTMENT (OUTPATIENT)
Dept: CARDIOLOGY | Facility: CLINIC | Age: 89
End: 2024-11-22
Payer: MEDICARE

## 2024-11-22 VITALS
RESPIRATION RATE: 16 BRPM | DIASTOLIC BLOOD PRESSURE: 70 MMHG | HEART RATE: 94 BPM | OXYGEN SATURATION: 95 % | SYSTOLIC BLOOD PRESSURE: 107 MMHG

## 2024-11-22 LAB — INR PPP: 2.6 RATIO

## 2024-11-22 PROCEDURE — 93793 ANTICOAG MGMT PT WARFARIN: CPT

## 2024-11-22 PROCEDURE — 85610 PROTHROMBIN TIME: CPT | Mod: QW

## 2024-12-16 ENCOUNTER — EMERGENCY (EMERGENCY)
Facility: HOSPITAL | Age: 89
LOS: 1 days | Discharge: HOME | End: 2024-12-16
Attending: EMERGENCY MEDICINE
Payer: MEDICARE

## 2024-12-16 VITALS
TEMPERATURE: 98 F | DIASTOLIC BLOOD PRESSURE: 55 MMHG | OXYGEN SATURATION: 94 % | HEART RATE: 62 BPM | RESPIRATION RATE: 18 BRPM | SYSTOLIC BLOOD PRESSURE: 105 MMHG

## 2024-12-16 VITALS
OXYGEN SATURATION: 97 % | DIASTOLIC BLOOD PRESSURE: 83 MMHG | WEIGHT: 214.95 LBS | RESPIRATION RATE: 18 BRPM | SYSTOLIC BLOOD PRESSURE: 96 MMHG | HEIGHT: 76 IN | TEMPERATURE: 98 F | HEART RATE: 83 BPM

## 2024-12-16 DIAGNOSIS — Z98.890 OTHER SPECIFIED POSTPROCEDURAL STATES: Chronic | ICD-10-CM

## 2024-12-16 DIAGNOSIS — Z98.89 OTHER SPECIFIED POSTPROCEDURAL STATES: Chronic | ICD-10-CM

## 2024-12-16 DIAGNOSIS — Z95.810 PRESENCE OF AUTOMATIC (IMPLANTABLE) CARDIAC DEFIBRILLATOR: Chronic | ICD-10-CM

## 2024-12-16 DIAGNOSIS — Z98.49 CATARACT EXTRACTION STATUS, UNSPECIFIED EYE: Chronic | ICD-10-CM

## 2024-12-16 DIAGNOSIS — N28.0 ISCHEMIA AND INFARCTION OF KIDNEY: Chronic | ICD-10-CM

## 2024-12-16 DIAGNOSIS — Z95.1 PRESENCE OF AORTOCORONARY BYPASS GRAFT: Chronic | ICD-10-CM

## 2024-12-16 DIAGNOSIS — Z95.5 PRESENCE OF CORONARY ANGIOPLASTY IMPLANT AND GRAFT: Chronic | ICD-10-CM

## 2024-12-16 LAB
ALBUMIN SERPL ELPH-MCNC: 3.7 G/DL — SIGNIFICANT CHANGE UP (ref 3.3–5.2)
ALP SERPL-CCNC: 59 U/L — SIGNIFICANT CHANGE UP (ref 40–120)
ALT FLD-CCNC: 14 U/L — SIGNIFICANT CHANGE UP
ANION GAP SERPL CALC-SCNC: 7 MMOL/L — SIGNIFICANT CHANGE UP (ref 5–17)
APTT BLD: 43.7 SEC — HIGH (ref 24.5–35.6)
AST SERPL-CCNC: 18 U/L — SIGNIFICANT CHANGE UP
BASOPHILS # BLD AUTO: 0.06 K/UL — SIGNIFICANT CHANGE UP (ref 0–0.2)
BASOPHILS NFR BLD AUTO: 0.9 % — SIGNIFICANT CHANGE UP (ref 0–2)
BILIRUB SERPL-MCNC: 0.2 MG/DL — LOW (ref 0.4–2)
BUN SERPL-MCNC: 24.4 MG/DL — HIGH (ref 8–20)
CALCIUM SERPL-MCNC: 9.2 MG/DL — SIGNIFICANT CHANGE UP (ref 8.4–10.5)
CHLORIDE SERPL-SCNC: 106 MMOL/L — SIGNIFICANT CHANGE UP (ref 96–108)
CO2 SERPL-SCNC: 30 MMOL/L — HIGH (ref 22–29)
CREAT SERPL-MCNC: 0.62 MG/DL — SIGNIFICANT CHANGE UP (ref 0.5–1.3)
EGFR: 92 ML/MIN/1.73M2 — SIGNIFICANT CHANGE UP
EOSINOPHIL # BLD AUTO: 0.47 K/UL — SIGNIFICANT CHANGE UP (ref 0–0.5)
EOSINOPHIL NFR BLD AUTO: 6.9 % — HIGH (ref 0–6)
GLUCOSE SERPL-MCNC: 110 MG/DL — HIGH (ref 70–99)
HCT VFR BLD CALC: 31.7 % — LOW (ref 39–50)
HGB BLD-MCNC: 10 G/DL — LOW (ref 13–17)
IMM GRANULOCYTES NFR BLD AUTO: 1 % — HIGH (ref 0–0.9)
INR BLD: 3.2 RATIO — HIGH (ref 0.85–1.16)
LYMPHOCYTES # BLD AUTO: 1.56 K/UL — SIGNIFICANT CHANGE UP (ref 1–3.3)
LYMPHOCYTES # BLD AUTO: 23 % — SIGNIFICANT CHANGE UP (ref 13–44)
MCHC RBC-ENTMCNC: 31.5 G/DL — LOW (ref 32–36)
MCHC RBC-ENTMCNC: 32.4 PG — SIGNIFICANT CHANGE UP (ref 27–34)
MCV RBC AUTO: 102.6 FL — HIGH (ref 80–100)
MONOCYTES # BLD AUTO: 0.61 K/UL — SIGNIFICANT CHANGE UP (ref 0–0.9)
MONOCYTES NFR BLD AUTO: 9 % — SIGNIFICANT CHANGE UP (ref 2–14)
NEUTROPHILS # BLD AUTO: 4 K/UL — SIGNIFICANT CHANGE UP (ref 1.8–7.4)
NEUTROPHILS NFR BLD AUTO: 59.2 % — SIGNIFICANT CHANGE UP (ref 43–77)
PLATELET # BLD AUTO: 163 K/UL — SIGNIFICANT CHANGE UP (ref 150–400)
POTASSIUM SERPL-MCNC: 4.9 MMOL/L — SIGNIFICANT CHANGE UP (ref 3.5–5.3)
POTASSIUM SERPL-SCNC: 4.9 MMOL/L — SIGNIFICANT CHANGE UP (ref 3.5–5.3)
PROT SERPL-MCNC: 6.4 G/DL — LOW (ref 6.6–8.7)
PROTHROM AB SERPL-ACNC: 35.7 SEC — HIGH (ref 9.9–13.4)
RAPID RVP RESULT: DETECTED
RBC # BLD: 3.09 M/UL — LOW (ref 4.2–5.8)
RBC # FLD: 16.3 % — HIGH (ref 10.3–14.5)
RV+EV RNA SPEC QL NAA+PROBE: DETECTED
SARS-COV-2 RNA SPEC QL NAA+PROBE: SIGNIFICANT CHANGE UP
SODIUM SERPL-SCNC: 143 MMOL/L — SIGNIFICANT CHANGE UP (ref 135–145)
WBC # BLD: 6.77 K/UL — SIGNIFICANT CHANGE UP (ref 3.8–10.5)
WBC # FLD AUTO: 6.77 K/UL — SIGNIFICANT CHANGE UP (ref 3.8–10.5)

## 2024-12-16 PROCEDURE — 71250 CT THORAX DX C-: CPT | Mod: MC

## 2024-12-16 PROCEDURE — 71250 CT THORAX DX C-: CPT | Mod: 26,MC

## 2024-12-16 PROCEDURE — 71046 X-RAY EXAM CHEST 2 VIEWS: CPT | Mod: 26

## 2024-12-16 PROCEDURE — 99284 EMERGENCY DEPT VISIT MOD MDM: CPT | Mod: 25

## 2024-12-16 PROCEDURE — 36415 COLL VENOUS BLD VENIPUNCTURE: CPT

## 2024-12-16 PROCEDURE — 0225U NFCT DS DNA&RNA 21 SARSCOV2: CPT

## 2024-12-16 PROCEDURE — 71046 X-RAY EXAM CHEST 2 VIEWS: CPT

## 2024-12-16 PROCEDURE — 85610 PROTHROMBIN TIME: CPT

## 2024-12-16 PROCEDURE — 85730 THROMBOPLASTIN TIME PARTIAL: CPT

## 2024-12-16 PROCEDURE — 85025 COMPLETE CBC W/AUTO DIFF WBC: CPT

## 2024-12-16 PROCEDURE — 99284 EMERGENCY DEPT VISIT MOD MDM: CPT | Mod: GC

## 2024-12-16 PROCEDURE — 80053 COMPREHEN METABOLIC PANEL: CPT

## 2024-12-16 RX ORDER — SODIUM CHLORIDE 9 MG/ML
500 INJECTION, SOLUTION INTRAMUSCULAR; INTRAVENOUS; SUBCUTANEOUS ONCE
Refills: 0 | Status: COMPLETED | OUTPATIENT
Start: 2024-12-16 | End: 2024-12-16

## 2024-12-16 RX ADMIN — SODIUM CHLORIDE 500 MILLILITER(S): 9 INJECTION, SOLUTION INTRAMUSCULAR; INTRAVENOUS; SUBCUTANEOUS at 17:18

## 2024-12-16 NOTE — ED PROVIDER NOTE - PATIENT PORTAL LINK FT
You can access the FollowMyHealth Patient Portal offered by [facility] by registering at the following website: [email or web address]. By joining [email or web address] FollowMyHealth portal, you will also be able to view your health information using other applications (apps) compatible with our system.

## 2024-12-16 NOTE — ED PROVIDER NOTE - NSFOLLOWUPINSTRUCTIONS_ED_ALL_ED_FT
- Your CT imaging did not demonstrate pneumonia today  - You do have Rhino/Enterovirus  - Wash your hands frequently and stay hydrated  - Follow up with Dr. Lombardi this week.   - Finish your prescribed antibiotics    Feel better!

## 2024-12-16 NOTE — ED PROVIDER NOTE - PHYSICAL EXAMINATION
Gen: well appearing male sitting up in stretcher, no acute distress  Head: normocephalic, atraumatic  EENT: EOMI, moist mucous membranes  Lung: no increased work of breathing, clear to auscultation bilaterally, no wheezing, speaking in full sentences  CV: regular rate, regular rhythm, normal s1/s2  Abd: soft, non-tender, non-distended  MSK: No edema, no visible deformities, full range of motion in all 4 extremities  Neuro: Awake, alert, clear speech  Skin: No obvious rash, no jaundice

## 2024-12-16 NOTE — ED PROVIDER NOTE - PROGRESS NOTE DETAILS
Patient with stable bp while in ED, +rhino/entero virus, no pna on CT chest, patient well appearing, stable and appropriate for dc home. advised continue and finish current abx regimen. f/u Dr. Lombardi.

## 2024-12-16 NOTE — ED ADULT NURSE NOTE - OBJECTIVE STATEMENT
Pt arrives to ED c/o having low BP at outpatient doctor. Pt states he was dx with PNU x1 week ago. Pt states he has been compliant with abx and has been feeling better. Denies SOB, dizziness, light headed, chest pain.

## 2024-12-16 NOTE — ED PROVIDER NOTE - OBJECTIVE STATEMENT
[Diabetes Foot Care] : diabetes foot care [Long Term Vascular Complications] : long term vascular complications of diabetes [Carbohydrate Consistent Diet] : carbohydrate consistent diet [Importance of Diet and Exercise] : importance of diet and exercise to improve glycemic control, achieve weight loss and improve cardiovascular health [Exercise/Effect on Glucose] : exercise/effect on glucose [Hypoglycemia Management] : hypoglycemia management [Glucagon Use] : glucagon use [Ketone Testing] : ketone testing [Action and use of Insulin] : action and use of short and long-acting insulin [Self Monitoring of Blood Glucose] : self monitoring of blood glucose [Insulin Self-Administration] : insulin self-administration [Injection Technique, Storage, Sharps Disposal] : injection technique, storage, and sharps disposal [Sick-Day Management] : sick-day management [Retinopathy Screening] : Patient was referred to ophthalmology for retinopathy screening 89 year old male with PMHx CAD s/p stent x4 and CABGx3, CHF, s/p AICD/PPM, Afib - on digoxin and Xarelto, HTN, orthostatic hypotension - on midodrine, hypothyroidism, sent by PCP for evaluation of pneumonia. Patient states was diagnosed with pneumonia by urgent care about 1 week ago, started doxycycline 2 days ago and followed up with Dr. Lombardi (PCP) today. I discussed with Dr. Lombardi who states that patient initially had pulse ox of 89% on RA, which improved to 97% without intervention. BP in office was 80s/50s. Dr. Lombardi was concerned for failure of outpatient therapy/worsening pneumonia/dehydration so sent patient to ED. Patient reports has had 4 weeks of productive cough. Denies shortness of breath, chest pain, fever, chills, abdominal pain, other concerns. [FreeTextEntry1] : Patient is a 29-year-old man with history of type 1 diabetes, history establish endocrinology visit\par \par 1.  Type 1 diabetes, relatively well controlled\par Continue to use his insulin pump with his current setting.\par Of note patient has been using a mix of insulin Fiasp and Novlog in his insulin pump. 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  He is well elsewhere that the mix of the insulin is not FDA approved.  Thus far has been working for him. \par We will check type I antibodies.  We will check C-peptide level.\par He is up-to-date with his ophthalmologist and has no active issue with his feet.\par \par 2.  Thyroid screening\par Check TSH and free T4 level every year\par \par 3.  Celiac screening\par We will send celiac antibodies.\par \par \par

## 2024-12-16 NOTE — ED PROVIDER NOTE - ATTENDING CONTRIBUTION TO CARE
89 year old male PMHx CAD c/o cough and weakness. PE WNL. labs and diagnostic imaging results reviewed with patient

## 2024-12-16 NOTE — ED PROVIDER NOTE - CLINICAL SUMMARY MEDICAL DECISION MAKING FREE TEXT BOX
89M with hx CAD, CHF, AICD/PPM, afib, orthostatic hypotension sent by PCP for evaluation of his pneumonia, concern for worsening infection. Patient's initial BP here soft, O2 97% on RA, patient speaking in full sentences, well appearing. Plan for labs, CT chest, reassess.  Disposition pending results/clinical course.

## 2024-12-17 ENCOUNTER — APPOINTMENT (OUTPATIENT)
Dept: CARDIOLOGY | Facility: CLINIC | Age: 89
End: 2024-12-17
Payer: MEDICARE

## 2024-12-17 VITALS
WEIGHT: 218 LBS | RESPIRATION RATE: 16 BRPM | HEIGHT: 76 IN | HEART RATE: 63 BPM | DIASTOLIC BLOOD PRESSURE: 50 MMHG | BODY MASS INDEX: 26.55 KG/M2 | SYSTOLIC BLOOD PRESSURE: 92 MMHG | OXYGEN SATURATION: 93 %

## 2024-12-17 DIAGNOSIS — I35.0 NONRHEUMATIC AORTIC (VALVE) STENOSIS: ICD-10-CM

## 2024-12-17 DIAGNOSIS — I25.10 ATHEROSCLEROTIC HEART DISEASE OF NATIVE CORONARY ARTERY W/OUT ANGINA PECTORIS: ICD-10-CM

## 2024-12-17 DIAGNOSIS — E78.5 HYPERLIPIDEMIA, UNSPECIFIED: ICD-10-CM

## 2024-12-17 DIAGNOSIS — I42.9 CARDIOMYOPATHY, UNSPECIFIED: ICD-10-CM

## 2024-12-17 DIAGNOSIS — I95.89 OTHER HYPOTENSION: ICD-10-CM

## 2024-12-17 DIAGNOSIS — I48.20 CHRONIC ATRIAL FIBRILLATION, UNSP: ICD-10-CM

## 2024-12-17 LAB — INR PPP: 3.1 RATIO

## 2024-12-17 PROCEDURE — 93000 ELECTROCARDIOGRAM COMPLETE: CPT

## 2024-12-17 PROCEDURE — 99215 OFFICE O/P EST HI 40 MIN: CPT

## 2024-12-17 PROCEDURE — G2211 COMPLEX E/M VISIT ADD ON: CPT

## 2024-12-17 PROCEDURE — 93289 INTERROG DEVICE EVAL HEART: CPT

## 2024-12-17 PROCEDURE — 93000 ELECTROCARDIOGRAM COMPLETE: CPT | Mod: 59

## 2024-12-20 ENCOUNTER — APPOINTMENT (OUTPATIENT)
Dept: CARDIOLOGY | Facility: CLINIC | Age: 89
End: 2024-12-20

## 2024-12-31 NOTE — DISCHARGE NOTE ADULT - WARFARIN/COUMADIN INCREASES YOUR RISK FOR BLEEDING. NOTIFY YOUR DOCTOR IF YOU SEE ANY BLEEDING OR ANY OF THE SIDE EFFECTS LISTED IN THE WARFARIN/COUMADIN BOOKLET. DIET AND MEDICATIONS CAN AFFECT THE PT/INR
This RN has reviewed discharge instructions with the patient at this time. Patient has been made aware of prescription(s) called into pharmacy on file for , follow up care, and diagnoses care instructions. The Patient verbalized understanding and denies any further questions. VSS and pt AOx4. Patient ambulatory out to waiting room at this time.      Statement Selected

## 2025-01-28 ENCOUNTER — APPOINTMENT (OUTPATIENT)
Dept: CARDIOLOGY | Facility: CLINIC | Age: 89
End: 2025-01-28

## 2025-01-31 ENCOUNTER — APPOINTMENT (OUTPATIENT)
Dept: CARDIOLOGY | Facility: CLINIC | Age: 89
End: 2025-01-31

## 2025-01-31 PROCEDURE — 93306 TTE W/DOPPLER COMPLETE: CPT

## 2025-01-31 RX ADMIN — PERFLUTREN MG/ML: 6.52 INJECTION, SUSPENSION INTRAVENOUS at 00:00

## 2025-02-03 RX ORDER — PERFLUTREN 6.52 MG/ML
6.52 INJECTION, SUSPENSION INTRAVENOUS
Qty: 2 | Refills: 0 | Status: COMPLETED | OUTPATIENT
Start: 2025-01-31

## 2025-02-06 ENCOUNTER — MED ADMIN CHARGE (OUTPATIENT)
Age: 89
End: 2025-02-06

## 2025-02-06 ENCOUNTER — APPOINTMENT (OUTPATIENT)
Dept: CARDIOLOGY | Facility: CLINIC | Age: 89
End: 2025-02-06
Payer: MEDICARE

## 2025-02-06 VITALS
HEART RATE: 75 BPM | RESPIRATION RATE: 16 BRPM | SYSTOLIC BLOOD PRESSURE: 120 MMHG | DIASTOLIC BLOOD PRESSURE: 70 MMHG | OXYGEN SATURATION: 94 %

## 2025-02-06 LAB — INR PPP: 2.5 RATIO

## 2025-02-06 PROCEDURE — 85610 PROTHROMBIN TIME: CPT | Mod: QW

## 2025-02-06 PROCEDURE — 93793 ANTICOAG MGMT PT WARFARIN: CPT

## 2025-02-06 RX ORDER — PERFLUTREN 6.52 MG/ML
6.52 INJECTION, SUSPENSION INTRAVENOUS
Qty: 2 | Refills: 0 | Status: COMPLETED | OUTPATIENT
Start: 2025-02-06

## 2025-02-10 ENCOUNTER — APPOINTMENT (OUTPATIENT)
Dept: PULMONOLOGY | Facility: CLINIC | Age: 89
End: 2025-02-10
Payer: MEDICARE

## 2025-02-10 VITALS
DIASTOLIC BLOOD PRESSURE: 70 MMHG | HEIGHT: 71 IN | WEIGHT: 218 LBS | BODY MASS INDEX: 30.52 KG/M2 | RESPIRATION RATE: 16 BRPM | SYSTOLIC BLOOD PRESSURE: 122 MMHG

## 2025-02-10 VITALS — HEART RATE: 65 BPM | OXYGEN SATURATION: 96 %

## 2025-02-10 DIAGNOSIS — R93.89 ABNORMAL FINDINGS ON DIAGNOSTIC IMAGING OF OTHER SPECIFIED BODY STRUCTURES: ICD-10-CM

## 2025-02-10 DIAGNOSIS — J43.9 EMPHYSEMA, UNSPECIFIED: ICD-10-CM

## 2025-02-10 PROCEDURE — 99205 OFFICE O/P NEW HI 60 MIN: CPT

## 2025-02-10 PROCEDURE — G2211 COMPLEX E/M VISIT ADD ON: CPT

## 2025-02-10 RX ORDER — NEBULIZER ACCESSORIES
KIT MISCELLANEOUS
Qty: 1 | Refills: 6 | Status: ACTIVE | COMMUNITY
Start: 2025-02-10 | End: 1900-01-01

## 2025-02-10 RX ORDER — AZITHROMYCIN 250 MG/1
250 TABLET, FILM COATED ORAL
Qty: 1 | Refills: 0 | Status: ACTIVE | COMMUNITY
Start: 2025-02-10 | End: 1900-01-01

## 2025-02-10 RX ORDER — FLUTICASONE FUROATE, UMECLIDINIUM BROMIDE AND VILANTEROL TRIFENATATE 100; 62.5; 25 UG/1; UG/1; UG/1
100-62.5-25 POWDER RESPIRATORY (INHALATION)
Qty: 1 | Refills: 4 | Status: ACTIVE | COMMUNITY
Start: 2025-02-10 | End: 1900-01-01

## 2025-02-10 RX ORDER — LEVALBUTEROL HYDROCHLORIDE 1.25 MG/3ML
1.25 SOLUTION RESPIRATORY (INHALATION) EVERY 4 HOURS
Qty: 1620 | Refills: 0 | Status: ACTIVE | COMMUNITY
Start: 2025-02-10 | End: 1900-01-01

## 2025-02-13 ENCOUNTER — NON-APPOINTMENT (OUTPATIENT)
Age: 89
End: 2025-02-13

## 2025-02-27 ENCOUNTER — APPOINTMENT (OUTPATIENT)
Dept: CARDIOLOGY | Facility: CLINIC | Age: 89
End: 2025-02-27

## 2025-02-27 ENCOUNTER — APPOINTMENT (OUTPATIENT)
Dept: CARDIOLOGY | Facility: CLINIC | Age: 89
End: 2025-02-27
Payer: MEDICARE

## 2025-02-27 VITALS — DIASTOLIC BLOOD PRESSURE: 70 MMHG | HEART RATE: 67 BPM | SYSTOLIC BLOOD PRESSURE: 120 MMHG

## 2025-02-27 VITALS
RESPIRATION RATE: 16 BRPM | HEIGHT: 71 IN | WEIGHT: 215 LBS | HEART RATE: 62 BPM | OXYGEN SATURATION: 96 % | DIASTOLIC BLOOD PRESSURE: 70 MMHG | SYSTOLIC BLOOD PRESSURE: 120 MMHG | BODY MASS INDEX: 30.1 KG/M2

## 2025-02-27 DIAGNOSIS — I95.89 OTHER HYPOTENSION: ICD-10-CM

## 2025-02-27 DIAGNOSIS — I42.9 CARDIOMYOPATHY, UNSPECIFIED: ICD-10-CM

## 2025-02-27 DIAGNOSIS — I35.0 NONRHEUMATIC AORTIC (VALVE) STENOSIS: ICD-10-CM

## 2025-02-27 DIAGNOSIS — I48.20 CHRONIC ATRIAL FIBRILLATION, UNSP: ICD-10-CM

## 2025-02-27 DIAGNOSIS — Z95.810 PRESENCE OF AUTOMATIC (IMPLANTABLE) CARDIAC DEFIBRILLATOR: ICD-10-CM

## 2025-02-27 DIAGNOSIS — J43.9 EMPHYSEMA, UNSPECIFIED: ICD-10-CM

## 2025-02-27 DIAGNOSIS — I25.10 ATHEROSCLEROTIC HEART DISEASE OF NATIVE CORONARY ARTERY W/OUT ANGINA PECTORIS: ICD-10-CM

## 2025-02-27 DIAGNOSIS — E78.5 HYPERLIPIDEMIA, UNSPECIFIED: ICD-10-CM

## 2025-02-27 PROCEDURE — 99214 OFFICE O/P EST MOD 30 MIN: CPT

## 2025-02-27 PROCEDURE — 93000 ELECTROCARDIOGRAM COMPLETE: CPT

## 2025-02-27 PROCEDURE — 93289 INTERROG DEVICE EVAL HEART: CPT

## 2025-02-27 PROCEDURE — G2211 COMPLEX E/M VISIT ADD ON: CPT

## 2025-03-02 LAB — INR PPP: 2.3 RATIO

## 2025-03-27 ENCOUNTER — APPOINTMENT (OUTPATIENT)
Dept: PULMONOLOGY | Facility: CLINIC | Age: 89
End: 2025-03-27
Payer: MEDICARE

## 2025-03-27 VITALS — BODY MASS INDEX: 30.3 KG/M2 | HEIGHT: 70.3 IN | WEIGHT: 214 LBS

## 2025-03-27 VITALS
DIASTOLIC BLOOD PRESSURE: 62 MMHG | SYSTOLIC BLOOD PRESSURE: 124 MMHG | HEART RATE: 70 BPM | RESPIRATION RATE: 16 BRPM | OXYGEN SATURATION: 96 %

## 2025-03-27 DIAGNOSIS — J43.9 EMPHYSEMA, UNSPECIFIED: ICD-10-CM

## 2025-03-27 PROCEDURE — 85018 HEMOGLOBIN: CPT | Mod: QW

## 2025-03-27 PROCEDURE — 99213 OFFICE O/P EST LOW 20 MIN: CPT | Mod: 25

## 2025-03-27 PROCEDURE — 94010 BREATHING CAPACITY TEST: CPT

## 2025-03-27 PROCEDURE — 94727 GAS DIL/WSHOT DETER LNG VOL: CPT

## 2025-03-27 PROCEDURE — 94729 DIFFUSING CAPACITY: CPT

## 2025-03-28 ENCOUNTER — APPOINTMENT (OUTPATIENT)
Dept: CARDIOLOGY | Facility: CLINIC | Age: 89
End: 2025-03-28
Payer: MEDICARE

## 2025-03-28 VITALS — HEART RATE: 68 BPM | SYSTOLIC BLOOD PRESSURE: 120 MMHG | DIASTOLIC BLOOD PRESSURE: 75 MMHG | OXYGEN SATURATION: 92 %

## 2025-03-28 LAB — INR PPP: 2.3 RATIO

## 2025-03-28 PROCEDURE — 93793 ANTICOAG MGMT PT WARFARIN: CPT

## 2025-03-28 PROCEDURE — 85610 PROTHROMBIN TIME: CPT | Mod: QW

## 2025-04-25 ENCOUNTER — APPOINTMENT (OUTPATIENT)
Dept: CARDIOLOGY | Facility: CLINIC | Age: 89
End: 2025-04-25
Payer: MEDICARE

## 2025-04-25 VITALS
SYSTOLIC BLOOD PRESSURE: 110 MMHG | HEART RATE: 76 BPM | RESPIRATION RATE: 16 BRPM | OXYGEN SATURATION: 97 % | DIASTOLIC BLOOD PRESSURE: 68 MMHG

## 2025-04-25 LAB — INR PPP: 2.1 RATIO

## 2025-04-25 PROCEDURE — 93793 ANTICOAG MGMT PT WARFARIN: CPT

## 2025-04-25 PROCEDURE — 85610 PROTHROMBIN TIME: CPT | Mod: QW

## 2025-05-15 ENCOUNTER — APPOINTMENT (OUTPATIENT)
Dept: CARDIOLOGY | Facility: CLINIC | Age: 89
End: 2025-05-15
Payer: MEDICARE

## 2025-05-15 VITALS
OXYGEN SATURATION: 98 % | HEIGHT: 70 IN | BODY MASS INDEX: 30.64 KG/M2 | RESPIRATION RATE: 18 BRPM | SYSTOLIC BLOOD PRESSURE: 118 MMHG | WEIGHT: 214 LBS | HEART RATE: 64 BPM | DIASTOLIC BLOOD PRESSURE: 74 MMHG

## 2025-05-15 DIAGNOSIS — I35.0 NONRHEUMATIC AORTIC (VALVE) STENOSIS: ICD-10-CM

## 2025-05-15 DIAGNOSIS — I25.10 ATHEROSCLEROTIC HEART DISEASE OF NATIVE CORONARY ARTERY W/OUT ANGINA PECTORIS: ICD-10-CM

## 2025-05-15 DIAGNOSIS — Z95.810 PRESENCE OF AUTOMATIC (IMPLANTABLE) CARDIAC DEFIBRILLATOR: ICD-10-CM

## 2025-05-15 DIAGNOSIS — I48.20 CHRONIC ATRIAL FIBRILLATION, UNSP: ICD-10-CM

## 2025-05-15 DIAGNOSIS — E78.5 HYPERLIPIDEMIA, UNSPECIFIED: ICD-10-CM

## 2025-05-15 DIAGNOSIS — I95.89 OTHER HYPOTENSION: ICD-10-CM

## 2025-05-15 DIAGNOSIS — J43.9 EMPHYSEMA, UNSPECIFIED: ICD-10-CM

## 2025-05-15 PROCEDURE — G2211 COMPLEX E/M VISIT ADD ON: CPT

## 2025-05-15 PROCEDURE — 93000 ELECTROCARDIOGRAM COMPLETE: CPT

## 2025-05-15 PROCEDURE — 99214 OFFICE O/P EST MOD 30 MIN: CPT

## 2025-05-16 LAB — INR PPP: 2.5 RATIO

## 2025-06-05 ENCOUNTER — APPOINTMENT (OUTPATIENT)
Dept: CARDIOLOGY | Facility: CLINIC | Age: 89
End: 2025-06-05
Payer: MEDICARE

## 2025-06-05 VITALS
DIASTOLIC BLOOD PRESSURE: 66 MMHG | SYSTOLIC BLOOD PRESSURE: 112 MMHG | HEART RATE: 96 BPM | RESPIRATION RATE: 16 BRPM | OXYGEN SATURATION: 95 %

## 2025-06-05 LAB — INR PPP: 2.7 RATIO

## 2025-06-05 PROCEDURE — 93793 ANTICOAG MGMT PT WARFARIN: CPT

## 2025-06-05 PROCEDURE — 85610 PROTHROMBIN TIME: CPT | Mod: QW

## 2025-06-09 NOTE — DISCHARGE NOTE ADULT - NS AS DC PROVIDER CONTACT Y/N MULTI
Orders:    Budeson-Glycopyrrol-Formoterol (Breztri Aerosphere) 160-9-4.8 MCG/ACT AERO; Inhale 2 puffs 2 (two) times a day Rinse mouth after use.     Yes

## 2025-07-03 ENCOUNTER — NON-APPOINTMENT (OUTPATIENT)
Age: 89
End: 2025-07-03

## 2025-07-03 ENCOUNTER — APPOINTMENT (OUTPATIENT)
Dept: CARDIOLOGY | Facility: CLINIC | Age: 89
End: 2025-07-03
Payer: MEDICARE

## 2025-07-03 VITALS
DIASTOLIC BLOOD PRESSURE: 48 MMHG | OXYGEN SATURATION: 96 % | HEART RATE: 88 BPM | SYSTOLIC BLOOD PRESSURE: 99 MMHG | HEIGHT: 70 IN

## 2025-07-03 LAB — INR PPP: 3.2 RATIO

## 2025-07-03 PROCEDURE — 85610 PROTHROMBIN TIME: CPT | Mod: QW

## 2025-07-03 PROCEDURE — 93793 ANTICOAG MGMT PT WARFARIN: CPT

## 2025-07-10 ENCOUNTER — APPOINTMENT (OUTPATIENT)
Dept: CARDIOLOGY | Facility: CLINIC | Age: 89
End: 2025-07-10
Payer: MEDICARE

## 2025-07-10 VITALS
SYSTOLIC BLOOD PRESSURE: 96 MMHG | DIASTOLIC BLOOD PRESSURE: 64 MMHG | RESPIRATION RATE: 16 BRPM | OXYGEN SATURATION: 94 % | HEART RATE: 90 BPM

## 2025-07-10 LAB — INR PPP: 3.4 RATIO

## 2025-07-10 PROCEDURE — 85610 PROTHROMBIN TIME: CPT | Mod: QW

## 2025-07-10 PROCEDURE — 93793 ANTICOAG MGMT PT WARFARIN: CPT

## 2025-07-24 ENCOUNTER — APPOINTMENT (OUTPATIENT)
Dept: ORTHOPEDIC SURGERY | Facility: CLINIC | Age: 89
End: 2025-07-24
Payer: MEDICARE

## 2025-07-24 ENCOUNTER — APPOINTMENT (OUTPATIENT)
Dept: CARDIOLOGY | Facility: CLINIC | Age: 89
End: 2025-07-24
Payer: MEDICARE

## 2025-07-24 VITALS
DIASTOLIC BLOOD PRESSURE: 68 MMHG | SYSTOLIC BLOOD PRESSURE: 98 MMHG | BODY MASS INDEX: 29.35 KG/M2 | WEIGHT: 205 LBS | HEIGHT: 70 IN | HEART RATE: 62 BPM

## 2025-07-24 VITALS
DIASTOLIC BLOOD PRESSURE: 63 MMHG | SYSTOLIC BLOOD PRESSURE: 90 MMHG | OXYGEN SATURATION: 100 % | RESPIRATION RATE: 16 BRPM | HEART RATE: 70 BPM

## 2025-07-24 LAB — INR PPP: 3.6 RATIO

## 2025-07-24 PROCEDURE — 99204 OFFICE O/P NEW MOD 45 MIN: CPT

## 2025-07-24 PROCEDURE — 72110 X-RAY EXAM L-2 SPINE 4/>VWS: CPT

## 2025-07-24 PROCEDURE — 85610 PROTHROMBIN TIME: CPT | Mod: QW

## 2025-07-24 PROCEDURE — 93793 ANTICOAG MGMT PT WARFARIN: CPT

## 2025-07-24 RX ORDER — WARFARIN 2 MG/1
2 TABLET ORAL
Qty: 90 | Refills: 1 | Status: ACTIVE | COMMUNITY
Start: 1900-01-01 | End: 1900-01-01

## 2025-07-24 RX ORDER — WARFARIN 2 MG/1
2 TABLET ORAL
Refills: 0 | Status: ACTIVE | COMMUNITY

## 2025-07-31 ENCOUNTER — OUTPATIENT (OUTPATIENT)
Dept: OUTPATIENT SERVICES | Facility: HOSPITAL | Age: 89
LOS: 1 days | End: 2025-07-31
Payer: MEDICARE

## 2025-07-31 ENCOUNTER — APPOINTMENT (OUTPATIENT)
Dept: CT IMAGING | Facility: CLINIC | Age: 89
End: 2025-07-31
Payer: MEDICARE

## 2025-07-31 DIAGNOSIS — Z98.890 OTHER SPECIFIED POSTPROCEDURAL STATES: Chronic | ICD-10-CM

## 2025-07-31 DIAGNOSIS — N28.0 ISCHEMIA AND INFARCTION OF KIDNEY: Chronic | ICD-10-CM

## 2025-07-31 DIAGNOSIS — Z95.1 PRESENCE OF AORTOCORONARY BYPASS GRAFT: Chronic | ICD-10-CM

## 2025-07-31 DIAGNOSIS — M16.12 UNILATERAL PRIMARY OSTEOARTHRITIS, LEFT HIP: ICD-10-CM

## 2025-07-31 DIAGNOSIS — M47.816 SPONDYLOSIS WITHOUT MYELOPATHY OR RADICULOPATHY, LUMBAR REGION: ICD-10-CM

## 2025-07-31 DIAGNOSIS — Z98.89 OTHER SPECIFIED POSTPROCEDURAL STATES: Chronic | ICD-10-CM

## 2025-07-31 DIAGNOSIS — Z98.49 CATARACT EXTRACTION STATUS, UNSPECIFIED EYE: Chronic | ICD-10-CM

## 2025-07-31 DIAGNOSIS — Z95.810 PRESENCE OF AUTOMATIC (IMPLANTABLE) CARDIAC DEFIBRILLATOR: Chronic | ICD-10-CM

## 2025-07-31 DIAGNOSIS — Z95.5 PRESENCE OF CORONARY ANGIOPLASTY IMPLANT AND GRAFT: Chronic | ICD-10-CM

## 2025-07-31 PROCEDURE — 73700 CT LOWER EXTREMITY W/O DYE: CPT | Mod: 26,LT

## 2025-07-31 PROCEDURE — 73700 CT LOWER EXTREMITY W/O DYE: CPT

## 2025-07-31 PROCEDURE — 72131 CT LUMBAR SPINE W/O DYE: CPT | Mod: 26

## 2025-07-31 PROCEDURE — 72131 CT LUMBAR SPINE W/O DYE: CPT

## 2025-08-05 ENCOUNTER — APPOINTMENT (OUTPATIENT)
Dept: ORTHOPEDIC SURGERY | Facility: CLINIC | Age: 89
End: 2025-08-05
Payer: MEDICARE

## 2025-08-05 DIAGNOSIS — M46.1 SACROILIITIS, NOT ELSEWHERE CLASSIFIED: ICD-10-CM

## 2025-08-05 DIAGNOSIS — M16.12 UNILATERAL PRIMARY OSTEOARTHRITIS, LEFT HIP: ICD-10-CM

## 2025-08-05 DIAGNOSIS — M47.816 SPONDYLOSIS W/OUT MYELOPATHY OR RADICULOPATHY, LUMBAR REGION: ICD-10-CM

## 2025-08-05 PROCEDURE — 99213 OFFICE O/P EST LOW 20 MIN: CPT | Mod: 93

## 2025-08-06 ENCOUNTER — APPOINTMENT (OUTPATIENT)
Dept: CARDIOLOGY | Facility: CLINIC | Age: 89
End: 2025-08-06

## 2025-08-07 ENCOUNTER — APPOINTMENT (OUTPATIENT)
Dept: CARDIOLOGY | Facility: CLINIC | Age: 89
End: 2025-08-07
Payer: MEDICARE

## 2025-08-07 VITALS — HEART RATE: 82 BPM | DIASTOLIC BLOOD PRESSURE: 60 MMHG | SYSTOLIC BLOOD PRESSURE: 95 MMHG | RESPIRATION RATE: 16 BRPM

## 2025-08-07 PROCEDURE — 85610 PROTHROMBIN TIME: CPT | Mod: QW

## 2025-08-07 PROCEDURE — 93793 ANTICOAG MGMT PT WARFARIN: CPT

## 2025-08-10 LAB — INR PPP: 3.5 RATIO

## 2025-08-12 ENCOUNTER — NON-APPOINTMENT (OUTPATIENT)
Age: 89
End: 2025-08-12

## 2025-08-14 ENCOUNTER — APPOINTMENT (OUTPATIENT)
Dept: CARDIOLOGY | Facility: CLINIC | Age: 89
End: 2025-08-14
Payer: MEDICARE

## 2025-08-14 VITALS
HEART RATE: 87 BPM | SYSTOLIC BLOOD PRESSURE: 118 MMHG | OXYGEN SATURATION: 97 % | DIASTOLIC BLOOD PRESSURE: 81 MMHG | RESPIRATION RATE: 16 BRPM

## 2025-08-14 PROCEDURE — 93793 ANTICOAG MGMT PT WARFARIN: CPT

## 2025-08-14 PROCEDURE — 85610 PROTHROMBIN TIME: CPT | Mod: QW

## 2025-08-14 RX ORDER — WARFARIN 1 MG/1
1 TABLET ORAL
Qty: 90 | Refills: 3 | Status: ACTIVE | COMMUNITY
Start: 2025-08-14 | End: 1900-01-01

## 2025-08-18 LAB — INR PPP: 2.5 RATIO

## 2025-08-26 ENCOUNTER — APPOINTMENT (OUTPATIENT)
Dept: CARDIOLOGY | Facility: CLINIC | Age: 89
End: 2025-08-26

## 2025-08-26 VITALS
SYSTOLIC BLOOD PRESSURE: 136 MMHG | HEIGHT: 70 IN | HEART RATE: 65 BPM | WEIGHT: 207 LBS | OXYGEN SATURATION: 98 % | DIASTOLIC BLOOD PRESSURE: 80 MMHG | RESPIRATION RATE: 16 BRPM | BODY MASS INDEX: 29.63 KG/M2

## 2025-08-26 DIAGNOSIS — Z95.810 PRESENCE OF AUTOMATIC (IMPLANTABLE) CARDIAC DEFIBRILLATOR: ICD-10-CM

## 2025-08-26 DIAGNOSIS — I48.20 CHRONIC ATRIAL FIBRILLATION, UNSP: ICD-10-CM

## 2025-08-26 DIAGNOSIS — I35.0 NONRHEUMATIC AORTIC (VALVE) STENOSIS: ICD-10-CM

## 2025-08-26 DIAGNOSIS — E78.5 HYPERLIPIDEMIA, UNSPECIFIED: ICD-10-CM

## 2025-08-26 DIAGNOSIS — J43.9 EMPHYSEMA, UNSPECIFIED: ICD-10-CM

## 2025-08-26 DIAGNOSIS — I25.10 ATHEROSCLEROTIC HEART DISEASE OF NATIVE CORONARY ARTERY W/OUT ANGINA PECTORIS: ICD-10-CM

## 2025-08-26 DIAGNOSIS — I42.9 CARDIOMYOPATHY, UNSPECIFIED: ICD-10-CM

## 2025-08-26 DIAGNOSIS — I95.89 OTHER HYPOTENSION: ICD-10-CM

## 2025-08-26 PROCEDURE — G2211 COMPLEX E/M VISIT ADD ON: CPT

## 2025-08-26 PROCEDURE — 93289 INTERROG DEVICE EVAL HEART: CPT

## 2025-08-26 PROCEDURE — 93000 ELECTROCARDIOGRAM COMPLETE: CPT | Mod: 59

## 2025-08-26 PROCEDURE — 99214 OFFICE O/P EST MOD 30 MIN: CPT

## 2025-08-28 ENCOUNTER — APPOINTMENT (OUTPATIENT)
Dept: CARDIOLOGY | Facility: CLINIC | Age: 89
End: 2025-08-28

## 2025-09-09 ENCOUNTER — APPOINTMENT (OUTPATIENT)
Dept: PULMONOLOGY | Facility: CLINIC | Age: 89
End: 2025-09-09
Payer: MEDICARE

## 2025-09-09 VITALS
HEIGHT: 70 IN | DIASTOLIC BLOOD PRESSURE: 64 MMHG | RESPIRATION RATE: 16 BRPM | SYSTOLIC BLOOD PRESSURE: 122 MMHG | HEART RATE: 73 BPM | WEIGHT: 205 LBS | BODY MASS INDEX: 29.35 KG/M2 | OXYGEN SATURATION: 98 %

## 2025-09-09 DIAGNOSIS — J43.9 EMPHYSEMA, UNSPECIFIED: ICD-10-CM

## 2025-09-09 DIAGNOSIS — R93.89 ABNORMAL FINDINGS ON DIAGNOSTIC IMAGING OF OTHER SPECIFIED BODY STRUCTURES: ICD-10-CM

## 2025-09-09 PROCEDURE — G2211 COMPLEX E/M VISIT ADD ON: CPT

## 2025-09-09 PROCEDURE — 99213 OFFICE O/P EST LOW 20 MIN: CPT

## 2025-09-12 ENCOUNTER — APPOINTMENT (OUTPATIENT)
Dept: CARDIOLOGY | Facility: CLINIC | Age: 89
End: 2025-09-12
Payer: MEDICARE

## 2025-09-12 VITALS
DIASTOLIC BLOOD PRESSURE: 86 MMHG | SYSTOLIC BLOOD PRESSURE: 126 MMHG | OXYGEN SATURATION: 99 % | HEART RATE: 95 BPM | RESPIRATION RATE: 16 BRPM

## 2025-09-12 LAB — INR PPP: 1.5 RATIO

## 2025-09-12 PROCEDURE — 85610 PROTHROMBIN TIME: CPT | Mod: QW

## 2025-09-12 PROCEDURE — 93793 ANTICOAG MGMT PT WARFARIN: CPT

## 2025-09-18 ENCOUNTER — APPOINTMENT (OUTPATIENT)
Dept: CARDIOLOGY | Facility: CLINIC | Age: 89
End: 2025-09-18
Payer: MEDICARE

## 2025-09-18 VITALS
RESPIRATION RATE: 16 BRPM | OXYGEN SATURATION: 97 % | SYSTOLIC BLOOD PRESSURE: 85 MMHG | DIASTOLIC BLOOD PRESSURE: 57 MMHG | HEART RATE: 64 BPM

## 2025-09-18 PROCEDURE — 93793 ANTICOAG MGMT PT WARFARIN: CPT

## 2025-09-18 PROCEDURE — 85610 PROTHROMBIN TIME: CPT | Mod: QW

## 2025-09-22 LAB — INR PPP: 2.9 RATIO
